# Patient Record
Sex: FEMALE | Race: WHITE | Employment: OTHER | URBAN - METROPOLITAN AREA
[De-identification: names, ages, dates, MRNs, and addresses within clinical notes are randomized per-mention and may not be internally consistent; named-entity substitution may affect disease eponyms.]

---

## 2017-01-01 ENCOUNTER — HOSPITAL ENCOUNTER (EMERGENCY)
Age: 82
Discharge: HOME | End: 2017-01-02
Attending: EMERGENCY MEDICINE
Payer: MEDICARE

## 2017-01-01 VITALS
DIASTOLIC BLOOD PRESSURE: 69 MMHG | OXYGEN SATURATION: 97 % | SYSTOLIC BLOOD PRESSURE: 172 MMHG | WEIGHT: 111 LBS | RESPIRATION RATE: 16 BRPM | BODY MASS INDEX: 22.42 KG/M2 | TEMPERATURE: 98.4 F | HEART RATE: 69 BPM

## 2017-01-01 DIAGNOSIS — R19.7 DIARRHEA, UNSPECIFIED TYPE: Primary | ICD-10-CM

## 2017-01-01 DIAGNOSIS — N30.00 ACUTE CYSTITIS WITHOUT HEMATURIA: ICD-10-CM

## 2017-01-01 LAB
ALBUMIN SERPL-MCNC: 4.3 G/DL (ref 3.9–4.9)
ALP BLD-CCNC: 58 U/L (ref 40–130)
ALT SERPL-CCNC: 9 U/L (ref 0–33)
ANION GAP SERPL CALCULATED.3IONS-SCNC: 16 MEQ/L (ref 7–13)
AST SERPL-CCNC: 16 U/L (ref 0–35)
BACTERIA: ABNORMAL /HPF
BASOPHILS ABSOLUTE: 0 K/UL (ref 0–0.2)
BASOPHILS RELATIVE PERCENT: 0.2 %
BILIRUB SERPL-MCNC: 0.3 MG/DL (ref 0–1.2)
BILIRUBIN URINE: NEGATIVE
BLOOD, URINE: ABNORMAL
BUN BLDV-MCNC: 17 MG/DL (ref 8–23)
CALCIUM SERPL-MCNC: 9.2 MG/DL (ref 8.6–10.2)
CHLORIDE BLD-SCNC: 105 MEQ/L (ref 98–107)
CLARITY: ABNORMAL
CO2: 22 MEQ/L (ref 22–29)
COLOR: YELLOW
CREAT SERPL-MCNC: 0.81 MG/DL (ref 0.5–0.9)
EOSINOPHILS ABSOLUTE: 0.1 K/UL (ref 0–0.7)
EOSINOPHILS RELATIVE PERCENT: 0.6 %
EPITHELIAL CELLS, UA: ABNORMAL /HPF
GFR AFRICAN AMERICAN: >60
GFR NON-AFRICAN AMERICAN: >60
GLOBULIN: 3.2 G/DL (ref 2.3–3.5)
GLUCOSE BLD-MCNC: 88 MG/DL (ref 74–109)
GLUCOSE URINE: 100 MG/DL
HCT VFR BLD CALC: 37.5 % (ref 37–47)
HEMOGLOBIN: 12.2 G/DL (ref 12–16)
KETONES, URINE: NEGATIVE MG/DL
LEUKOCYTE ESTERASE, URINE: ABNORMAL
LYMPHOCYTES ABSOLUTE: 2.5 K/UL (ref 1–4.8)
LYMPHOCYTES RELATIVE PERCENT: 21.9 %
MCH RBC QN AUTO: 29.3 PG (ref 27–31.3)
MCHC RBC AUTO-ENTMCNC: 32.6 % (ref 33–37)
MCV RBC AUTO: 90 FL (ref 82–100)
MONOCYTES ABSOLUTE: 1.1 K/UL (ref 0.2–0.8)
MONOCYTES RELATIVE PERCENT: 9.4 %
NEUTROPHILS ABSOLUTE: 7.8 K/UL (ref 1.4–6.5)
NEUTROPHILS RELATIVE PERCENT: 67.9 %
NITRITE, URINE: NEGATIVE
PDW BLD-RTO: 13.8 % (ref 11.5–14.5)
PH UA: 5.5 (ref 5–9)
PLATELET # BLD: 127 K/UL (ref 130–400)
POTASSIUM SERPL-SCNC: 4 MEQ/L (ref 3.5–5.1)
PROTEIN UA: 100 MG/DL
RBC # BLD: 4.16 M/UL (ref 4.2–5.4)
RBC UA: ABNORMAL /HPF (ref 0–2)
SODIUM BLD-SCNC: 143 MEQ/L (ref 132–144)
SPECIFIC GRAVITY UA: 1.02 (ref 1–1.03)
TOTAL PROTEIN: 7.5 G/DL (ref 6.4–8.1)
UROBILINOGEN, URINE: 0.2 E.U./DL
WBC # BLD: 11.5 K/UL (ref 4.8–10.8)
WBC UA: ABNORMAL /HPF (ref 0–5)

## 2017-01-01 PROCEDURE — 2580000003 HC RX 258: Performed by: EMERGENCY MEDICINE

## 2017-01-01 PROCEDURE — 87186 SC STD MICRODIL/AGAR DIL: CPT

## 2017-01-01 PROCEDURE — 80053 COMPREHEN METABOLIC PANEL: CPT

## 2017-01-01 PROCEDURE — 87493 C DIFF AMPLIFIED PROBE: CPT

## 2017-01-01 PROCEDURE — 87086 URINE CULTURE/COLONY COUNT: CPT

## 2017-01-01 PROCEDURE — 99284 EMERGENCY DEPT VISIT MOD MDM: CPT

## 2017-01-01 PROCEDURE — 36415 COLL VENOUS BLD VENIPUNCTURE: CPT

## 2017-01-01 PROCEDURE — 96361 HYDRATE IV INFUSION ADD-ON: CPT

## 2017-01-01 PROCEDURE — 87077 CULTURE AEROBIC IDENTIFY: CPT

## 2017-01-01 PROCEDURE — 87899 AGENT NOS ASSAY W/OPTIC: CPT

## 2017-01-01 PROCEDURE — 87427 SHIGA-LIKE TOXIN AG IA: CPT

## 2017-01-01 PROCEDURE — 81001 URINALYSIS AUTO W/SCOPE: CPT

## 2017-01-01 PROCEDURE — 85025 COMPLETE CBC W/AUTO DIFF WBC: CPT

## 2017-01-01 PROCEDURE — 96360 HYDRATION IV INFUSION INIT: CPT

## 2017-01-01 PROCEDURE — 87045 FECES CULTURE AEROBIC BACT: CPT

## 2017-01-01 PROCEDURE — 87015 SPECIMEN INFECT AGNT CONCNTJ: CPT

## 2017-01-01 RX ORDER — 0.9 % SODIUM CHLORIDE 0.9 %
500 INTRAVENOUS SOLUTION INTRAVENOUS ONCE
Status: COMPLETED | OUTPATIENT
Start: 2017-01-01 | End: 2017-01-02

## 2017-01-01 RX ORDER — SODIUM CHLORIDE 9 MG/ML
INJECTION, SOLUTION INTRAVENOUS CONTINUOUS
Status: DISCONTINUED | OUTPATIENT
Start: 2017-01-01 | End: 2017-01-02 | Stop reason: HOSPADM

## 2017-01-01 RX ADMIN — SODIUM CHLORIDE 500 ML: 9 INJECTION, SOLUTION INTRAVENOUS at 22:05

## 2017-01-01 ASSESSMENT — PAIN DESCRIPTION - DESCRIPTORS: DESCRIPTORS: CRAMPING

## 2017-01-01 ASSESSMENT — PAIN SCALES - GENERAL: PAINLEVEL_OUTOF10: 3

## 2017-01-01 ASSESSMENT — PAIN DESCRIPTION - LOCATION: LOCATION: ABDOMEN

## 2017-01-02 RX ORDER — CIPROFLOXACIN 500 MG/1
500 TABLET, FILM COATED ORAL 2 TIMES DAILY
Qty: 14 TABLET | Refills: 0 | Status: SHIPPED | OUTPATIENT
Start: 2017-01-02 | End: 2017-01-09

## 2017-01-02 RX ORDER — METRONIDAZOLE 500 MG/1
500 TABLET ORAL 3 TIMES DAILY
Qty: 20 TABLET | Refills: 0 | Status: SHIPPED | OUTPATIENT
Start: 2017-01-02 | End: 2017-01-12

## 2017-01-02 ASSESSMENT — ENCOUNTER SYMPTOMS
RECTAL PAIN: 0
WHEEZING: 0
ABDOMINAL DISTENTION: 0
VOMITING: 0
BLOOD IN STOOL: 0
SHORTNESS OF BREATH: 0
RHINORRHEA: 0
DIARRHEA: 1
EYE PAIN: 0
TROUBLE SWALLOWING: 0
COUGH: 0
NAUSEA: 1
CHEST TIGHTNESS: 0
BACK PAIN: 0
ABDOMINAL PAIN: 0

## 2017-01-03 LAB — CLOSTRIDIUM DIFFICILE DNA AMPLIFICATION: NORMAL

## 2017-01-04 LAB
ORGANISM: ABNORMAL
URINE CULTURE, ROUTINE: ABNORMAL

## 2017-01-05 LAB
CULTURE, STOOL: NORMAL
E COLI SHIGA TOXIN ASSAY: NORMAL

## 2017-01-18 RX ORDER — BLOOD-GLUCOSE METER
KIT MISCELLANEOUS
Qty: 100 EACH | Refills: 3 | Status: SHIPPED | OUTPATIENT
Start: 2017-01-18 | End: 2017-06-05 | Stop reason: SDUPTHER

## 2017-03-07 ENCOUNTER — HOSPITAL ENCOUNTER (OUTPATIENT)
Age: 82
Setting detail: SPECIMEN
Discharge: HOME | End: 2017-03-07
Payer: MEDICARE

## 2017-03-07 ENCOUNTER — NURSE ONLY (OUTPATIENT)
Dept: FAMILY MEDICINE CLINIC | Age: 82
End: 2017-03-07

## 2017-03-07 DIAGNOSIS — E03.8 OTHER SPECIFIED HYPOTHYROIDISM: ICD-10-CM

## 2017-03-07 DIAGNOSIS — E11.9 TYPE 2 DIABETES MELLITUS WITHOUT COMPLICATION, WITH LONG-TERM CURRENT USE OF INSULIN (HCC): Primary | ICD-10-CM

## 2017-03-07 DIAGNOSIS — Z79.4 TYPE 2 DIABETES MELLITUS WITHOUT COMPLICATION, WITH LONG-TERM CURRENT USE OF INSULIN (HCC): ICD-10-CM

## 2017-03-07 DIAGNOSIS — Z79.4 TYPE 2 DIABETES MELLITUS WITHOUT COMPLICATION, WITH LONG-TERM CURRENT USE OF INSULIN (HCC): Primary | ICD-10-CM

## 2017-03-07 DIAGNOSIS — E11.9 TYPE 2 DIABETES MELLITUS WITHOUT COMPLICATION, WITH LONG-TERM CURRENT USE OF INSULIN (HCC): ICD-10-CM

## 2017-03-07 LAB
ANION GAP SERPL CALCULATED.3IONS-SCNC: 11 MEQ/L (ref 7–13)
BUN BLDV-MCNC: 18 MG/DL (ref 8–23)
CALCIUM SERPL-MCNC: 9.2 MG/DL (ref 8.6–10.2)
CHLORIDE BLD-SCNC: 104 MEQ/L (ref 98–107)
CO2: 26 MEQ/L (ref 22–29)
CREAT SERPL-MCNC: 0.92 MG/DL (ref 0.5–0.9)
GFR AFRICAN AMERICAN: >60
GFR NON-AFRICAN AMERICAN: 56.8
GLUCOSE BLD-MCNC: 110 MG/DL (ref 74–109)
HBA1C MFR BLD: 7 % (ref 4.8–5.9)
POTASSIUM SERPL-SCNC: 4.4 MEQ/L (ref 3.5–5.1)
SODIUM BLD-SCNC: 141 MEQ/L (ref 132–144)
T4 FREE: 1.44 NG/DL (ref 0.93–1.7)
TSH SERPL DL<=0.05 MIU/L-ACNC: 2.01 UIU/ML (ref 0.27–4.2)

## 2017-03-07 PROCEDURE — 84443 ASSAY THYROID STIM HORMONE: CPT

## 2017-03-07 PROCEDURE — 80048 BASIC METABOLIC PNL TOTAL CA: CPT

## 2017-03-07 PROCEDURE — 36415 COLL VENOUS BLD VENIPUNCTURE: CPT | Performed by: FAMILY MEDICINE

## 2017-03-07 PROCEDURE — 84439 ASSAY OF FREE THYROXINE: CPT

## 2017-03-07 PROCEDURE — 83036 HEMOGLOBIN GLYCOSYLATED A1C: CPT

## 2017-03-20 RX ORDER — LANCETS 28 GAUGE
1 EACH MISCELLANEOUS 3 TIMES DAILY
Qty: 100 EACH | Refills: 6 | Status: SHIPPED | OUTPATIENT
Start: 2017-03-20 | End: 2017-11-14 | Stop reason: SDUPTHER

## 2017-03-21 ENCOUNTER — OFFICE VISIT (OUTPATIENT)
Dept: SURGERY | Age: 82
End: 2017-03-21

## 2017-03-21 VITALS
HEART RATE: 67 BPM | WEIGHT: 110 LBS | SYSTOLIC BLOOD PRESSURE: 183 MMHG | HEIGHT: 55 IN | BODY MASS INDEX: 25.46 KG/M2 | DIASTOLIC BLOOD PRESSURE: 68 MMHG

## 2017-03-21 DIAGNOSIS — E03.9 HYPOTHYROIDISM, UNSPECIFIED TYPE: ICD-10-CM

## 2017-03-21 DIAGNOSIS — Z79.4 TYPE 2 DIABETES MELLITUS WITHOUT COMPLICATION, WITH LONG-TERM CURRENT USE OF INSULIN (HCC): Primary | ICD-10-CM

## 2017-03-21 DIAGNOSIS — E11.9 TYPE 2 DIABETES MELLITUS WITHOUT COMPLICATION, WITH LONG-TERM CURRENT USE OF INSULIN (HCC): Primary | ICD-10-CM

## 2017-03-21 LAB — GLUCOSE BLD-MCNC: 136 MG/DL

## 2017-03-21 PROCEDURE — G8420 CALC BMI NORM PARAMETERS: HCPCS | Performed by: INTERNAL MEDICINE

## 2017-03-21 PROCEDURE — G8427 DOCREV CUR MEDS BY ELIG CLIN: HCPCS | Performed by: INTERNAL MEDICINE

## 2017-03-21 PROCEDURE — 1090F PRES/ABSN URINE INCON ASSESS: CPT | Performed by: INTERNAL MEDICINE

## 2017-03-21 PROCEDURE — 1123F ACP DISCUSS/DSCN MKR DOCD: CPT | Performed by: INTERNAL MEDICINE

## 2017-03-21 PROCEDURE — G8484 FLU IMMUNIZE NO ADMIN: HCPCS | Performed by: INTERNAL MEDICINE

## 2017-03-21 PROCEDURE — 82962 GLUCOSE BLOOD TEST: CPT | Performed by: INTERNAL MEDICINE

## 2017-03-21 PROCEDURE — G8598 ASA/ANTIPLAT THER USED: HCPCS | Performed by: INTERNAL MEDICINE

## 2017-03-21 PROCEDURE — 4040F PNEUMOC VAC/ADMIN/RCVD: CPT | Performed by: INTERNAL MEDICINE

## 2017-03-21 PROCEDURE — 1036F TOBACCO NON-USER: CPT | Performed by: INTERNAL MEDICINE

## 2017-03-21 PROCEDURE — 99213 OFFICE O/P EST LOW 20 MIN: CPT | Performed by: INTERNAL MEDICINE

## 2017-04-17 RX ORDER — INSULIN LISPRO 100 [IU]/ML
INJECTION, SOLUTION INTRAVENOUS; SUBCUTANEOUS
Qty: 15 ML | Refills: 3 | Status: SHIPPED | OUTPATIENT
Start: 2017-04-17 | End: 2018-09-04 | Stop reason: SDUPTHER

## 2017-06-06 RX ORDER — BLOOD-GLUCOSE METER
KIT MISCELLANEOUS
Qty: 100 STRIP | Refills: 3 | Status: SHIPPED | OUTPATIENT
Start: 2017-06-06 | End: 2017-10-13 | Stop reason: SDUPTHER

## 2017-06-15 ENCOUNTER — HOSPITAL ENCOUNTER (OUTPATIENT)
Age: 82
Setting detail: SPECIMEN
Discharge: HOME | End: 2017-06-15
Payer: MEDICARE

## 2017-06-15 ENCOUNTER — NURSE ONLY (OUTPATIENT)
Dept: FAMILY MEDICINE CLINIC | Age: 82
End: 2017-06-15

## 2017-06-15 DIAGNOSIS — E03.9 HYPOTHYROIDISM, UNSPECIFIED TYPE: ICD-10-CM

## 2017-06-15 DIAGNOSIS — Z79.4 TYPE 2 DIABETES MELLITUS WITHOUT COMPLICATION, WITH LONG-TERM CURRENT USE OF INSULIN (HCC): ICD-10-CM

## 2017-06-15 DIAGNOSIS — E11.9 TYPE 2 DIABETES MELLITUS WITHOUT COMPLICATION, WITH LONG-TERM CURRENT USE OF INSULIN (HCC): ICD-10-CM

## 2017-06-15 DIAGNOSIS — E03.9 HYPOTHYROIDISM, UNSPECIFIED TYPE: Primary | ICD-10-CM

## 2017-06-15 LAB
ANION GAP SERPL CALCULATED.3IONS-SCNC: 14 MEQ/L (ref 7–13)
BUN BLDV-MCNC: 22 MG/DL (ref 8–23)
CALCIUM SERPL-MCNC: 8.6 MG/DL (ref 8.6–10.2)
CHLORIDE BLD-SCNC: 105 MEQ/L (ref 98–107)
CO2: 23 MEQ/L (ref 22–29)
CREAT SERPL-MCNC: 0.77 MG/DL (ref 0.5–0.9)
GFR AFRICAN AMERICAN: >60
GFR NON-AFRICAN AMERICAN: >60
GLUCOSE BLD-MCNC: 133 MG/DL (ref 74–109)
HBA1C MFR BLD: 7.6 % (ref 4.8–5.9)
POTASSIUM SERPL-SCNC: 4.3 MEQ/L (ref 3.5–5.1)
SODIUM BLD-SCNC: 142 MEQ/L (ref 132–144)
T4 FREE: 1.39 NG/DL (ref 0.93–1.7)
TSH SERPL DL<=0.05 MIU/L-ACNC: 1.98 UIU/ML (ref 0.27–4.2)

## 2017-06-15 PROCEDURE — 84439 ASSAY OF FREE THYROXINE: CPT

## 2017-06-15 PROCEDURE — 84443 ASSAY THYROID STIM HORMONE: CPT

## 2017-06-15 PROCEDURE — 83036 HEMOGLOBIN GLYCOSYLATED A1C: CPT

## 2017-06-15 PROCEDURE — 80048 BASIC METABOLIC PNL TOTAL CA: CPT

## 2017-06-15 PROCEDURE — 36415 COLL VENOUS BLD VENIPUNCTURE: CPT | Performed by: FAMILY MEDICINE

## 2017-06-21 ENCOUNTER — OFFICE VISIT (OUTPATIENT)
Dept: SURGERY | Age: 82
End: 2017-06-21

## 2017-06-21 VITALS
HEART RATE: 58 BPM | WEIGHT: 109 LBS | DIASTOLIC BLOOD PRESSURE: 62 MMHG | HEIGHT: 55 IN | BODY MASS INDEX: 25.22 KG/M2 | SYSTOLIC BLOOD PRESSURE: 192 MMHG

## 2017-06-21 DIAGNOSIS — Z79.4 TYPE 2 DIABETES MELLITUS WITHOUT COMPLICATION, WITH LONG-TERM CURRENT USE OF INSULIN (HCC): Primary | ICD-10-CM

## 2017-06-21 DIAGNOSIS — E03.9 HYPOTHYROIDISM, UNSPECIFIED TYPE: ICD-10-CM

## 2017-06-21 DIAGNOSIS — E11.9 TYPE 2 DIABETES MELLITUS WITHOUT COMPLICATION, WITH LONG-TERM CURRENT USE OF INSULIN (HCC): Primary | ICD-10-CM

## 2017-06-21 LAB — GLUCOSE BLD-MCNC: 118 MG/DL

## 2017-06-21 PROCEDURE — G8419 CALC BMI OUT NRM PARAM NOF/U: HCPCS | Performed by: INTERNAL MEDICINE

## 2017-06-21 PROCEDURE — 1090F PRES/ABSN URINE INCON ASSESS: CPT | Performed by: INTERNAL MEDICINE

## 2017-06-21 PROCEDURE — G8427 DOCREV CUR MEDS BY ELIG CLIN: HCPCS | Performed by: INTERNAL MEDICINE

## 2017-06-21 PROCEDURE — 82962 GLUCOSE BLOOD TEST: CPT | Performed by: INTERNAL MEDICINE

## 2017-06-21 PROCEDURE — 99213 OFFICE O/P EST LOW 20 MIN: CPT | Performed by: INTERNAL MEDICINE

## 2017-06-21 PROCEDURE — G8598 ASA/ANTIPLAT THER USED: HCPCS | Performed by: INTERNAL MEDICINE

## 2017-06-21 PROCEDURE — 1123F ACP DISCUSS/DSCN MKR DOCD: CPT | Performed by: INTERNAL MEDICINE

## 2017-06-21 PROCEDURE — 4040F PNEUMOC VAC/ADMIN/RCVD: CPT | Performed by: INTERNAL MEDICINE

## 2017-06-21 PROCEDURE — 1036F TOBACCO NON-USER: CPT | Performed by: INTERNAL MEDICINE

## 2017-09-02 ENCOUNTER — APPOINTMENT (OUTPATIENT)
Dept: CT IMAGING | Age: 82
End: 2017-09-02
Payer: MEDICARE

## 2017-09-02 ENCOUNTER — HOSPITAL ENCOUNTER (EMERGENCY)
Age: 82
Discharge: HOME | End: 2017-09-02
Attending: EMERGENCY MEDICINE
Payer: MEDICARE

## 2017-09-02 VITALS
HEIGHT: 55 IN | RESPIRATION RATE: 18 BRPM | TEMPERATURE: 98 F | WEIGHT: 112 LBS | OXYGEN SATURATION: 98 % | BODY MASS INDEX: 25.92 KG/M2 | HEART RATE: 85 BPM

## 2017-09-02 DIAGNOSIS — R10.30 LOWER ABDOMINAL PAIN: Primary | ICD-10-CM

## 2017-09-02 DIAGNOSIS — R19.7 DIARRHEA, UNSPECIFIED TYPE: ICD-10-CM

## 2017-09-02 LAB
ALBUMIN SERPL-MCNC: 4.1 G/DL (ref 3.9–4.9)
ALP BLD-CCNC: 50 U/L (ref 40–130)
ALT SERPL-CCNC: 12 U/L (ref 0–33)
ANION GAP SERPL CALCULATED.3IONS-SCNC: 15 MEQ/L (ref 7–13)
AST SERPL-CCNC: 16 U/L (ref 0–35)
BACTERIA: NORMAL /HPF
BASOPHILS ABSOLUTE: 0 K/UL (ref 0–0.2)
BASOPHILS RELATIVE PERCENT: 0.3 %
BILIRUB SERPL-MCNC: 0.4 MG/DL (ref 0–1.2)
BILIRUBIN URINE: NEGATIVE
BLOOD, URINE: ABNORMAL
BUN BLDV-MCNC: 18 MG/DL (ref 8–23)
CALCIUM SERPL-MCNC: 9.1 MG/DL (ref 8.6–10.2)
CHLORIDE BLD-SCNC: 102 MEQ/L (ref 98–107)
CLARITY: CLEAR
CO2: 24 MEQ/L (ref 22–29)
COLOR: YELLOW
CREAT SERPL-MCNC: 0.95 MG/DL (ref 0.5–0.9)
EOSINOPHILS ABSOLUTE: 0.1 K/UL (ref 0–0.7)
EOSINOPHILS RELATIVE PERCENT: 1.7 %
GFR AFRICAN AMERICAN: >60
GFR NON-AFRICAN AMERICAN: 54.6
GLOBULIN: 2.7 G/DL (ref 2.3–3.5)
GLUCOSE BLD-MCNC: 245 MG/DL (ref 74–109)
GLUCOSE URINE: 250 MG/DL
HCT VFR BLD CALC: 35.3 % (ref 37–47)
HEMOGLOBIN: 11.8 G/DL (ref 12–16)
KETONES, URINE: NEGATIVE MG/DL
LEUKOCYTE ESTERASE, URINE: NEGATIVE
LIPASE: 15 U/L (ref 13–60)
LYMPHOCYTES ABSOLUTE: 2.6 K/UL (ref 1–4.8)
LYMPHOCYTES RELATIVE PERCENT: 29.7 %
MCH RBC QN AUTO: 29.6 PG (ref 27–31.3)
MCHC RBC AUTO-ENTMCNC: 33.3 % (ref 33–37)
MCV RBC AUTO: 89 FL (ref 82–100)
MONOCYTES ABSOLUTE: 0.7 K/UL (ref 0.2–0.8)
MONOCYTES RELATIVE PERCENT: 8 %
MUCUS: PRESENT
NEUTROPHILS ABSOLUTE: 5.4 K/UL (ref 1.4–6.5)
NEUTROPHILS RELATIVE PERCENT: 60.3 %
NITRITE, URINE: NEGATIVE
PDW BLD-RTO: 14.1 % (ref 11.5–14.5)
PH UA: 5.5 (ref 5–9)
PLATELET # BLD: 111 K/UL (ref 130–400)
POTASSIUM SERPL-SCNC: 3.9 MEQ/L (ref 3.5–5.1)
PROTEIN UA: NEGATIVE MG/DL
RBC # BLD: 3.97 M/UL (ref 4.2–5.4)
RBC UA: NORMAL /HPF (ref 0–2)
SODIUM BLD-SCNC: 141 MEQ/L (ref 132–144)
SPECIFIC GRAVITY UA: <=1.005 (ref 1–1.03)
TOTAL PROTEIN: 6.8 G/DL (ref 6.4–8.1)
URINE REFLEX TO CULTURE: YES
UROBILINOGEN, URINE: 0.2 E.U./DL
WBC # BLD: 8.9 K/UL (ref 4.8–10.8)
WBC UA: NORMAL /HPF (ref 0–5)

## 2017-09-02 PROCEDURE — 87086 URINE CULTURE/COLONY COUNT: CPT

## 2017-09-02 PROCEDURE — 99284 EMERGENCY DEPT VISIT MOD MDM: CPT

## 2017-09-02 PROCEDURE — 36415 COLL VENOUS BLD VENIPUNCTURE: CPT

## 2017-09-02 PROCEDURE — 85025 COMPLETE CBC W/AUTO DIFF WBC: CPT

## 2017-09-02 PROCEDURE — 80053 COMPREHEN METABOLIC PANEL: CPT

## 2017-09-02 PROCEDURE — 2580000003 HC RX 258: Performed by: EMERGENCY MEDICINE

## 2017-09-02 PROCEDURE — 74176 CT ABD & PELVIS W/O CONTRAST: CPT

## 2017-09-02 PROCEDURE — 83690 ASSAY OF LIPASE: CPT

## 2017-09-02 PROCEDURE — 81001 URINALYSIS AUTO W/SCOPE: CPT

## 2017-09-02 RX ORDER — DICYCLOMINE HYDROCHLORIDE 10 MG/1
10 CAPSULE ORAL EVERY 6 HOURS PRN
Qty: 20 CAPSULE | Refills: 0 | Status: SHIPPED | OUTPATIENT
Start: 2017-09-02 | End: 2020-06-04

## 2017-09-02 RX ORDER — SODIUM CHLORIDE 9 MG/ML
INJECTION, SOLUTION INTRAVENOUS CONTINUOUS
Status: DISCONTINUED | OUTPATIENT
Start: 2017-09-02 | End: 2017-09-02 | Stop reason: HOSPADM

## 2017-09-02 RX ADMIN — SODIUM CHLORIDE: 9 INJECTION, SOLUTION INTRAVENOUS at 12:23

## 2017-09-02 ASSESSMENT — ENCOUNTER SYMPTOMS
VOMITING: 0
DIARRHEA: 1
BACK PAIN: 0
NAUSEA: 0
CHEST TIGHTNESS: 0
WHEEZING: 0
EYE PAIN: 0
RHINORRHEA: 0
COUGH: 0
BLOOD IN STOOL: 0
TROUBLE SWALLOWING: 0
ABDOMINAL PAIN: 1
SHORTNESS OF BREATH: 0

## 2017-09-02 ASSESSMENT — PAIN SCALES - GENERAL
PAINLEVEL_OUTOF10: 2
PAINLEVEL_OUTOF10: 0

## 2017-09-02 ASSESSMENT — PAIN DESCRIPTION - DESCRIPTORS: DESCRIPTORS: CRAMPING

## 2017-09-02 ASSESSMENT — PAIN DESCRIPTION - PROGRESSION: CLINICAL_PROGRESSION: NOT CHANGED

## 2017-09-02 ASSESSMENT — PAIN DESCRIPTION - LOCATION
LOCATION: ABDOMEN
LOCATION: ABDOMEN

## 2017-09-02 ASSESSMENT — PAIN DESCRIPTION - PAIN TYPE: TYPE: ACUTE PAIN

## 2017-09-02 ASSESSMENT — PAIN DESCRIPTION - ONSET: ONSET: ON-GOING

## 2017-09-02 ASSESSMENT — PAIN DESCRIPTION - FREQUENCY: FREQUENCY: CONTINUOUS

## 2017-09-02 ASSESSMENT — PAIN DESCRIPTION - ORIENTATION: ORIENTATION: LOWER

## 2017-09-04 LAB — URINE CULTURE, ROUTINE: NORMAL

## 2017-09-12 ENCOUNTER — HOSPITAL ENCOUNTER (OUTPATIENT)
Age: 82
Setting detail: SPECIMEN
Discharge: HOME | End: 2017-09-12
Payer: MEDICARE

## 2017-09-12 DIAGNOSIS — Z79.4 TYPE 2 DIABETES MELLITUS WITHOUT COMPLICATION, WITH LONG-TERM CURRENT USE OF INSULIN (HCC): ICD-10-CM

## 2017-09-12 DIAGNOSIS — E11.9 TYPE 2 DIABETES MELLITUS WITHOUT COMPLICATION, WITH LONG-TERM CURRENT USE OF INSULIN (HCC): ICD-10-CM

## 2017-09-12 DIAGNOSIS — E03.9 HYPOTHYROIDISM, UNSPECIFIED TYPE: ICD-10-CM

## 2017-09-12 LAB
ANION GAP SERPL CALCULATED.3IONS-SCNC: 15 MEQ/L (ref 7–13)
BUN BLDV-MCNC: 23 MG/DL (ref 8–23)
CALCIUM SERPL-MCNC: 9.2 MG/DL (ref 8.6–10.2)
CHLORIDE BLD-SCNC: 103 MEQ/L (ref 98–107)
CO2: 25 MEQ/L (ref 22–29)
CREAT SERPL-MCNC: 0.94 MG/DL (ref 0.5–0.9)
GFR AFRICAN AMERICAN: >60
GFR NON-AFRICAN AMERICAN: 55.3
GLUCOSE BLD-MCNC: 158 MG/DL (ref 74–109)
HBA1C MFR BLD: 7.8 % (ref 4.8–5.9)
POTASSIUM SERPL-SCNC: 4.1 MEQ/L (ref 3.5–5.1)
SODIUM BLD-SCNC: 143 MEQ/L (ref 132–144)
T4 FREE: 1.33 NG/DL (ref 0.93–1.7)
TSH SERPL DL<=0.05 MIU/L-ACNC: 3.48 UIU/ML (ref 0.27–4.2)

## 2017-09-12 PROCEDURE — 84443 ASSAY THYROID STIM HORMONE: CPT

## 2017-09-12 PROCEDURE — 84439 ASSAY OF FREE THYROXINE: CPT

## 2017-09-12 PROCEDURE — 80048 BASIC METABOLIC PNL TOTAL CA: CPT

## 2017-09-12 PROCEDURE — 83036 HEMOGLOBIN GLYCOSYLATED A1C: CPT

## 2017-09-15 RX ORDER — PEN NEEDLE, DIABETIC 32GX 5/32"
NEEDLE, DISPOSABLE MISCELLANEOUS
Qty: 100 EACH | Refills: 6 | Status: SHIPPED | OUTPATIENT
Start: 2017-09-15 | End: 2017-11-24 | Stop reason: ALTCHOICE

## 2017-09-19 ENCOUNTER — OFFICE VISIT (OUTPATIENT)
Dept: FAMILY MEDICINE CLINIC | Age: 82
End: 2017-09-19

## 2017-09-19 VITALS
RESPIRATION RATE: 14 BRPM | OXYGEN SATURATION: 97 % | DIASTOLIC BLOOD PRESSURE: 62 MMHG | BODY MASS INDEX: 25.57 KG/M2 | SYSTOLIC BLOOD PRESSURE: 150 MMHG | HEART RATE: 76 BPM | WEIGHT: 110 LBS

## 2017-09-19 DIAGNOSIS — H61.891 IRRITATION OF EXTERNAL EAR CANAL, RIGHT: Primary | ICD-10-CM

## 2017-09-19 PROBLEM — I10 ESSENTIAL HYPERTENSION: Status: ACTIVE | Noted: 2017-09-19

## 2017-09-19 PROCEDURE — 1036F TOBACCO NON-USER: CPT | Performed by: PHYSICIAN ASSISTANT

## 2017-09-19 PROCEDURE — 3288F FALL RISK ASSESSMENT DOCD: CPT | Performed by: PHYSICIAN ASSISTANT

## 2017-09-19 PROCEDURE — G8427 DOCREV CUR MEDS BY ELIG CLIN: HCPCS | Performed by: PHYSICIAN ASSISTANT

## 2017-09-19 PROCEDURE — G8417 CALC BMI ABV UP PARAM F/U: HCPCS | Performed by: PHYSICIAN ASSISTANT

## 2017-09-19 PROCEDURE — G8598 ASA/ANTIPLAT THER USED: HCPCS | Performed by: PHYSICIAN ASSISTANT

## 2017-09-19 PROCEDURE — 1090F PRES/ABSN URINE INCON ASSESS: CPT | Performed by: PHYSICIAN ASSISTANT

## 2017-09-19 PROCEDURE — 4040F PNEUMOC VAC/ADMIN/RCVD: CPT | Performed by: PHYSICIAN ASSISTANT

## 2017-09-19 PROCEDURE — 99212 OFFICE O/P EST SF 10 MIN: CPT | Performed by: PHYSICIAN ASSISTANT

## 2017-09-19 PROCEDURE — 1123F ACP DISCUSS/DSCN MKR DOCD: CPT | Performed by: PHYSICIAN ASSISTANT

## 2017-09-19 ASSESSMENT — ENCOUNTER SYMPTOMS
RHINORRHEA: 0
COUGH: 0
SORE THROAT: 0

## 2017-09-20 ENCOUNTER — OFFICE VISIT (OUTPATIENT)
Dept: SURGERY | Age: 82
End: 2017-09-20

## 2017-09-20 VITALS
SYSTOLIC BLOOD PRESSURE: 160 MMHG | RESPIRATION RATE: 16 BRPM | HEIGHT: 56 IN | OXYGEN SATURATION: 96 % | DIASTOLIC BLOOD PRESSURE: 64 MMHG | BODY MASS INDEX: 24.56 KG/M2 | WEIGHT: 109.2 LBS | HEART RATE: 67 BPM | TEMPERATURE: 98.4 F

## 2017-09-20 DIAGNOSIS — Z23 NEED FOR INFLUENZA VACCINATION: ICD-10-CM

## 2017-09-20 DIAGNOSIS — Z79.4 TYPE 2 DIABETES MELLITUS WITH DIABETIC POLYNEUROPATHY, WITH LONG-TERM CURRENT USE OF INSULIN (HCC): Primary | ICD-10-CM

## 2017-09-20 DIAGNOSIS — E11.42 TYPE 2 DIABETES MELLITUS WITH DIABETIC POLYNEUROPATHY, WITH LONG-TERM CURRENT USE OF INSULIN (HCC): Primary | ICD-10-CM

## 2017-09-20 LAB — GLUCOSE BLD-MCNC: 125 MG/DL

## 2017-09-20 PROCEDURE — 1090F PRES/ABSN URINE INCON ASSESS: CPT | Performed by: INTERNAL MEDICINE

## 2017-09-20 PROCEDURE — G8427 DOCREV CUR MEDS BY ELIG CLIN: HCPCS | Performed by: INTERNAL MEDICINE

## 2017-09-20 PROCEDURE — G0008 ADMIN INFLUENZA VIRUS VAC: HCPCS | Performed by: INTERNAL MEDICINE

## 2017-09-20 PROCEDURE — 82962 GLUCOSE BLOOD TEST: CPT | Performed by: INTERNAL MEDICINE

## 2017-09-20 PROCEDURE — 1123F ACP DISCUSS/DSCN MKR DOCD: CPT | Performed by: INTERNAL MEDICINE

## 2017-09-20 PROCEDURE — 1036F TOBACCO NON-USER: CPT | Performed by: INTERNAL MEDICINE

## 2017-09-20 PROCEDURE — G8598 ASA/ANTIPLAT THER USED: HCPCS | Performed by: INTERNAL MEDICINE

## 2017-09-20 PROCEDURE — 99213 OFFICE O/P EST LOW 20 MIN: CPT | Performed by: INTERNAL MEDICINE

## 2017-09-20 PROCEDURE — G8420 CALC BMI NORM PARAMETERS: HCPCS | Performed by: INTERNAL MEDICINE

## 2017-09-20 PROCEDURE — 90662 IIV NO PRSV INCREASED AG IM: CPT | Performed by: INTERNAL MEDICINE

## 2017-09-20 PROCEDURE — 4040F PNEUMOC VAC/ADMIN/RCVD: CPT | Performed by: INTERNAL MEDICINE

## 2017-10-13 RX ORDER — BLOOD-GLUCOSE METER
KIT MISCELLANEOUS
Qty: 100 STRIP | Refills: 3 | Status: SHIPPED | OUTPATIENT
Start: 2017-10-13 | End: 2018-02-09 | Stop reason: SDUPTHER

## 2017-10-14 RX ORDER — SIMVASTATIN 40 MG
TABLET ORAL
Qty: 30 TABLET | Refills: 2 | Status: SHIPPED | OUTPATIENT
Start: 2017-10-14 | End: 2018-02-27 | Stop reason: SDUPTHER

## 2017-10-14 RX ORDER — ISOSORBIDE MONONITRATE 30 MG/1
TABLET, EXTENDED RELEASE ORAL
Qty: 30 TABLET | Refills: 2 | Status: SHIPPED | OUTPATIENT
Start: 2017-10-14 | End: 2018-02-07 | Stop reason: SDUPTHER

## 2017-11-20 RX ORDER — LANCETS 28 GAUGE
EACH MISCELLANEOUS
Qty: 100 EACH | Refills: 3 | Status: SHIPPED | OUTPATIENT
Start: 2017-11-20 | End: 2018-03-15 | Stop reason: SDUPTHER

## 2017-12-11 ENCOUNTER — CARE COORDINATION (OUTPATIENT)
Dept: CARE COORDINATION | Age: 82
End: 2017-12-11

## 2017-12-11 NOTE — CARE COORDINATION
This patient was temporarily screened out of Care Coordination on 12/11/2017 for the following reason:  declined

## 2017-12-14 ENCOUNTER — HOSPITAL ENCOUNTER (OUTPATIENT)
Age: 82
Setting detail: SPECIMEN
Discharge: HOME | End: 2017-12-14
Payer: MEDICARE

## 2017-12-14 ENCOUNTER — NURSE ONLY (OUTPATIENT)
Dept: FAMILY MEDICINE CLINIC | Age: 82
End: 2017-12-14

## 2017-12-14 DIAGNOSIS — E11.42 TYPE 2 DIABETES MELLITUS WITH DIABETIC POLYNEUROPATHY, WITH LONG-TERM CURRENT USE OF INSULIN (HCC): Primary | ICD-10-CM

## 2017-12-14 DIAGNOSIS — Z79.4 TYPE 2 DIABETES MELLITUS WITH DIABETIC POLYNEUROPATHY, WITH LONG-TERM CURRENT USE OF INSULIN (HCC): ICD-10-CM

## 2017-12-14 DIAGNOSIS — E11.42 TYPE 2 DIABETES MELLITUS WITH DIABETIC POLYNEUROPATHY, WITH LONG-TERM CURRENT USE OF INSULIN (HCC): ICD-10-CM

## 2017-12-14 DIAGNOSIS — Z79.4 TYPE 2 DIABETES MELLITUS WITH DIABETIC POLYNEUROPATHY, WITH LONG-TERM CURRENT USE OF INSULIN (HCC): Primary | ICD-10-CM

## 2017-12-14 PROCEDURE — 80048 BASIC METABOLIC PNL TOTAL CA: CPT

## 2017-12-14 PROCEDURE — 83036 HEMOGLOBIN GLYCOSYLATED A1C: CPT

## 2017-12-14 PROCEDURE — 36415 COLL VENOUS BLD VENIPUNCTURE: CPT | Performed by: FAMILY MEDICINE

## 2017-12-15 LAB
ANION GAP SERPL CALCULATED.3IONS-SCNC: 15 MEQ/L (ref 7–13)
BUN BLDV-MCNC: 23 MG/DL (ref 8–23)
CALCIUM SERPL-MCNC: 9.1 MG/DL (ref 8.6–10.2)
CHLORIDE BLD-SCNC: 103 MEQ/L (ref 98–107)
CO2: 26 MEQ/L (ref 22–29)
CREAT SERPL-MCNC: 0.87 MG/DL (ref 0.5–0.9)
GFR AFRICAN AMERICAN: >60
GFR NON-AFRICAN AMERICAN: >60
GLUCOSE BLD-MCNC: 115 MG/DL (ref 74–109)
HBA1C MFR BLD: 7.2 % (ref 4.8–5.9)
POTASSIUM SERPL-SCNC: 4.4 MEQ/L (ref 3.5–5.1)
SODIUM BLD-SCNC: 144 MEQ/L (ref 132–144)

## 2017-12-20 ENCOUNTER — OFFICE VISIT (OUTPATIENT)
Dept: SURGERY | Age: 82
End: 2017-12-20

## 2017-12-20 VITALS
HEIGHT: 55 IN | BODY MASS INDEX: 24.53 KG/M2 | WEIGHT: 106 LBS | DIASTOLIC BLOOD PRESSURE: 62 MMHG | SYSTOLIC BLOOD PRESSURE: 178 MMHG | HEART RATE: 57 BPM

## 2017-12-20 DIAGNOSIS — Z79.4 TYPE 2 DIABETES MELLITUS WITH DIABETIC POLYNEUROPATHY, WITH LONG-TERM CURRENT USE OF INSULIN (HCC): Primary | ICD-10-CM

## 2017-12-20 DIAGNOSIS — E11.42 TYPE 2 DIABETES MELLITUS WITH DIABETIC POLYNEUROPATHY, WITH LONG-TERM CURRENT USE OF INSULIN (HCC): Primary | ICD-10-CM

## 2017-12-20 LAB — GLUCOSE BLD-MCNC: 200 MG/DL

## 2017-12-20 PROCEDURE — 4040F PNEUMOC VAC/ADMIN/RCVD: CPT | Performed by: INTERNAL MEDICINE

## 2017-12-20 PROCEDURE — 99213 OFFICE O/P EST LOW 20 MIN: CPT | Performed by: INTERNAL MEDICINE

## 2017-12-20 PROCEDURE — 82962 GLUCOSE BLOOD TEST: CPT | Performed by: INTERNAL MEDICINE

## 2017-12-20 PROCEDURE — 1123F ACP DISCUSS/DSCN MKR DOCD: CPT | Performed by: INTERNAL MEDICINE

## 2017-12-20 PROCEDURE — G8420 CALC BMI NORM PARAMETERS: HCPCS | Performed by: INTERNAL MEDICINE

## 2017-12-20 PROCEDURE — 1090F PRES/ABSN URINE INCON ASSESS: CPT | Performed by: INTERNAL MEDICINE

## 2017-12-20 PROCEDURE — G8427 DOCREV CUR MEDS BY ELIG CLIN: HCPCS | Performed by: INTERNAL MEDICINE

## 2017-12-20 PROCEDURE — G8484 FLU IMMUNIZE NO ADMIN: HCPCS | Performed by: INTERNAL MEDICINE

## 2017-12-20 PROCEDURE — G8598 ASA/ANTIPLAT THER USED: HCPCS | Performed by: INTERNAL MEDICINE

## 2017-12-20 PROCEDURE — 1036F TOBACCO NON-USER: CPT | Performed by: INTERNAL MEDICINE

## 2017-12-20 NOTE — PROGRESS NOTES
replacement    Mixed incontinence    Prolapse of vaginal wall    Urinary incontinence    Unsteady gait    Prolapsed urethral mucosa    Vitreous degeneration    Systolic hypertension     Allergies   Allergen Reactions    Codeine Hives     Listed as getting hives from codeine but has repeatedly tolerated Vicodan and percocet without rash or allergy 2/16/12 HES    Glucophage [Metformin Hydrochloride] Diarrhea    Lisinopril Other (See Comments)     Makes pt cough    Lyrica [Pregabalin] Other (See Comments)     Sleep deprivation     Other      Naprolen    Percocet [Oxycodone-Acetaminophen] Swelling     ankles    Procardia [Nifedipine]     Ranitidine Hcl     Tagamet [Cimetidine] Diarrhea    Welchol [Colesevelam Hcl] Diarrhea    Sulfamethopyrazine Nausea And Vomiting           Current Outpatient Prescriptions:     NOVOFINE 32G X 6 MM MISC, use 3 TO 4 times daily AS DIRECTED, Disp: 100 each, Rfl: 3    [facility] UNILET LANCETS 28G MISC, use to test three times daily, Disp: 100 each, Rfl: 3    simvastatin (ZOCOR) 40 MG tablet, TAKE 1 TABLET BY MOUTH nightly, Disp: 30 tablet, Rfl: 2    isosorbide mononitrate (IMDUR) 30 MG extended release tablet, TAKE 1 TABLET BY MOUTH DAILY, Disp: 30 tablet, Rfl: 2    FREESTYLE LITE strip, test 3 times daily, Disp: 100 strip, Rfl: 3    dicyclomine (BENTYL) 10 MG capsule, Take 1 capsule by mouth every 6 hours as needed (cramps), Disp: 20 capsule, Rfl: 0    levothyroxine (SYNTHROID) 75 MCG tablet, TAKE 1 TABLET EVERY DAY, Disp: 30 tablet, Rfl: 11    HUMALOG KWIKPEN 100 UNIT/ML pen, inject 4 units in the am & lunch if glucose more than 160, Disp: 15 mL, Rfl: 3    insulin glargine (LANTUS SOLOSTAR) 100 UNIT/ML injection pen, Inject 6 units into the skin nightly, Disp: 5 Pen, Rfl: 3    DOCQLACE 100 MG capsule, Take 1 capsule by mouth 2 times daily, Disp: 60 capsule, Rfl: 3    Handicap Placard MISC, by Does not apply route Dx Gait Abnormality  Expires: 2019, Disp: 1 Potassium Latest Ref Range: 3.5 - 5.1 mEq/L 4.4   Chloride Latest Ref Range: 98 - 107 mEq/L 103   CO2 Latest Ref Range: 22 - 29 mEq/L 26   BUN Latest Ref Range: 8 - 23 mg/dL 23   Creatinine Latest Ref Range: 0.50 - 0.90 mg/dL 0.87   Anion Gap Latest Ref Range: 7 - 13 mEq/L 15 (H)   GFR Non- Latest Ref Range: >60  >60.0   GFR African American Latest Ref Range: >60  >60.0   Glucose Latest Ref Range: 74 - 109 mg/dL 115 (H)   Calcium Latest Ref Range: 8.6 - 10.2 mg/dL 9.1   Hemoglobin A1C Latest Ref Range: 4.8 - 5.9 % 7.2 (H)         Assessment:      1. Type 2 diabetes mellitus with diabetic polyneuropathy, with long-term current use of insulin ([facility] [address]  POCT Glucose           Plan:      Orders Placed This Encounter   Procedures    Basic Metabolic Panel     Standing Status:   Future     Standing Expiration Date:   12/20/2018    Hemoglobin A1C     Standing Status:   Future     Standing Expiration Date:   12/20/2018    POCT Glucose     The current medical regimen is effective;  continue present plan and medications.   Goal for her hbaic 7-8.5

## 2017-12-27 ENCOUNTER — OFFICE VISIT (OUTPATIENT)
Dept: FAMILY MEDICINE CLINIC | Age: 82
End: 2017-12-27

## 2017-12-27 ENCOUNTER — HOSPITAL ENCOUNTER (OUTPATIENT)
Age: 82
Setting detail: SPECIMEN
Discharge: HOME | End: 2017-12-27
Payer: MEDICARE

## 2017-12-27 VITALS
WEIGHT: 108.8 LBS | OXYGEN SATURATION: 97 % | SYSTOLIC BLOOD PRESSURE: 158 MMHG | HEART RATE: 71 BPM | TEMPERATURE: 98.1 F | BODY MASS INDEX: 25.29 KG/M2 | DIASTOLIC BLOOD PRESSURE: 64 MMHG

## 2017-12-27 DIAGNOSIS — I10 ESSENTIAL HYPERTENSION: ICD-10-CM

## 2017-12-27 DIAGNOSIS — R42 LIGHTHEADEDNESS: ICD-10-CM

## 2017-12-27 DIAGNOSIS — R42 LIGHTHEADEDNESS: Primary | ICD-10-CM

## 2017-12-27 LAB
HCT VFR BLD CALC: 34.6 % (ref 37–47)
HEMOGLOBIN: 11.3 G/DL (ref 12–16)
MCH RBC QN AUTO: 30.2 PG (ref 27–31.3)
MCHC RBC AUTO-ENTMCNC: 32.6 % (ref 33–37)
MCV RBC AUTO: 92.9 FL (ref 82–100)
PDW BLD-RTO: 14.8 % (ref 11.5–14.5)
PLATELET # BLD: 115 K/UL (ref 130–400)
RBC # BLD: 3.73 M/UL (ref 4.2–5.4)
WBC # BLD: 7.9 K/UL (ref 4.8–10.8)

## 2017-12-27 PROCEDURE — G8484 FLU IMMUNIZE NO ADMIN: HCPCS | Performed by: FAMILY MEDICINE

## 2017-12-27 PROCEDURE — 1036F TOBACCO NON-USER: CPT | Performed by: FAMILY MEDICINE

## 2017-12-27 PROCEDURE — 85027 COMPLETE CBC AUTOMATED: CPT

## 2017-12-27 PROCEDURE — G8417 CALC BMI ABV UP PARAM F/U: HCPCS | Performed by: FAMILY MEDICINE

## 2017-12-27 PROCEDURE — G8427 DOCREV CUR MEDS BY ELIG CLIN: HCPCS | Performed by: FAMILY MEDICINE

## 2017-12-27 PROCEDURE — 1090F PRES/ABSN URINE INCON ASSESS: CPT | Performed by: FAMILY MEDICINE

## 2017-12-27 PROCEDURE — 4040F PNEUMOC VAC/ADMIN/RCVD: CPT | Performed by: FAMILY MEDICINE

## 2017-12-27 PROCEDURE — 99214 OFFICE O/P EST MOD 30 MIN: CPT | Performed by: FAMILY MEDICINE

## 2017-12-27 PROCEDURE — G8598 ASA/ANTIPLAT THER USED: HCPCS | Performed by: FAMILY MEDICINE

## 2017-12-27 PROCEDURE — 1123F ACP DISCUSS/DSCN MKR DOCD: CPT | Performed by: FAMILY MEDICINE

## 2017-12-27 RX ORDER — MECLIZINE HCL 12.5 MG/1
12.5 TABLET ORAL 3 TIMES DAILY PRN
Qty: 30 TABLET | Refills: 1 | Status: SHIPPED | OUTPATIENT
Start: 2017-12-27 | End: 2018-02-09 | Stop reason: SDUPTHER

## 2017-12-27 ASSESSMENT — ENCOUNTER SYMPTOMS
DIARRHEA: 0
RHINORRHEA: 0
COUGH: 0
ABDOMINAL PAIN: 0
SORE THROAT: 0
SHORTNESS OF BREATH: 0
WHEEZING: 0
CONSTIPATION: 0

## 2018-02-08 RX ORDER — ISOSORBIDE MONONITRATE 30 MG/1
TABLET, EXTENDED RELEASE ORAL
Qty: 30 TABLET | Refills: 2 | Status: SHIPPED | OUTPATIENT
Start: 2018-02-08 | End: 2018-04-13 | Stop reason: SDUPTHER

## 2018-02-09 DIAGNOSIS — R42 LIGHTHEADEDNESS: ICD-10-CM

## 2018-02-09 RX ORDER — MECLIZINE HCL 12.5 MG/1
12.5 TABLET ORAL 3 TIMES DAILY PRN
Qty: 30 TABLET | Refills: 0 | Status: SHIPPED | OUTPATIENT
Start: 2018-02-09 | End: 2018-03-15 | Stop reason: SDUPTHER

## 2018-02-09 RX ORDER — BLOOD SUGAR DIAGNOSTIC
STRIP MISCELLANEOUS
Qty: 100 STRIP | Refills: 5 | Status: SHIPPED | OUTPATIENT
Start: 2018-02-09 | End: 2018-08-14 | Stop reason: SDUPTHER

## 2018-02-26 NOTE — TELEPHONE ENCOUNTER
Medication: Simvastatin    Last office visit: 12/27/2017    Last labs: 9*/2/2017    Last filled: 12/14/2017

## 2018-02-27 RX ORDER — SIMVASTATIN 40 MG
TABLET ORAL
Qty: 30 TABLET | Refills: 2 | Status: SHIPPED | OUTPATIENT
Start: 2018-02-27 | End: 2018-05-02 | Stop reason: SDUPTHER

## 2018-02-27 NOTE — TELEPHONE ENCOUNTER
Medication: Simvastatin     Last office visit: 12/27/2017    Last labs: 12/27/2017-CBC,3/7/2017, Lipid 8/25/2016    Last filled: 10/14/2017    DMW    Pt states she will have to make a lab appt when her daughter is available to bring her in.

## 2018-02-28 RX ORDER — SIMVASTATIN 40 MG
TABLET ORAL
Qty: 30 TABLET | OUTPATIENT
Start: 2018-02-28

## 2018-03-15 ENCOUNTER — HOSPITAL ENCOUNTER (OUTPATIENT)
Age: 83
Setting detail: SPECIMEN
Discharge: HOME | End: 2018-03-15
Payer: MEDICARE

## 2018-03-15 ENCOUNTER — NURSE ONLY (OUTPATIENT)
Dept: FAMILY MEDICINE CLINIC | Age: 83
End: 2018-03-15
Payer: MEDICARE

## 2018-03-15 DIAGNOSIS — D64.9 ANEMIA, UNSPECIFIED TYPE: ICD-10-CM

## 2018-03-15 DIAGNOSIS — Z79.4 TYPE 2 DIABETES MELLITUS WITH DIABETIC POLYNEUROPATHY, WITH LONG-TERM CURRENT USE OF INSULIN (HCC): ICD-10-CM

## 2018-03-15 DIAGNOSIS — R42 LIGHTHEADEDNESS: ICD-10-CM

## 2018-03-15 DIAGNOSIS — E78.49 OTHER HYPERLIPIDEMIA: Primary | ICD-10-CM

## 2018-03-15 DIAGNOSIS — E11.42 TYPE 2 DIABETES MELLITUS WITH DIABETIC POLYNEUROPATHY, WITH LONG-TERM CURRENT USE OF INSULIN (HCC): ICD-10-CM

## 2018-03-15 DIAGNOSIS — E78.49 OTHER HYPERLIPIDEMIA: ICD-10-CM

## 2018-03-15 LAB
ANION GAP SERPL CALCULATED.3IONS-SCNC: 15 MEQ/L (ref 7–13)
BUN BLDV-MCNC: 20 MG/DL (ref 8–23)
CALCIUM SERPL-MCNC: 9 MG/DL (ref 8.6–10.2)
CHLORIDE BLD-SCNC: 104 MEQ/L (ref 98–107)
CHOLESTEROL, TOTAL: 152 MG/DL (ref 0–199)
CO2: 26 MEQ/L (ref 22–29)
CREAT SERPL-MCNC: 0.8 MG/DL (ref 0.5–0.9)
GFR AFRICAN AMERICAN: >60
GFR NON-AFRICAN AMERICAN: >60
GLUCOSE BLD-MCNC: 180 MG/DL (ref 74–109)
HBA1C MFR BLD: 7.4 % (ref 4.8–5.9)
HCT VFR BLD CALC: 38.8 % (ref 37–47)
HDLC SERPL-MCNC: 63 MG/DL (ref 40–59)
HEMOGLOBIN: 12.3 G/DL (ref 12–16)
LDL CHOLESTEROL CALCULATED: 67 MG/DL (ref 0–129)
MCH RBC QN AUTO: 29.7 PG (ref 27–31.3)
MCHC RBC AUTO-ENTMCNC: 31.8 % (ref 33–37)
MCV RBC AUTO: 93.4 FL (ref 82–100)
PDW BLD-RTO: 15.2 % (ref 11.5–14.5)
PLATELET # BLD: 118 K/UL (ref 130–400)
POTASSIUM SERPL-SCNC: 4.6 MEQ/L (ref 3.5–5.1)
RBC # BLD: 4.16 M/UL (ref 4.2–5.4)
SODIUM BLD-SCNC: 145 MEQ/L (ref 132–144)
TRIGL SERPL-MCNC: 112 MG/DL (ref 0–200)
WBC # BLD: 7.8 K/UL (ref 4.8–10.8)

## 2018-03-15 PROCEDURE — 85027 COMPLETE CBC AUTOMATED: CPT

## 2018-03-15 PROCEDURE — 80061 LIPID PANEL: CPT

## 2018-03-15 PROCEDURE — 83036 HEMOGLOBIN GLYCOSYLATED A1C: CPT

## 2018-03-15 PROCEDURE — 80048 BASIC METABOLIC PNL TOTAL CA: CPT

## 2018-03-15 PROCEDURE — 36415 COLL VENOUS BLD VENIPUNCTURE: CPT

## 2018-03-15 PROCEDURE — 36415 COLL VENOUS BLD VENIPUNCTURE: CPT | Performed by: FAMILY MEDICINE

## 2018-03-15 RX ORDER — MECLIZINE HCL 12.5 MG/1
12.5 TABLET ORAL 3 TIMES DAILY PRN
Qty: 30 TABLET | Refills: 3 | Status: SHIPPED | OUTPATIENT
Start: 2018-03-15 | End: 2018-03-25

## 2018-03-27 ENCOUNTER — OFFICE VISIT (OUTPATIENT)
Dept: FAMILY MEDICINE CLINIC | Age: 83
End: 2018-03-27
Payer: MEDICARE

## 2018-03-27 VITALS
OXYGEN SATURATION: 97 % | DIASTOLIC BLOOD PRESSURE: 62 MMHG | SYSTOLIC BLOOD PRESSURE: 138 MMHG | WEIGHT: 109.8 LBS | BODY MASS INDEX: 25.52 KG/M2 | TEMPERATURE: 98.2 F | HEART RATE: 68 BPM

## 2018-03-27 DIAGNOSIS — Z00.00 ANNUAL PHYSICAL EXAM: Primary | ICD-10-CM

## 2018-03-27 DIAGNOSIS — Z23 NEED FOR PROPHYLACTIC VACCINATION AND INOCULATION AGAINST VARICELLA: ICD-10-CM

## 2018-03-27 DIAGNOSIS — Z79.4 TYPE 2 DIABETES MELLITUS WITH DIABETIC POLYNEUROPATHY, WITH LONG-TERM CURRENT USE OF INSULIN (HCC): ICD-10-CM

## 2018-03-27 DIAGNOSIS — E11.42 TYPE 2 DIABETES MELLITUS WITH DIABETIC POLYNEUROPATHY, WITH LONG-TERM CURRENT USE OF INSULIN (HCC): ICD-10-CM

## 2018-03-27 DIAGNOSIS — E03.9 HYPOTHYROIDISM, UNSPECIFIED TYPE: ICD-10-CM

## 2018-03-27 DIAGNOSIS — Z23 NEED FOR PROPHYLACTIC VACCINATION AGAINST DIPHTHERIA-TETANUS-PERTUSSIS (DTP): ICD-10-CM

## 2018-03-27 DIAGNOSIS — E78.49 OTHER HYPERLIPIDEMIA: ICD-10-CM

## 2018-03-27 DIAGNOSIS — Z23 NEED FOR PROPHYLACTIC VACCINATION AGAINST STREPTOCOCCUS PNEUMONIAE (PNEUMOCOCCUS): ICD-10-CM

## 2018-03-27 DIAGNOSIS — E11.42 DIABETIC POLYNEUROPATHY ASSOCIATED WITH TYPE 2 DIABETES MELLITUS (HCC): ICD-10-CM

## 2018-03-27 PROCEDURE — 90670 PCV13 VACCINE IM: CPT | Performed by: FAMILY MEDICINE

## 2018-03-27 PROCEDURE — G0439 PPPS, SUBSEQ VISIT: HCPCS | Performed by: FAMILY MEDICINE

## 2018-03-27 PROCEDURE — G0009 ADMIN PNEUMOCOCCAL VACCINE: HCPCS | Performed by: FAMILY MEDICINE

## 2018-03-27 ASSESSMENT — ENCOUNTER SYMPTOMS
DIARRHEA: 0
COUGH: 0
SORE THROAT: 0
SHORTNESS OF BREATH: 0
WHEEZING: 0
ABDOMINAL PAIN: 0
CONSTIPATION: 0
RHINORRHEA: 0

## 2018-03-27 ASSESSMENT — PATIENT HEALTH QUESTIONNAIRE - PHQ9
SUM OF ALL RESPONSES TO PHQ QUESTIONS 1-9: 0
2. FEELING DOWN, DEPRESSED OR HOPELESS: 0
SUM OF ALL RESPONSES TO PHQ9 QUESTIONS 1 & 2: 0
1. LITTLE INTEREST OR PLEASURE IN DOING THINGS: 0

## 2018-03-27 NOTE — PROGRESS NOTES
[address] PRIMARY CARE  [address] [place] [phone]  Dept: [phone]  Dept Fax: [phone]: [phone]   Chief Complaint  Chief Complaint   Patient presents with    Follow-up     Chronic health care management        HPI:  80 y.o. female who presents for annual exam:    DM2: followed by Dr. Octavio Butterfield. Recent labs show a1c 7.4    Went over recent labs which has good cholesterol profile. She notes a \"stuck\" feeling in her throat at night when she sitting in bed.     Past Medical History:   Diagnosis Date    Anemia     Arthritis     Bleeding from breast 2003    R breast treated by dr Felisa Zhong CAD (coronary artery disease)     Cancer ([address]     melanoma and carcinoma    Carpal tunnel syndrome     Chronic back pain     Dupuytren's contracture of hand 2009    Left hand treated by dr Mario Le GERD (gastroesophageal reflux disease)     History of mammogram 7/2011    WNL    Hyperlipidemia     Hypertension     Obesity     Osteopenia     hips    Peripheral neuropathy ([address]     Presbyesophagus 2009    esophogram 3/19/2009    Shingles [id number]    Shingles     Type II or unspecified type diabetes mellitus without mention of complication, not stated as uncontrolled      Past Surgical History:   Procedure Laterality Date    APPENDECTOMY      CARDIAC CATHETERIZATION  04/06/2004    CARDIAC CATHETERIZATION  10/01/2002    CHOLECYSTECTOMY      COLONOSCOPY  2003    rectal hemangiomas, and colon polyps    COLONOSCOPY  04/29/2010    CYSTOSCOPY  2010    LARYNGOSCOPY  2008    MALIGNANT SKIN LESION EXCISION  2001   [facility] OTHER SURGICAL HISTORY  2012    right arm, several sutures applied from injury    PTCA  2002 no stents, 2005 two stents, 2004 two stents    UPPER GASTROINTESTINAL ENDOSCOPY  2003     Social History     Social History    Marital status:      Spouse name: N/A    Number of children: N/A    Years of education: N/A     Occupational History    Not on file.      Social History Main Topics    Smoking status: Never Smoker    Smokeless tobacco: Never Used    Alcohol use No    Drug use: No    Sexual activity: No     Other Topics Concern    Not on file     Social History Narrative    No narrative on file     Allergies   Allergen Reactions    Codeine Hives     Listed as getting hives from codeine but has repeatedly tolerated Vicodan and percocet without rash or allergy 2/16/12 HES    Glucophage [Metformin Hydrochloride] Diarrhea    Lisinopril Other (See Comments)     Makes pt cough    Lyrica [Pregabalin] Other (See Comments)     Sleep deprivation     Other      Naprolen    Percocet [Oxycodone-Acetaminophen] Swelling     ankles    Procardia [Nifedipine]     Ranitidine Hcl     Tagamet [Cimetidine] Diarrhea    Welchol [Colesevelam Hcl] Diarrhea    Sulfamethopyrazine Nausea And Vomiting     Current Outpatient Prescriptions   Medication Sig Dispense Refill    Tdap (ADACEL) 5-2-15.5 LF-MCG/0.5 injection Inject 0.5 mLs into the muscle once for 1 dose 0.5 mL 0    zoster recombinant adjuvanted vaccine (SHINGRIX) 50 MCG SUSR injection Inject 0.5 mLs into the muscle once for 1 dose 0.5 mL 0    [employer]ART UNILET LANCETS 28G MISC 1 each by Does not apply route 3 times daily DX: E11.65, IDDM 100 each 6    simvastatin (ZOCOR) 40 MG tablet TAKE 1 TABLET BY MOUTH nightly 30 tablet 2    FREESTYLE TEST STRIPS strip test 3 times daily 100 strip 5    isosorbide mononitrate (IMDUR) 30 MG extended release tablet TAKE 1 TABLET BY MOUTH DAILY 30 tablet 2    Fexofenadine HCl (ALLEGRA ALLERGY PO) Take by mouth      NOVOFINE 32G X 6 MM MISC use 3 TO 4 times daily AS DIRECTED 100 each 3    dicyclomine (BENTYL) 10 MG capsule Take 1 capsule by mouth every 6 hours as needed (cramps) 20 capsule 0    levothyroxine (SYNTHROID) 75 MCG tablet TAKE 1 TABLET EVERY DAY 30 tablet 11    HUMALOG KWIKPEN 100 UNIT/ML pen inject 4 Physical exam:  Physical Exam   Constitutional: She is oriented to person, place, and time. She appears well-developed and well-nourished. No distress. HENT:   Head: Normocephalic and atraumatic. Mouth/Throat: No oropharyngeal exudate. Eyes: EOM are normal.   Neck: Normal range of motion. No thyromegaly present. Cardiovascular: Normal rate, regular rhythm and normal heart sounds. No murmur heard. Pulmonary/Chest: Effort normal and breath sounds normal. No respiratory distress. She has no wheezes. Abdominal: Soft. She exhibits no distension. There is no tenderness. There is no rebound and no guarding. Musculoskeletal: She exhibits no edema. Lymphadenopathy:     She has no cervical adenopathy. Neurological: She is alert and oriented to person, place, and time. Skin: Skin is warm and dry. Psychiatric: She has a normal mood and affect. Her behavior is normal.   Vitals reviewed. Assessment/Plan:  80 y.o. female here mainly for annual exam:  - due for routine vaccines. 1. Annual physical exam     2. Type 2 diabetes mellitus with diabetic polyneuropathy, with long-term current use of insulin (HCC)     3. Other hyperlipidemia     4. Hypothyroidism, unspecified type     5. Need for prophylactic vaccination against Streptococcus pneumoniae (pneumococcus)  Pneumococcal conjugate vaccine 13-valent PCV13   6. Need for prophylactic vaccination against diphtheria-tetanus-pertussis (DTP)  Tdap (ADACEL) 5-2-15.5 LF-MCG/0.5 injection   7.  Need for prophylactic vaccination and inoculation against varicella  zoster recombinant adjuvanted vaccine (SHINGRIX) 50 MCG SUSR injection    DISCONTINUED: zoster recombinant adjuvanted vaccine ([address] [place] [address] [address] [place]) 50 MCG SUSR injection        Return in about 1 year (around 3/27/2019) for annual exam.    Julianne Zamorano MD

## 2018-04-13 RX ORDER — ISOSORBIDE MONONITRATE 30 MG/1
TABLET, EXTENDED RELEASE ORAL
Qty: 30 TABLET | Refills: 3 | Status: SHIPPED | OUTPATIENT
Start: 2018-04-13 | End: 2018-08-14 | Stop reason: SDUPTHER

## 2018-04-18 ENCOUNTER — OFFICE VISIT (OUTPATIENT)
Dept: ENDOCRINOLOGY | Age: 83
End: 2018-04-18
Payer: MEDICARE

## 2018-04-18 VITALS
BODY MASS INDEX: 25.22 KG/M2 | HEIGHT: 55 IN | DIASTOLIC BLOOD PRESSURE: 64 MMHG | HEART RATE: 67 BPM | SYSTOLIC BLOOD PRESSURE: 217 MMHG | WEIGHT: 109 LBS

## 2018-04-18 DIAGNOSIS — I10 ESSENTIAL HYPERTENSION: ICD-10-CM

## 2018-04-18 DIAGNOSIS — Z79.4 TYPE 2 DIABETES MELLITUS WITH DIABETIC POLYNEUROPATHY, WITH LONG-TERM CURRENT USE OF INSULIN (HCC): Primary | ICD-10-CM

## 2018-04-18 DIAGNOSIS — E11.42 TYPE 2 DIABETES MELLITUS WITH DIABETIC POLYNEUROPATHY, WITH LONG-TERM CURRENT USE OF INSULIN (HCC): Primary | ICD-10-CM

## 2018-04-18 LAB — GLUCOSE BLD-MCNC: 228 MG/DL

## 2018-04-18 PROCEDURE — G8427 DOCREV CUR MEDS BY ELIG CLIN: HCPCS | Performed by: INTERNAL MEDICINE

## 2018-04-18 PROCEDURE — 4040F PNEUMOC VAC/ADMIN/RCVD: CPT | Performed by: INTERNAL MEDICINE

## 2018-04-18 PROCEDURE — 1123F ACP DISCUSS/DSCN MKR DOCD: CPT | Performed by: INTERNAL MEDICINE

## 2018-04-18 PROCEDURE — 1036F TOBACCO NON-USER: CPT | Performed by: INTERNAL MEDICINE

## 2018-04-18 PROCEDURE — G8417 CALC BMI ABV UP PARAM F/U: HCPCS | Performed by: INTERNAL MEDICINE

## 2018-04-18 PROCEDURE — G8598 ASA/ANTIPLAT THER USED: HCPCS | Performed by: INTERNAL MEDICINE

## 2018-04-18 PROCEDURE — 1090F PRES/ABSN URINE INCON ASSESS: CPT | Performed by: INTERNAL MEDICINE

## 2018-04-18 PROCEDURE — 99213 OFFICE O/P EST LOW 20 MIN: CPT | Performed by: INTERNAL MEDICINE

## 2018-04-18 PROCEDURE — 82962 GLUCOSE BLOOD TEST: CPT | Performed by: INTERNAL MEDICINE

## 2018-04-18 RX ORDER — AMLODIPINE BESYLATE 2.5 MG/1
2.5 TABLET ORAL DAILY
Qty: 30 TABLET | Refills: 3 | Status: SHIPPED | OUTPATIENT
Start: 2018-04-18 | End: 2018-05-15

## 2018-05-04 RX ORDER — SIMVASTATIN 40 MG
TABLET ORAL
Qty: 30 TABLET | Refills: 11 | Status: SHIPPED | OUTPATIENT
Start: 2018-05-04 | End: 2019-04-12 | Stop reason: SDUPTHER

## 2018-05-07 RX ORDER — ISOSORBIDE MONONITRATE 30 MG/1
TABLET, EXTENDED RELEASE ORAL
Qty: 30 TABLET | OUTPATIENT
Start: 2018-05-07

## 2018-05-15 ENCOUNTER — OFFICE VISIT (OUTPATIENT)
Dept: FAMILY MEDICINE CLINIC | Age: 83
End: 2018-05-15
Payer: MEDICARE

## 2018-05-15 VITALS
HEART RATE: 68 BPM | OXYGEN SATURATION: 98 % | SYSTOLIC BLOOD PRESSURE: 178 MMHG | WEIGHT: 109 LBS | HEIGHT: 55 IN | BODY MASS INDEX: 25.22 KG/M2 | DIASTOLIC BLOOD PRESSURE: 60 MMHG | TEMPERATURE: 98.1 F

## 2018-05-15 DIAGNOSIS — I10 ESSENTIAL HYPERTENSION: Primary | ICD-10-CM

## 2018-05-15 PROCEDURE — 99213 OFFICE O/P EST LOW 20 MIN: CPT | Performed by: FAMILY MEDICINE

## 2018-05-15 PROCEDURE — 1036F TOBACCO NON-USER: CPT | Performed by: FAMILY MEDICINE

## 2018-05-15 PROCEDURE — 1123F ACP DISCUSS/DSCN MKR DOCD: CPT | Performed by: FAMILY MEDICINE

## 2018-05-15 PROCEDURE — G8417 CALC BMI ABV UP PARAM F/U: HCPCS | Performed by: FAMILY MEDICINE

## 2018-05-15 PROCEDURE — 4040F PNEUMOC VAC/ADMIN/RCVD: CPT | Performed by: FAMILY MEDICINE

## 2018-05-15 PROCEDURE — G8598 ASA/ANTIPLAT THER USED: HCPCS | Performed by: FAMILY MEDICINE

## 2018-05-15 PROCEDURE — 1090F PRES/ABSN URINE INCON ASSESS: CPT | Performed by: FAMILY MEDICINE

## 2018-05-15 PROCEDURE — G8427 DOCREV CUR MEDS BY ELIG CLIN: HCPCS | Performed by: FAMILY MEDICINE

## 2018-05-15 RX ORDER — AMLODIPINE BESYLATE 2.5 MG/1
5 TABLET ORAL DAILY
Qty: 60 TABLET | Refills: 1 | Status: SHIPPED | OUTPATIENT
Start: 2018-05-15 | End: 2018-05-29

## 2018-05-15 ASSESSMENT — ENCOUNTER SYMPTOMS
RHINORRHEA: 0
COUGH: 0
WHEEZING: 0
SORE THROAT: 0
DIARRHEA: 0
SHORTNESS OF BREATH: 0
ABDOMINAL PAIN: 0
CONSTIPATION: 0

## 2018-05-29 ENCOUNTER — OFFICE VISIT (OUTPATIENT)
Dept: FAMILY MEDICINE CLINIC | Age: 83
End: 2018-05-29
Payer: MEDICARE

## 2018-05-29 VITALS
HEART RATE: 77 BPM | DIASTOLIC BLOOD PRESSURE: 54 MMHG | BODY MASS INDEX: 25.87 KG/M2 | TEMPERATURE: 98.1 F | OXYGEN SATURATION: 98 % | HEIGHT: 55 IN | WEIGHT: 111.8 LBS | SYSTOLIC BLOOD PRESSURE: 188 MMHG

## 2018-05-29 DIAGNOSIS — I10 SYSTOLIC HYPERTENSION: Primary | ICD-10-CM

## 2018-05-29 DIAGNOSIS — I25.10 CORONARY ARTERY DISEASE INVOLVING NATIVE CORONARY ARTERY OF NATIVE HEART WITHOUT ANGINA PECTORIS: ICD-10-CM

## 2018-05-29 PROCEDURE — 1123F ACP DISCUSS/DSCN MKR DOCD: CPT | Performed by: FAMILY MEDICINE

## 2018-05-29 PROCEDURE — 4040F PNEUMOC VAC/ADMIN/RCVD: CPT | Performed by: FAMILY MEDICINE

## 2018-05-29 PROCEDURE — G8417 CALC BMI ABV UP PARAM F/U: HCPCS | Performed by: FAMILY MEDICINE

## 2018-05-29 PROCEDURE — G8598 ASA/ANTIPLAT THER USED: HCPCS | Performed by: FAMILY MEDICINE

## 2018-05-29 PROCEDURE — G8427 DOCREV CUR MEDS BY ELIG CLIN: HCPCS | Performed by: FAMILY MEDICINE

## 2018-05-29 PROCEDURE — 1036F TOBACCO NON-USER: CPT | Performed by: FAMILY MEDICINE

## 2018-05-29 PROCEDURE — 99213 OFFICE O/P EST LOW 20 MIN: CPT | Performed by: FAMILY MEDICINE

## 2018-05-29 PROCEDURE — 1090F PRES/ABSN URINE INCON ASSESS: CPT | Performed by: FAMILY MEDICINE

## 2018-05-29 RX ORDER — AMLODIPINE BESYLATE 2.5 MG/1
2.5 TABLET ORAL 3 TIMES DAILY
Qty: 90 TABLET | Refills: 3 | Status: SHIPPED | OUTPATIENT
Start: 2018-05-29 | End: 2018-06-29

## 2018-05-29 ASSESSMENT — ENCOUNTER SYMPTOMS
COUGH: 0
RHINORRHEA: 0
ABDOMINAL PAIN: 0
CONSTIPATION: 0
SHORTNESS OF BREATH: 0
DIARRHEA: 0
WHEEZING: 0
SORE THROAT: 0

## 2018-06-15 RX ORDER — LEVOTHYROXINE SODIUM 0.07 MG/1
TABLET ORAL
Qty: 30 TABLET | Refills: 11 | Status: SHIPPED | OUTPATIENT
Start: 2018-06-15 | End: 2019-07-03 | Stop reason: SDUPTHER

## 2018-06-29 ENCOUNTER — OFFICE VISIT (OUTPATIENT)
Dept: FAMILY MEDICINE CLINIC | Age: 83
End: 2018-06-29
Payer: MEDICARE

## 2018-06-29 VITALS
BODY MASS INDEX: 25.33 KG/M2 | DIASTOLIC BLOOD PRESSURE: 50 MMHG | HEART RATE: 70 BPM | SYSTOLIC BLOOD PRESSURE: 118 MMHG | WEIGHT: 109 LBS | RESPIRATION RATE: 14 BRPM | OXYGEN SATURATION: 96 %

## 2018-06-29 DIAGNOSIS — I10 SYSTOLIC HYPERTENSION: Primary | ICD-10-CM

## 2018-06-29 DIAGNOSIS — M19.91 PRIMARY OSTEOARTHRITIS, UNSPECIFIED SITE: ICD-10-CM

## 2018-06-29 DIAGNOSIS — R26.81 UNSTEADY GAIT: ICD-10-CM

## 2018-06-29 PROCEDURE — 99214 OFFICE O/P EST MOD 30 MIN: CPT | Performed by: FAMILY MEDICINE

## 2018-06-29 PROCEDURE — 1036F TOBACCO NON-USER: CPT | Performed by: FAMILY MEDICINE

## 2018-06-29 PROCEDURE — G8427 DOCREV CUR MEDS BY ELIG CLIN: HCPCS | Performed by: FAMILY MEDICINE

## 2018-06-29 PROCEDURE — 4040F PNEUMOC VAC/ADMIN/RCVD: CPT | Performed by: FAMILY MEDICINE

## 2018-06-29 PROCEDURE — 1123F ACP DISCUSS/DSCN MKR DOCD: CPT | Performed by: FAMILY MEDICINE

## 2018-06-29 PROCEDURE — G8598 ASA/ANTIPLAT THER USED: HCPCS | Performed by: FAMILY MEDICINE

## 2018-06-29 PROCEDURE — 1090F PRES/ABSN URINE INCON ASSESS: CPT | Performed by: FAMILY MEDICINE

## 2018-06-29 PROCEDURE — G8417 CALC BMI ABV UP PARAM F/U: HCPCS | Performed by: FAMILY MEDICINE

## 2018-06-29 RX ORDER — AMLODIPINE BESYLATE 5 MG/1
5 TABLET ORAL 2 TIMES DAILY
Qty: 60 TABLET | Refills: 3 | COMMUNITY
Start: 2018-06-29

## 2018-06-29 RX ORDER — LISINOPRIL 5 MG/1
TABLET ORAL
Refills: 3 | COMMUNITY
Start: 2018-06-04

## 2018-06-29 RX ORDER — HYDROCHLOROTHIAZIDE 25 MG/1
TABLET ORAL
Refills: 3 | COMMUNITY
Start: 2018-06-04 | End: 2020-06-04 | Stop reason: ALTCHOICE

## 2018-06-29 ASSESSMENT — ENCOUNTER SYMPTOMS
SORE THROAT: 0
CONSTIPATION: 0
DIARRHEA: 0
RHINORRHEA: 0
COUGH: 0
SHORTNESS OF BREATH: 0
ABDOMINAL PAIN: 0
WHEEZING: 0

## 2018-08-14 RX ORDER — ISOSORBIDE MONONITRATE 30 MG/1
TABLET, EXTENDED RELEASE ORAL
Qty: 30 TABLET | Refills: 11 | Status: SHIPPED | OUTPATIENT
Start: 2018-08-14 | End: 2019-08-15 | Stop reason: SDUPTHER

## 2018-10-08 ENCOUNTER — HOSPITAL ENCOUNTER (OUTPATIENT)
Age: 83
Setting detail: SPECIMEN
Discharge: HOME | End: 2018-10-08
Payer: MEDICARE

## 2018-10-08 ENCOUNTER — OFFICE VISIT (OUTPATIENT)
Dept: FAMILY MEDICINE CLINIC | Age: 83
End: 2018-10-08
Payer: MEDICARE

## 2018-10-08 VITALS
BODY MASS INDEX: 24.76 KG/M2 | HEART RATE: 62 BPM | OXYGEN SATURATION: 98 % | DIASTOLIC BLOOD PRESSURE: 50 MMHG | HEIGHT: 55 IN | WEIGHT: 107 LBS | SYSTOLIC BLOOD PRESSURE: 170 MMHG

## 2018-10-08 DIAGNOSIS — E11.42 TYPE 2 DIABETES MELLITUS WITH DIABETIC POLYNEUROPATHY, WITH LONG-TERM CURRENT USE OF INSULIN (HCC): ICD-10-CM

## 2018-10-08 DIAGNOSIS — I10 SYSTOLIC HYPERTENSION: Primary | ICD-10-CM

## 2018-10-08 DIAGNOSIS — Z23 ENCOUNTER FOR VACCINATION: ICD-10-CM

## 2018-10-08 DIAGNOSIS — Z79.4 TYPE 2 DIABETES MELLITUS WITH DIABETIC POLYNEUROPATHY, WITH LONG-TERM CURRENT USE OF INSULIN (HCC): ICD-10-CM

## 2018-10-08 LAB
ANION GAP SERPL CALCULATED.3IONS-SCNC: 13 MEQ/L (ref 7–13)
BUN BLDV-MCNC: 20 MG/DL (ref 8–23)
CALCIUM SERPL-MCNC: 9.1 MG/DL (ref 8.6–10.2)
CHLORIDE BLD-SCNC: 102 MEQ/L (ref 98–107)
CO2: 24 MEQ/L (ref 22–29)
CREAT SERPL-MCNC: 0.93 MG/DL (ref 0.5–0.9)
GFR AFRICAN AMERICAN: >60
GFR NON-AFRICAN AMERICAN: 55.9
GLUCOSE BLD-MCNC: 113 MG/DL (ref 74–109)
HBA1C MFR BLD: 7.3 % (ref 4.8–5.9)
POTASSIUM SERPL-SCNC: 4.3 MEQ/L (ref 3.5–5.1)
SODIUM BLD-SCNC: 139 MEQ/L (ref 132–144)

## 2018-10-08 PROCEDURE — G8482 FLU IMMUNIZE ORDER/ADMIN: HCPCS | Performed by: FAMILY MEDICINE

## 2018-10-08 PROCEDURE — 99214 OFFICE O/P EST MOD 30 MIN: CPT | Performed by: FAMILY MEDICINE

## 2018-10-08 PROCEDURE — 90662 IIV NO PRSV INCREASED AG IM: CPT | Performed by: FAMILY MEDICINE

## 2018-10-08 PROCEDURE — 80048 BASIC METABOLIC PNL TOTAL CA: CPT

## 2018-10-08 PROCEDURE — G8427 DOCREV CUR MEDS BY ELIG CLIN: HCPCS | Performed by: FAMILY MEDICINE

## 2018-10-08 PROCEDURE — 83036 HEMOGLOBIN GLYCOSYLATED A1C: CPT

## 2018-10-08 PROCEDURE — G0008 ADMIN INFLUENZA VIRUS VAC: HCPCS | Performed by: FAMILY MEDICINE

## 2018-10-08 PROCEDURE — 1101F PT FALLS ASSESS-DOCD LE1/YR: CPT | Performed by: FAMILY MEDICINE

## 2018-10-08 PROCEDURE — G8420 CALC BMI NORM PARAMETERS: HCPCS | Performed by: FAMILY MEDICINE

## 2018-10-08 PROCEDURE — 1090F PRES/ABSN URINE INCON ASSESS: CPT | Performed by: FAMILY MEDICINE

## 2018-10-08 PROCEDURE — 4040F PNEUMOC VAC/ADMIN/RCVD: CPT | Performed by: FAMILY MEDICINE

## 2018-10-08 PROCEDURE — 1036F TOBACCO NON-USER: CPT | Performed by: FAMILY MEDICINE

## 2018-10-08 PROCEDURE — 1123F ACP DISCUSS/DSCN MKR DOCD: CPT | Performed by: FAMILY MEDICINE

## 2018-10-08 PROCEDURE — G8598 ASA/ANTIPLAT THER USED: HCPCS | Performed by: FAMILY MEDICINE

## 2018-10-08 ASSESSMENT — ENCOUNTER SYMPTOMS
WHEEZING: 0
DIARRHEA: 0
COUGH: 0
SHORTNESS OF BREATH: 0
ABDOMINAL PAIN: 0
SORE THROAT: 0
RHINORRHEA: 0
CONSTIPATION: 0

## 2018-10-08 NOTE — PROGRESS NOTES
Vaccine Information Sheet, \"Influenza - Inactivated\"  given to Jacob Garza, or parent/legal guardian of  Jacob Garza and verbalized understanding. Patient responses:    Have you ever had a reaction to a flu vaccine? No  Are you able to eat eggs without adverse effects? Yes  Do you have any current illness? No  Have you ever had Guillian Varney Syndrome? No    Flu vaccine given per order. Please see immunization tab.

## 2018-10-17 RX ORDER — LANCETS 28 GAUGE
EACH MISCELLANEOUS
Qty: 100 EACH | Refills: 2 | Status: SHIPPED | OUTPATIENT
Start: 2018-10-17 | End: 2019-03-12 | Stop reason: SDUPTHER

## 2018-10-18 ENCOUNTER — OFFICE VISIT (OUTPATIENT)
Dept: ENDOCRINOLOGY | Age: 83
End: 2018-10-18
Payer: MEDICARE

## 2018-10-18 VITALS
OXYGEN SATURATION: 98 % | WEIGHT: 106 LBS | SYSTOLIC BLOOD PRESSURE: 163 MMHG | BODY MASS INDEX: 24.53 KG/M2 | HEART RATE: 61 BPM | DIASTOLIC BLOOD PRESSURE: 57 MMHG | HEIGHT: 55 IN

## 2018-10-18 DIAGNOSIS — E03.9 HYPOTHYROIDISM, UNSPECIFIED TYPE: ICD-10-CM

## 2018-10-18 DIAGNOSIS — N18.30 STAGE 3 CHRONIC KIDNEY DISEASE (HCC): ICD-10-CM

## 2018-10-18 DIAGNOSIS — E11.65 UNCONTROLLED TYPE 2 DIABETES MELLITUS WITH HYPERGLYCEMIA (HCC): Primary | ICD-10-CM

## 2018-10-18 PROCEDURE — 99213 OFFICE O/P EST LOW 20 MIN: CPT | Performed by: INTERNAL MEDICINE

## 2018-10-18 PROCEDURE — 1123F ACP DISCUSS/DSCN MKR DOCD: CPT | Performed by: INTERNAL MEDICINE

## 2018-10-18 PROCEDURE — G8482 FLU IMMUNIZE ORDER/ADMIN: HCPCS | Performed by: INTERNAL MEDICINE

## 2018-10-18 PROCEDURE — 1090F PRES/ABSN URINE INCON ASSESS: CPT | Performed by: INTERNAL MEDICINE

## 2018-10-18 PROCEDURE — 1101F PT FALLS ASSESS-DOCD LE1/YR: CPT | Performed by: INTERNAL MEDICINE

## 2018-10-18 PROCEDURE — 1036F TOBACCO NON-USER: CPT | Performed by: INTERNAL MEDICINE

## 2018-10-18 PROCEDURE — G8420 CALC BMI NORM PARAMETERS: HCPCS | Performed by: INTERNAL MEDICINE

## 2018-10-18 PROCEDURE — G8598 ASA/ANTIPLAT THER USED: HCPCS | Performed by: INTERNAL MEDICINE

## 2018-10-18 PROCEDURE — 4040F PNEUMOC VAC/ADMIN/RCVD: CPT | Performed by: INTERNAL MEDICINE

## 2018-10-18 PROCEDURE — G8428 CUR MEDS NOT DOCUMENT: HCPCS | Performed by: INTERNAL MEDICINE

## 2018-10-18 NOTE — PROGRESS NOTES
lidocaine (XYLOCAINE) 5 % ointment, Apply topically as needed. , Disp: 50 g, Rfl: 1    fluticasone (FLONASE) 50 MCG/ACT nasal spray, 1 spray by Nasal route daily, Disp: 1 Bottle, Rfl: 3    Loratadine 10 MG CAPS, Take 10 mg by mouth daily , Disp: , Rfl:     Potassium 99 MG TABS, Take  by mouth daily. , Disp: , Rfl:     traMADol (ULTRAM) 50 MG tablet, Take 1 tablet by mouth every 6 hours as needed. , Disp: 90 tablet, Rfl: 0    Multiple Vitamins-Minerals (MULTIVITAL PO), Take 1 tablet by mouth daily. , Disp: , Rfl:     omeprazole (PRILOSEC) 20 MG capsule, Take 20 mg by mouth daily. , Disp: , Rfl:     nitroGLYCERIN (NITROQUICK) 0.4 MG SL tablet, Place 0.4 mg under the tongue every 5 minutes as needed. , Disp: , Rfl:     Omega-3 Fatty Acids (FISH OIL) 1200 MG CAPS, Take  by mouth 2 times daily. , Disp: , Rfl:     aspirin 81 MG tablet, Take 81 mg by mouth daily. , Disp: , Rfl:     Review of Systems   Constitutional: Positive for fatigue. Eyes: Positive for visual disturbance. Cardiovascular: Negative. Endocrine: Negative. All other systems reviewed and are negative. Vitals:    10/18/18 1057 10/18/18 1106   BP: (!) 170/55 (!) 163/57   Site: Right Upper Arm Right Upper Arm   Position: Sitting Sitting   Cuff Size: Medium Adult Medium Adult   Pulse: 61    SpO2: 98%    Weight: 106 lb (48.1 kg)    Height: 4' 7\" (1.397 m)        Objective:   Physical Exam   Constitutional: She appears well-developed and well-nourished. Eyes: Conjunctivae are normal.   Neck: Neck supple. Cardiovascular: Normal rate. Pulmonary/Chest: Effort normal.   Musculoskeletal: Normal range of motion. Neurological: She is alert. Psychiatric: Her mood appears anxious. Results for Mary Rosales (MRN [id number]) as of 10/23/2018 07:02   Ref.  Range 10/8/2018 17:30   Sodium Latest Ref Range: 132 - 144 mEq/L 139   Potassium Latest Ref Range: 3.5 - 5.1 mEq/L 4.3   Chloride Latest Ref Range: 98 - 107 mEq/L 102   CO2 Latest Ref Range: 22 - 29 mEq/L 24   BUN Latest Ref Range: 8 - 23 mg/dL 20   Creatinine Latest Ref Range: 0.50 - 0.90 mg/dL 0.93 (H)   Anion Gap Latest Ref Range: 7 - 13 mEq/L 13   GFR Non- Latest Ref Range: >60  55.9 (L)   GFR  Latest Ref Range: >60  >60.0   Glucose Latest Ref Range: 74 - 109 mg/dL 113 (H)   Calcium Latest Ref Range: 8.6 - 10.2 mg/dL 9.1   Hemoglobin A1C Latest Ref Range: 4.8 - 5.9 % 7.3 (H)     Assessment:       Diagnosis Orders   1. Uncontrolled type 2 diabetes mellitus with hyperglycemia (HCC)  Basic Metabolic Panel    Hemoglobin A1C   2. Stage 3 chronic kidney disease ([facility] [address]     3.  Hypothyroidism, unspecified type  T4, Free    TSH without Reflex           Plan:      Orders Placed This Encounter   Procedures    T4, Free     Standing Status:   Future     Standing Expiration Date:   10/18/2019    TSH without Reflex     Standing Status:   Future     Standing Expiration Date:   10/18/2019    Basic Metabolic Panel     Standing Status:   Future     Standing Expiration Date:   10/18/2019    Hemoglobin A1C     Standing Status:   Future     Standing Expiration Date:   10/18/2019     Continue lantus 6 units hs plus  Humalog on a sliding scale hbaic goal of 7 -8   Continue synthroid 75 mcg daily         Alonzo Guerrero MD

## 2018-11-08 ENCOUNTER — OFFICE VISIT (OUTPATIENT)
Dept: FAMILY MEDICINE CLINIC | Age: 83
End: 2018-11-08
Payer: MEDICARE

## 2018-11-08 VITALS
WEIGHT: 109.2 LBS | DIASTOLIC BLOOD PRESSURE: 60 MMHG | BODY MASS INDEX: 25.27 KG/M2 | HEIGHT: 55 IN | RESPIRATION RATE: 14 BRPM | OXYGEN SATURATION: 97 % | HEART RATE: 63 BPM | SYSTOLIC BLOOD PRESSURE: 130 MMHG | TEMPERATURE: 97.5 F

## 2018-11-08 DIAGNOSIS — Z79.4 TYPE 2 DIABETES MELLITUS WITH DIABETIC POLYNEUROPATHY, WITH LONG-TERM CURRENT USE OF INSULIN (HCC): ICD-10-CM

## 2018-11-08 DIAGNOSIS — I10 SYSTOLIC HYPERTENSION: Primary | ICD-10-CM

## 2018-11-08 DIAGNOSIS — G62.9 PERIPHERAL POLYNEUROPATHY: ICD-10-CM

## 2018-11-08 DIAGNOSIS — E11.42 TYPE 2 DIABETES MELLITUS WITH DIABETIC POLYNEUROPATHY, WITH LONG-TERM CURRENT USE OF INSULIN (HCC): ICD-10-CM

## 2018-11-08 DIAGNOSIS — M19.91 PRIMARY OSTEOARTHRITIS, UNSPECIFIED SITE: ICD-10-CM

## 2018-11-08 PROCEDURE — 1036F TOBACCO NON-USER: CPT | Performed by: FAMILY MEDICINE

## 2018-11-08 PROCEDURE — G8482 FLU IMMUNIZE ORDER/ADMIN: HCPCS | Performed by: FAMILY MEDICINE

## 2018-11-08 PROCEDURE — G8427 DOCREV CUR MEDS BY ELIG CLIN: HCPCS | Performed by: FAMILY MEDICINE

## 2018-11-08 PROCEDURE — 1090F PRES/ABSN URINE INCON ASSESS: CPT | Performed by: FAMILY MEDICINE

## 2018-11-08 PROCEDURE — 99214 OFFICE O/P EST MOD 30 MIN: CPT | Performed by: FAMILY MEDICINE

## 2018-11-08 PROCEDURE — G8417 CALC BMI ABV UP PARAM F/U: HCPCS | Performed by: FAMILY MEDICINE

## 2018-11-08 PROCEDURE — 1101F PT FALLS ASSESS-DOCD LE1/YR: CPT | Performed by: FAMILY MEDICINE

## 2018-11-08 PROCEDURE — G8598 ASA/ANTIPLAT THER USED: HCPCS | Performed by: FAMILY MEDICINE

## 2018-11-08 PROCEDURE — 4040F PNEUMOC VAC/ADMIN/RCVD: CPT | Performed by: FAMILY MEDICINE

## 2018-11-08 PROCEDURE — 1123F ACP DISCUSS/DSCN MKR DOCD: CPT | Performed by: FAMILY MEDICINE

## 2018-11-08 RX ORDER — SILICONE ADHESIVE 1.5" X 3"
SHEET (EA) TOPICAL
COMMUNITY
Start: 2018-10-31

## 2018-11-08 ASSESSMENT — ENCOUNTER SYMPTOMS
SORE THROAT: 0
COUGH: 0
WHEEZING: 0
DIARRHEA: 0
RHINORRHEA: 0
SHORTNESS OF BREATH: 0
ABDOMINAL PAIN: 0
CONSTIPATION: 0

## 2018-11-12 RX ORDER — PEN NEEDLE, DIABETIC 31 G X1/4"
NEEDLE, DISPOSABLE MISCELLANEOUS
Qty: 150 EACH | Refills: 3 | Status: SHIPPED | OUTPATIENT
Start: 2018-11-12 | End: 2019-06-10 | Stop reason: SDUPTHER

## 2018-11-14 ENCOUNTER — HOSPITAL ENCOUNTER (OUTPATIENT)
Age: 83
Setting detail: SPECIMEN
Discharge: HOME | End: 2018-11-14
Payer: MEDICARE

## 2018-11-14 DIAGNOSIS — Z79.4 TYPE 2 DIABETES MELLITUS WITH DIABETIC POLYNEUROPATHY, WITH LONG-TERM CURRENT USE OF INSULIN (HCC): ICD-10-CM

## 2018-11-14 DIAGNOSIS — E11.42 TYPE 2 DIABETES MELLITUS WITH DIABETIC POLYNEUROPATHY, WITH LONG-TERM CURRENT USE OF INSULIN (HCC): ICD-10-CM

## 2018-11-14 LAB
CREATININE URINE: 44.1 MG/DL
MICROALBUMIN UR-MCNC: 6.2 MG/DL
MICROALBUMIN/CREAT UR-RTO: 140.6 MG/G (ref 0–30)

## 2018-11-14 PROCEDURE — 82570 ASSAY OF URINE CREATININE: CPT

## 2018-11-14 PROCEDURE — 82043 UR ALBUMIN QUANTITATIVE: CPT

## 2019-01-07 RX ORDER — INSULIN GLARGINE 100 [IU]/ML
INJECTION, SOLUTION SUBCUTANEOUS
Qty: 15 ML | Refills: 3 | Status: SHIPPED | OUTPATIENT
Start: 2019-01-07 | End: 2020-01-29

## 2019-02-11 ENCOUNTER — APPOINTMENT (OUTPATIENT)
Dept: GENERAL RADIOLOGY | Age: 84
End: 2019-02-11
Payer: MEDICARE

## 2019-02-11 ENCOUNTER — APPOINTMENT (OUTPATIENT)
Dept: CT IMAGING | Age: 84
End: 2019-02-11
Payer: MEDICARE

## 2019-02-11 ENCOUNTER — HOSPITAL ENCOUNTER (EMERGENCY)
Age: 84
Discharge: HOME | End: 2019-02-11
Attending: EMERGENCY MEDICINE
Payer: MEDICARE

## 2019-02-11 VITALS
HEART RATE: 63 BPM | SYSTOLIC BLOOD PRESSURE: 161 MMHG | DIASTOLIC BLOOD PRESSURE: 67 MMHG | TEMPERATURE: 97.8 F | WEIGHT: 106 LBS | BODY MASS INDEX: 23.84 KG/M2 | HEIGHT: 56 IN | RESPIRATION RATE: 20 BRPM | OXYGEN SATURATION: 98 %

## 2019-02-11 DIAGNOSIS — S33.5XXA SPRAIN OF LOW BACK, INITIAL ENCOUNTER: ICD-10-CM
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
## 2019-02-11 DIAGNOSIS — T07.XXXA MULTIPLE CONTUSIONS: ICD-10-CM

## 2019-02-11 PROCEDURE — 6370000000 HC RX 637 (ALT 250 FOR IP): Performed by: EMERGENCY MEDICINE

## 2019-02-11 PROCEDURE — 73060 X-RAY EXAM OF HUMERUS: CPT

## 2019-02-11 PROCEDURE — 72125 CT NECK SPINE W/O DYE: CPT

## 2019-02-11 PROCEDURE — 72110 X-RAY EXAM L-2 SPINE 4/>VWS: CPT

## 2019-02-11 PROCEDURE — 99283 EMERGENCY DEPT VISIT LOW MDM: CPT

## 2019-02-11 PROCEDURE — 71045 X-RAY EXAM CHEST 1 VIEW: CPT

## 2019-02-11 RX ORDER — TRAMADOL HYDROCHLORIDE 50 MG/1
50 TABLET ORAL EVERY 8 HOURS PRN
Qty: 20 TABLET | Refills: 0 | Status: SHIPPED | OUTPATIENT
Start: 2019-02-11 | End: 2019-02-18

## 2019-02-11 RX ORDER — TRAMADOL HYDROCHLORIDE 50 MG/1
50 TABLET ORAL ONCE
Status: COMPLETED | OUTPATIENT
Start: 2019-02-11 | End: 2019-02-11

## 2019-02-11 RX ADMIN — TRAMADOL HYDROCHLORIDE 50 MG: 50 TABLET, FILM COATED ORAL at 10:28

## 2019-02-11 ASSESSMENT — PAIN DESCRIPTION - DESCRIPTORS: DESCRIPTORS: ACHING

## 2019-02-11 ASSESSMENT — ENCOUNTER SYMPTOMS
STRIDOR: 0
WHEEZING: 0
TROUBLE SWALLOWING: 0
VOMITING: 0
SINUS PRESSURE: 0
COUGH: 0
CHEST TIGHTNESS: 0
EYE PAIN: 0
FACIAL SWELLING: 0
DIARRHEA: 0
BLOOD IN STOOL: 0
EYE REDNESS: 0
VOICE CHANGE: 0
EYE DISCHARGE: 0
ABDOMINAL PAIN: 0
CONSTIPATION: 0
SHORTNESS OF BREATH: 0
BACK PAIN: 1
CHOKING: 0
SORE THROAT: 0

## 2019-02-11 ASSESSMENT — PAIN SCALES - GENERAL
PAINLEVEL_OUTOF10: 4
PAINLEVEL_OUTOF10: 8

## 2019-02-11 ASSESSMENT — PAIN DESCRIPTION - FREQUENCY: FREQUENCY: CONTINUOUS

## 2019-02-11 ASSESSMENT — PAIN DESCRIPTION - LOCATION: LOCATION: BACK;NECK;RIB CAGE

## 2019-02-20 ENCOUNTER — OFFICE VISIT (OUTPATIENT)
Dept: FAMILY MEDICINE CLINIC | Age: 84
End: 2019-02-20
Payer: MEDICARE

## 2019-02-20 VITALS
HEIGHT: 56 IN | TEMPERATURE: 98 F | HEART RATE: 60 BPM | RESPIRATION RATE: 12 BRPM | SYSTOLIC BLOOD PRESSURE: 120 MMHG | BODY MASS INDEX: 25.01 KG/M2 | DIASTOLIC BLOOD PRESSURE: 62 MMHG | OXYGEN SATURATION: 97 % | WEIGHT: 111.2 LBS

## 2019-02-20 DIAGNOSIS — R26.81 UNSTEADY GAIT: ICD-10-CM

## 2019-02-20 DIAGNOSIS — R29.6 FREQUENT FALLS: ICD-10-CM

## 2019-02-20 DIAGNOSIS — Z79.4 TYPE 2 DIABETES MELLITUS WITH DIABETIC POLYNEUROPATHY, WITH LONG-TERM CURRENT USE OF INSULIN (HCC): ICD-10-CM

## 2019-02-20 DIAGNOSIS — E11.42 TYPE 2 DIABETES MELLITUS WITH DIABETIC POLYNEUROPATHY, WITH LONG-TERM CURRENT USE OF INSULIN (HCC): ICD-10-CM

## 2019-02-20 DIAGNOSIS — Z09 HOSPITAL DISCHARGE FOLLOW-UP: Primary | ICD-10-CM

## 2019-02-20 PROCEDURE — G8427 DOCREV CUR MEDS BY ELIG CLIN: HCPCS | Performed by: FAMILY MEDICINE

## 2019-02-20 PROCEDURE — G8482 FLU IMMUNIZE ORDER/ADMIN: HCPCS | Performed by: FAMILY MEDICINE

## 2019-02-20 PROCEDURE — 99214 OFFICE O/P EST MOD 30 MIN: CPT | Performed by: FAMILY MEDICINE

## 2019-02-20 PROCEDURE — G8417 CALC BMI ABV UP PARAM F/U: HCPCS | Performed by: FAMILY MEDICINE

## 2019-02-20 PROCEDURE — 4040F PNEUMOC VAC/ADMIN/RCVD: CPT | Performed by: FAMILY MEDICINE

## 2019-02-20 PROCEDURE — 1090F PRES/ABSN URINE INCON ASSESS: CPT | Performed by: FAMILY MEDICINE

## 2019-02-20 PROCEDURE — 1101F PT FALLS ASSESS-DOCD LE1/YR: CPT | Performed by: FAMILY MEDICINE

## 2019-02-20 PROCEDURE — 1123F ACP DISCUSS/DSCN MKR DOCD: CPT | Performed by: FAMILY MEDICINE

## 2019-02-20 PROCEDURE — G8598 ASA/ANTIPLAT THER USED: HCPCS | Performed by: FAMILY MEDICINE

## 2019-02-20 PROCEDURE — 1036F TOBACCO NON-USER: CPT | Performed by: FAMILY MEDICINE

## 2019-02-20 ASSESSMENT — PATIENT HEALTH QUESTIONNAIRE - PHQ9
2. FEELING DOWN, DEPRESSED OR HOPELESS: 0
SUM OF ALL RESPONSES TO PHQ QUESTIONS 1-9: 0
SUM OF ALL RESPONSES TO PHQ QUESTIONS 1-9: 0
SUM OF ALL RESPONSES TO PHQ9 QUESTIONS 1 & 2: 0
1. LITTLE INTEREST OR PLEASURE IN DOING THINGS: 0

## 2019-02-20 ASSESSMENT — ENCOUNTER SYMPTOMS
SORE THROAT: 0
ABDOMINAL DISTENTION: 1
SHORTNESS OF BREATH: 0
ABDOMINAL PAIN: 0
RHINORRHEA: 0
COUGH: 0
BACK PAIN: 1
DIARRHEA: 0
CONSTIPATION: 0
WHEEZING: 0

## 2019-03-20 RX ORDER — LANCETS 28 GAUGE
EACH MISCELLANEOUS
Qty: 100 EACH | Refills: 2 | Status: SHIPPED | OUTPATIENT
Start: 2019-03-20 | End: 2019-06-10 | Stop reason: SDUPTHER

## 2019-04-03 RX ORDER — BLOOD-GLUCOSE METER
KIT MISCELLANEOUS
Qty: 100 STRIP | Refills: 6 | Status: SHIPPED | OUTPATIENT
Start: 2019-04-03 | End: 2019-10-09 | Stop reason: SDUPTHER

## 2019-04-12 RX ORDER — SIMVASTATIN 40 MG
TABLET ORAL
Qty: 30 TABLET | Refills: 11 | Status: SHIPPED | OUTPATIENT
Start: 2019-04-12 | End: 2020-04-09

## 2019-04-12 NOTE — TELEPHONE ENCOUNTER
Rx requested:  Requested Prescriptions     Pending Prescriptions Disp Refills    simvastatin (ZOCOR) 40 MG tablet [Pharmacy Med Name: SIMVASTATIN 40 MG TABLET] 30 tablet 11     Sig: TAKE 1 TABLET BY MOUTH nightly       Last Office Visit:   2/20/2019    Last Labs:  3/15/2018    Last filled:  3/12/2019  Next Visit Date:  Future Appointments   Date Time Provider Ed Fried   4/15/2019  9:00 AM SCHEDULE, [address]   4/18/2019 10:15 AM Radu Bowens MD [facility]

## 2019-04-15 ENCOUNTER — HOSPITAL ENCOUNTER (OUTPATIENT)
Age: 84
Setting detail: SPECIMEN
Discharge: HOME | End: 2019-04-15
Payer: MEDICARE

## 2019-04-15 ENCOUNTER — NURSE ONLY (OUTPATIENT)
Dept: FAMILY MEDICINE CLINIC | Age: 84
End: 2019-04-15
Payer: MEDICARE

## 2019-04-15 DIAGNOSIS — E11.65 UNCONTROLLED TYPE 2 DIABETES MELLITUS WITH HYPERGLYCEMIA (HCC): ICD-10-CM

## 2019-04-15 DIAGNOSIS — Z13.220 LIPID SCREENING: ICD-10-CM

## 2019-04-15 DIAGNOSIS — Z13.220 LIPID SCREENING: Primary | ICD-10-CM

## 2019-04-15 DIAGNOSIS — E03.9 HYPOTHYROIDISM, UNSPECIFIED TYPE: ICD-10-CM

## 2019-04-15 LAB
ANION GAP SERPL CALCULATED.3IONS-SCNC: 14 MEQ/L (ref 9–15)
BUN BLDV-MCNC: 23 MG/DL (ref 8–23)
CALCIUM SERPL-MCNC: 9 MG/DL (ref 8.5–9.9)
CHLORIDE BLD-SCNC: 106 MEQ/L (ref 95–107)
CHOLESTEROL, FASTING: 146 MG/DL (ref 0–199)
CO2: 24 MEQ/L (ref 20–31)
CREAT SERPL-MCNC: 0.99 MG/DL (ref 0.5–0.9)
GFR AFRICAN AMERICAN: >60
GFR NON-AFRICAN AMERICAN: 51.9
GLUCOSE BLD-MCNC: 106 MG/DL (ref 70–99)
HBA1C MFR BLD: 7.4 % (ref 4.8–5.9)
HDLC SERPL-MCNC: 67 MG/DL (ref 40–59)
LDL CHOLESTEROL CALCULATED: 64 MG/DL (ref 0–129)
POTASSIUM SERPL-SCNC: 4.5 MEQ/L (ref 3.4–4.9)
SODIUM BLD-SCNC: 144 MEQ/L (ref 135–144)
T4 FREE: 1.4 NG/DL (ref 0.84–1.68)
TRIGLYCERIDE, FASTING: 73 MG/DL (ref 0–150)
TSH SERPL DL<=0.05 MIU/L-ACNC: 5.53 UIU/ML (ref 0.44–3.86)

## 2019-04-15 PROCEDURE — 80061 LIPID PANEL: CPT

## 2019-04-15 PROCEDURE — 83036 HEMOGLOBIN GLYCOSYLATED A1C: CPT

## 2019-04-15 PROCEDURE — 80048 BASIC METABOLIC PNL TOTAL CA: CPT

## 2019-04-15 PROCEDURE — 84439 ASSAY OF FREE THYROXINE: CPT

## 2019-04-15 PROCEDURE — 84443 ASSAY THYROID STIM HORMONE: CPT

## 2019-04-15 PROCEDURE — 36415 COLL VENOUS BLD VENIPUNCTURE: CPT | Performed by: FAMILY MEDICINE

## 2019-04-15 NOTE — PROGRESS NOTES
Patient here today for blood work ordered by Dr. Stoney Muniz and fasting labs for Dr. Romero Aguirre.

## 2019-04-18 ENCOUNTER — OFFICE VISIT (OUTPATIENT)
Dept: ENDOCRINOLOGY | Age: 84
End: 2019-04-18
Payer: MEDICARE

## 2019-04-18 VITALS
WEIGHT: 107 LBS | BODY MASS INDEX: 24.07 KG/M2 | SYSTOLIC BLOOD PRESSURE: 154 MMHG | HEART RATE: 61 BPM | HEIGHT: 56 IN | DIASTOLIC BLOOD PRESSURE: 69 MMHG

## 2019-04-18 DIAGNOSIS — E11.65 UNCONTROLLED TYPE 2 DIABETES MELLITUS WITH HYPERGLYCEMIA (HCC): Primary | ICD-10-CM

## 2019-04-18 DIAGNOSIS — E03.9 HYPOTHYROIDISM, UNSPECIFIED TYPE: ICD-10-CM

## 2019-04-18 LAB
CHP ED QC CHECK: NORMAL
GLUCOSE BLD-MCNC: 234 MG/DL

## 2019-04-18 PROCEDURE — G8420 CALC BMI NORM PARAMETERS: HCPCS | Performed by: INTERNAL MEDICINE

## 2019-04-18 PROCEDURE — 4040F PNEUMOC VAC/ADMIN/RCVD: CPT | Performed by: INTERNAL MEDICINE

## 2019-04-18 PROCEDURE — 1123F ACP DISCUSS/DSCN MKR DOCD: CPT | Performed by: INTERNAL MEDICINE

## 2019-04-18 PROCEDURE — 82962 GLUCOSE BLOOD TEST: CPT | Performed by: INTERNAL MEDICINE

## 2019-04-18 PROCEDURE — 99213 OFFICE O/P EST LOW 20 MIN: CPT | Performed by: INTERNAL MEDICINE

## 2019-04-18 PROCEDURE — G8427 DOCREV CUR MEDS BY ELIG CLIN: HCPCS | Performed by: INTERNAL MEDICINE

## 2019-04-18 PROCEDURE — 1036F TOBACCO NON-USER: CPT | Performed by: INTERNAL MEDICINE

## 2019-04-18 PROCEDURE — 1090F PRES/ABSN URINE INCON ASSESS: CPT | Performed by: INTERNAL MEDICINE

## 2019-04-18 PROCEDURE — G8598 ASA/ANTIPLAT THER USED: HCPCS | Performed by: INTERNAL MEDICINE

## 2019-04-18 NOTE — PROGRESS NOTES
Subjective:      Patient ID: Pineda Somers is a 80 y.o. female. 6 MONTH F/U ON DIABETES  Hypothyroidism   Diabetes   She presents for her follow-up diabetic visit. She has type 2 diabetes mellitus. Diabetic complications include heart disease and nephropathy. Current diabetic treatment includes insulin injections (humalog plus lantus). Frequency home blood tests: 3 times a day  Her overall blood glucose range is 140-180 mg/dl. (Lab Results       Component                Value               Date                       LABA1C                   7.4 (H)             04/15/2019            ) An ACE inhibitor/angiotensin II receptor blocker is being taken. Hypothyroidism on replacement with synthroid 75 mcg daily reviewed labs  Lab Results   Component Value Date    TSH 5.530 (H) 04/15/2019    H3DMPTH 0.81 11/24/2014    T4FREE 1.40 04/15/2019               Patient Active Problem List   Diagnosis    CAD (coronary artery disease)    Type 2 diabetes mellitus with diabetic polyneuropathy (HCC)    Hyperlipidemia    GERD (gastroesophageal reflux disease)    Anemia    Osteopenia    Chronic back pain    Peripheral neuropathy    Osteoarthritis    Hypothyroidism    Inflammation of eyelid    Lateral cystocele    Fall down stairs    Fuchs' corneal dystrophy    History of artificial lens replacement    Mixed incontinence    Prolapse of vaginal wall    Unsteady gait    Vitreous degeneration    Systolic hypertension     Allergies   Allergen Reactions    Codeine Hives     Listed as getting hives from codeine but has repeatedly tolerated Vicodan and percocet without rash or allergy 2/16/12 HES    Glucophage [Metformin Hydrochloride] Diarrhea    Lisinopril Other (See Comments)     Makes pt cough    Lyrica [Pregabalin] Other (See Comments)     Sleep deprivation     Other      Other reaction(s):  Intolerance  Naprolen    Percocet [Oxycodone-Acetaminophen] Swelling     ankles    Procardia [Nifedipine]     Ranitidine Hcl     Tagamet [Cimetidine] Diarrhea    Welchol [Colesevelam Hcl] Diarrhea    Sulfamethopyrazine Nausea And Vomiting       Current Outpatient Medications:     simvastatin (ZOCOR) 40 MG tablet, TAKE 1 TABLET BY MOUTH nightly, Disp: 30 tablet, Rfl: 11    FREESTYLE LITE strip, 1 each by In Vitro route 3 times daily, Disp: 100 strip, Rfl: 6    [employer] UNILET LANCETS 28G MISC, use to test blood sugar THREE TIMES DAILY, Disp: 100 each, Rfl: 2    LANTUS SOLOSTAR 100 UNIT/ML injection pen, Inject 6 units into the skin nightly, Disp: 15 mL, Rfl: 3    [employer]ART UNIFINE PENTIPS 31G X 6 MM MISC, use 3 TO 4 times daily AS DIRECTED, Disp: 150 each, Rfl: 3    sodium chloride (LUIS FERNANDO 128) 5 % ophthalmic solution, Use 1 Drop in both eyes four times daily. , Disp: , Rfl:     HUMALOG KWIKPEN 100 UNIT/ML pen, inject 4 units in the am & lunch if glucose more than 160, Disp: 1 pen, Rfl: 3    isosorbide mononitrate (IMDUR) 30 MG extended release tablet, TAKE 1 TABLET BY MOUTH DAILY, Disp: 30 tablet, Rfl: 11    hydrochlorothiazide (HYDRODIURIL) 25 MG tablet, TAKE 1 TABLET DAILY. , Disp: , Rfl: 3    lisinopril (PRINIVIL;ZESTRIL) 5 MG tablet, bid, Disp: , Rfl: 3    amLODIPine (NORVASC) 5 MG tablet, Take 1 tablet by mouth 2 times daily, Disp: 60 tablet, Rfl: 3    levothyroxine (SYNTHROID) 75 MCG tablet, TAKE 1 TABLET EVERY DAY, Disp: 30 tablet, Rfl: 11    Fexofenadine HCl (ALLEGRA ALLERGY PO), Take by mouth, Disp: , Rfl:     dicyclomine (BENTYL) 10 MG capsule, Take 1 capsule by mouth every 6 hours as needed (cramps), Disp: 20 capsule, Rfl: 0    insulin glargine (LANTUS SOLOSTAR) 100 UNIT/ML injection pen, Inject 6 units into the skin nightly, Disp: 5 Pen, Rfl: 3    DOCQLACE 100 MG capsule, Take 1 capsule by mouth 2 times daily, Disp: 60 capsule, Rfl: 3    Handicap Placard MISC, by Does not apply route Dx Gait Abnormality  Expires: 2019, Disp: 1 each, Rfl: 0    lidocaine (XYLOCAINE) 5 % ointment, Apply topically as needed. , Disp: 50 g, Rfl: 1    fluticasone (FLONASE) 50 MCG/ACT nasal spray, 1 spray by Nasal route daily, Disp: 1 Bottle, Rfl: 3    Loratadine 10 MG CAPS, Take 10 mg by mouth daily , Disp: , Rfl:     Potassium 99 MG TABS, Take  by mouth daily. , Disp: , Rfl:     traMADol (ULTRAM) 50 MG tablet, Take 1 tablet by mouth every 6 hours as needed. , Disp: 90 tablet, Rfl: 0    Multiple Vitamins-Minerals (MULTIVITAL PO), Take 1 tablet by mouth daily. , Disp: , Rfl:     omeprazole (PRILOSEC) 20 MG capsule, Take 20 mg by mouth daily. , Disp: , Rfl:     nitroGLYCERIN (NITROQUICK) 0.4 MG SL tablet, Place 0.4 mg under the tongue every 5 minutes as needed. , Disp: , Rfl:     Omega-3 Fatty Acids (FISH OIL) 1200 MG CAPS, Take  by mouth 2 times daily. , Disp: , Rfl:     aspirin 81 MG tablet, Take 81 mg by mouth daily. , Disp: , Rfl:     Review of Systems   All other systems reviewed and are negative. Vitals:    04/18/19 1036   BP: (!) 154/69   Pulse: 61   Weight: 107 lb (48.5 kg)   Height: 4' 7.5\" (1.41 m)       Objective:   Physical Exam   Constitutional: She appears well-developed and well-nourished. Eyes: Conjunctivae are normal.   Neck: Neck supple. Cardiovascular: Normal rate. Pulmonary/Chest: Effort normal.   Musculoskeletal: Normal range of motion. Neurological: She is alert. Psychiatric: Her mood appears anxious. Assessment:       Diagnosis Orders   1. Uncontrolled type 2 diabetes mellitus with hyperglycemia (HCC)  Basic Metabolic Panel    Hemoglobin A1C    Microalbumin / Creatinine Urine Ratio    POCT Glucose   2.  Hypothyroidism, unspecified type             Plan:      Orders Placed This Encounter   Procedures    POCT Glucose       Orders Placed This Encounter   Procedures    Basic Metabolic Panel     Standing Status:   Future     Standing Expiration Date:   4/18/2020    Hemoglobin A1C     Standing Status:   Future     Standing Expiration Date:   4/18/2020    Microalbumin / Creatinine Urine Ratio     Standing Status:   Future     Standing Expiration Date:   4/18/2020    T4, Free     Standing Status:   Future     Standing Expiration Date:   4/18/2020    TSH without Reflex     Standing Status:   Future     Standing Expiration Date:   4/18/2020    POCT Glucose       Continue lantus 6 units hs plus  Humalog     Orders Placed This Encounter   Medications    insulin lispro (HUMALOG KWIKPEN) 100 UNIT/ML pen     Sig: inject 4 units in the am & lunch if glucose more than 160     Dispense:  1 pen     Refill:  3       Continue synthroid 75 mcg daily   F/u in 6 months         River Sosa MD

## 2019-06-10 ENCOUNTER — APPOINTMENT (OUTPATIENT)
Dept: GENERAL RADIOLOGY | Age: 84
End: 2019-06-10
Payer: MEDICARE

## 2019-06-10 ENCOUNTER — HOSPITAL ENCOUNTER (EMERGENCY)
Age: 84
Discharge: HOME | End: 2019-06-10
Attending: EMERGENCY MEDICINE
Payer: MEDICARE

## 2019-06-10 VITALS
WEIGHT: 107 LBS | HEART RATE: 53 BPM | HEIGHT: 56 IN | OXYGEN SATURATION: 97 % | RESPIRATION RATE: 18 BRPM | BODY MASS INDEX: 24.07 KG/M2 | TEMPERATURE: 98.1 F | DIASTOLIC BLOOD PRESSURE: 79 MMHG | SYSTOLIC BLOOD PRESSURE: 197 MMHG

## 2019-06-10 DIAGNOSIS — G89.29 ACUTE EXACERBATION OF CHRONIC LOW BACK PAIN: ICD-10-CM

## 2019-06-10 DIAGNOSIS — S33.5XXA LUMBAR SPRAIN, INITIAL ENCOUNTER: ICD-10-CM
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
## 2019-06-10 DIAGNOSIS — M54.50 ACUTE EXACERBATION OF CHRONIC LOW BACK PAIN: ICD-10-CM

## 2019-06-10 DIAGNOSIS — S40.022A CONTUSION OF MULTIPLE SITES OF LEFT SHOULDER AND UPPER ARM, INITIAL ENCOUNTER: Primary | ICD-10-CM

## 2019-06-10 DIAGNOSIS — S40.012A CONTUSION OF MULTIPLE SITES OF LEFT SHOULDER AND UPPER ARM, INITIAL ENCOUNTER: Primary | ICD-10-CM

## 2019-06-10 PROCEDURE — 6370000000 HC RX 637 (ALT 250 FOR IP): Performed by: EMERGENCY MEDICINE

## 2019-06-10 PROCEDURE — 72100 X-RAY EXAM L-S SPINE 2/3 VWS: CPT

## 2019-06-10 PROCEDURE — 6360000002 HC RX W HCPCS: Performed by: EMERGENCY MEDICINE

## 2019-06-10 PROCEDURE — 72072 X-RAY EXAM THORAC SPINE 3VWS: CPT

## 2019-06-10 PROCEDURE — 73030 X-RAY EXAM OF SHOULDER: CPT

## 2019-06-10 PROCEDURE — 99283 EMERGENCY DEPT VISIT LOW MDM: CPT

## 2019-06-10 PROCEDURE — 96372 THER/PROPH/DIAG INJ SC/IM: CPT

## 2019-06-10 RX ORDER — KETOROLAC TROMETHAMINE 10 MG/1
10 TABLET, FILM COATED ORAL EVERY 6 HOURS PRN
Qty: 20 TABLET | Refills: 0 | Status: SHIPPED | OUTPATIENT
Start: 2019-06-10 | End: 2020-06-04 | Stop reason: ALTCHOICE

## 2019-06-10 RX ORDER — KETOROLAC TROMETHAMINE 15 MG/ML
15 INJECTION, SOLUTION INTRAMUSCULAR; INTRAVENOUS ONCE
Status: COMPLETED | OUTPATIENT
Start: 2019-06-10 | End: 2019-06-10

## 2019-06-10 RX ORDER — TRAMADOL HYDROCHLORIDE 50 MG/1
50 TABLET ORAL ONCE
Status: COMPLETED | OUTPATIENT
Start: 2019-06-10 | End: 2019-06-10

## 2019-06-10 RX ORDER — ONDANSETRON 4 MG/1
4 TABLET, FILM COATED ORAL EVERY 8 HOURS PRN
Qty: 20 TABLET | Refills: 0 | Status: SHIPPED | OUTPATIENT
Start: 2019-06-10 | End: 2020-06-04 | Stop reason: ALTCHOICE

## 2019-06-10 RX ORDER — ONDANSETRON 4 MG/1
4 TABLET, ORALLY DISINTEGRATING ORAL ONCE
Status: COMPLETED | OUTPATIENT
Start: 2019-06-10 | End: 2019-06-10

## 2019-06-10 RX ORDER — LEVOTHYROXINE SODIUM 0.07 MG/1
TABLET ORAL
Qty: 30 TABLET | Refills: 11 | OUTPATIENT
Start: 2019-06-10

## 2019-06-10 RX ORDER — PEN NEEDLE, DIABETIC 31 G X1/4"
NEEDLE, DISPOSABLE MISCELLANEOUS
Qty: 150 EACH | Refills: 3 | Status: SHIPPED | OUTPATIENT
Start: 2019-06-10 | End: 2020-01-10

## 2019-06-10 RX ORDER — LANCETS 28 GAUGE
EACH MISCELLANEOUS
Qty: 100 EACH | Refills: 2 | Status: SHIPPED | OUTPATIENT
Start: 2019-06-10 | End: 2019-10-09 | Stop reason: SDUPTHER

## 2019-06-10 RX ADMIN — TRAMADOL HYDROCHLORIDE 50 MG: 50 TABLET, COATED ORAL at 06:59

## 2019-06-10 RX ADMIN — ONDANSETRON 4 MG: 4 TABLET, ORALLY DISINTEGRATING ORAL at 06:59

## 2019-06-10 RX ADMIN — KETOROLAC TROMETHAMINE 15 MG: 15 INJECTION, SOLUTION INTRAMUSCULAR; INTRAVENOUS at 05:27

## 2019-06-10 ASSESSMENT — PAIN DESCRIPTION - ONSET
ONSET: ON-GOING
ONSET: SUDDEN

## 2019-06-10 ASSESSMENT — ENCOUNTER SYMPTOMS
ABDOMINAL PAIN: 0
SINUS PRESSURE: 0
DIARRHEA: 0
RHINORRHEA: 0
BACK PAIN: 1
APNEA: 0
ABDOMINAL DISTENTION: 0
SHORTNESS OF BREATH: 0
PHOTOPHOBIA: 0
NAUSEA: 0
EYE PAIN: 0
CONSTIPATION: 0
SORE THROAT: 0
VOMITING: 0
COUGH: 0
COLOR CHANGE: 0
WHEEZING: 0

## 2019-06-10 ASSESSMENT — PAIN DESCRIPTION - PROGRESSION
CLINICAL_PROGRESSION: NOT CHANGED
CLINICAL_PROGRESSION: NOT CHANGED

## 2019-06-10 ASSESSMENT — PAIN SCALES - GENERAL
PAINLEVEL_OUTOF10: 7

## 2019-06-10 ASSESSMENT — PAIN DESCRIPTION - LOCATION
LOCATION: BACK
LOCATION: BACK;SHOULDER

## 2019-06-10 ASSESSMENT — PAIN DESCRIPTION - ORIENTATION
ORIENTATION: RIGHT
ORIENTATION: LOWER;LEFT

## 2019-06-10 ASSESSMENT — PAIN DESCRIPTION - PAIN TYPE
TYPE: ACUTE PAIN
TYPE: ACUTE PAIN

## 2019-06-10 ASSESSMENT — PAIN DESCRIPTION - FREQUENCY: FREQUENCY: CONTINUOUS

## 2019-06-10 ASSESSMENT — PAIN DESCRIPTION - DESCRIPTORS
DESCRIPTORS: SHARP
DESCRIPTORS: SHARP

## 2019-06-10 NOTE — ED PROVIDER NOTES
[facility] ED  eMERGENCY dEPARTMENT eNCOUnter      Pt Name: Jerrod Camejo  MRN: [id number]  [place] 7/30/1922  Date of evaluation: 6/10/2019  Provider: Patrcia Shone, MD    [address] [place] [address]       Chief Complaint   Patient presents with    Fall     830 pm sunday    Back Pain         HISTORY OF PRESENT ILLNESS   (Location/Symptom, Timing/Onset,Context/Setting, Quality, Duration, Modifying Factors, Severity)  Note limiting factors. Jerrod Camejo is a 80 y.o. female who presents to the emergency department with complaint of left shoulder pain lower back pain and right rib cage pain status post fall on Sunday 8:30 PM.  No loss of consciousness. She had been down to  something, stood back, felt dizzy and fell. No head injuries. Pain is 7 in a scale of 1-10 and sharp. Pain is worse with movement. Ice and elevation helps. HPI    Nursing Notes were reviewed. REVIEW OF SYSTEMS    (2-9 systems for level 4, 10 or more for level 5)     Review of Systems   Constitutional: Negative. Negative for activity change, appetite change, chills, fatigue and fever. HENT: Negative for congestion, ear discharge, ear pain, hearing loss, rhinorrhea, sinus pressure and sore throat. Eyes: Negative for photophobia, pain and visual disturbance. Respiratory: Negative for apnea, cough, shortness of breath and wheezing. Cardiovascular: Negative for chest pain, palpitations and leg swelling. Gastrointestinal: Negative for abdominal distention, abdominal pain, constipation, diarrhea, nausea and vomiting. Endocrine: Negative for cold intolerance, heat intolerance and polyuria. Genitourinary: Negative for dysuria, flank pain, frequency and urgency. Musculoskeletal: Positive for arthralgias, back pain, gait problem, joint swelling and myalgias. Negative for neck stiffness. Skin: Negative for color change, pallor and rash.    Allergic/Immunologic: Negative for food allergies and immunocompromised state.   Neurological: Negative for dizziness, tremors, syncope, weakness, light-headedness and headaches. Psychiatric/Behavioral: Negative for agitation, confusion and hallucinations. All other systems reviewed and are negative. Except as noted above the remainder of the review of systems was reviewed and negative. PAST MEDICAL HISTORY     Past Medical History:   Diagnosis Date    Anemia     Arthritis     Bleeding from breast 2003    R breast treated by dr Tracy Carballo CAD (coronary artery disease)     Cancer ([facility] [address]     melanoma and carcinoma    Carpal tunnel syndrome     Chronic back pain     Dupuytren's contracture of hand 2009    Left hand treated by dr Sterling Hillman GERD (gastroesophageal reflux disease)     History of mammogram 7/2011    WNL    Hyperlipidemia     Hypertension     Obesity     Osteopenia     hips    Peripheral neuropathy     Presbyesophagus 2009    esophogram 3/19/2009    Shingles [id number]    Shingles     Type II or unspecified type diabetes mellitus without mention of complication, not stated as uncontrolled          SURGICAL HISTORY       Past Surgical History:   Procedure Laterality Date    APPENDECTOMY      CARDIAC CATHETERIZATION  04/06/2004    CARDIAC CATHETERIZATION  10/01/2002    CHOLECYSTECTOMY      COLONOSCOPY  2003    rectal hemangiomas, and colon polyps    COLONOSCOPY  04/29/2010    CYSTOSCOPY  2010    LARYNGOSCOPY  2008   Eppie Tez MALIGNANT SKIN LESION EXCISION  2001   Eppyuliana Tez OTHER SURGICAL HISTORY  2012    right arm, several sutures applied from injury    PTCA  2002 no stents, 2005 two stents, 2004 two stents    UPPER GASTROINTESTINAL ENDOSCOPY  2003         CURRENT MEDICATIONS       Previous Medications    AMLODIPINE (NORVASC) 5 MG TABLET    Take 1 tablet by mouth 2 times daily    ASPIRIN 81 MG TABLET    Take 81 mg by mouth daily.       DICYCLOMINE (BENTYL) 10 MG CAPSULE    Take 1 capsule by mouth every 6 hours as needed (cramps)    DOCQLACE 100 MG CAPSULE Take 1 capsule by mouth 2 times daily    [employer] UNIFINE PENTIPS 31G X 6 MM MISC    use 3 TO 4 times daily AS DIRECTED    [employer] UNILET LANCETS 28G MISC    use to test blood sugar THREE TIMES DAILY    FEXOFENADINE HCL (ALLEGRA ALLERGY PO)    Take by mouth    FLUTICASONE (FLONASE) 50 MCG/ACT NASAL SPRAY    1 spray by Nasal route daily    FREESTYLE LITE STRIP    1 each by In Vitro route 3 times daily    HANDICAP PLACARD MISC    by Does not apply route Dx Gait Abnormality  Expires: 2019    HYDROCHLOROTHIAZIDE (HYDRODIURIL) 25 MG TABLET    TAKE 1 TABLET DAILY. INSULIN GLARGINE (LANTUS SOLOSTAR) 100 UNIT/ML INJECTION PEN    Inject 6 units into the skin nightly    INSULIN LISPRO (HUMALOG KWIKPEN) 100 UNIT/ML PEN    inject 4 units in the am & lunch if glucose more than 160    ISOSORBIDE MONONITRATE (IMDUR) 30 MG EXTENDED RELEASE TABLET    TAKE 1 TABLET BY MOUTH DAILY    LANTUS SOLOSTAR 100 UNIT/ML INJECTION PEN    Inject 6 units into the skin nightly    LEVOTHYROXINE (SYNTHROID) 75 MCG TABLET    TAKE 1 TABLET EVERY DAY    LIDOCAINE (XYLOCAINE) 5 % OINTMENT    Apply topically as needed. LISINOPRIL (PRINIVIL;ZESTRIL) 5 MG TABLET    bid    LORATADINE 10 MG CAPS    Take 10 mg by mouth daily     MULTIPLE VITAMINS-MINERALS (MULTIVITAL PO)    Take 1 tablet by mouth daily. NITROGLYCERIN (NITROQUICK) 0.4 MG SL TABLET    Place 0.4 mg under the tongue every 5 minutes as needed. OMEGA-3 FATTY ACIDS (FISH OIL) 1200 MG CAPS    Take  by mouth 2 times daily. OMEPRAZOLE (PRILOSEC) 20 MG CAPSULE    Take 20 mg by mouth daily. POTASSIUM 99 MG TABS    Take  by mouth daily. SIMVASTATIN (ZOCOR) 40 MG TABLET    TAKE 1 TABLET BY MOUTH nightly    SODIUM CHLORIDE (LUIS FERNANDO 128) 5 % OPHTHALMIC SOLUTION    Use 1 Drop in both eyes four times daily. TRAMADOL (ULTRAM) 50 MG TABLET    Take 1 tablet by mouth every 6 hours as needed. ALLERGIES     Codeine; Glucophage [metformin hydrochloride];  Lisinopril; Lyrica [pregabalin]; Other; Percocet [oxycodone-acetaminophen]; Procardia [nifedipine]; Ranitidine hcl; Tagamet [cimetidine]; Welchol [colesevelam hcl]; and Sulfamethopyrazine    FAMILY HISTORY       Family History   Problem Relation Age of Onset    Ovarian Cancer Sister     Heart Attack Sister           SOCIAL HISTORY       Social History     Socioeconomic History    Marital status:      Spouse name: None    Number of children: None    Years of education: None    Highest education level: None   Occupational History    None   Social Needs    Financial resource strain: None    Food insecurity:     Worry: None     Inability: None    Transportation needs:     Medical: None     Non-medical: None   Tobacco Use    Smoking status: Never Smoker    Smokeless tobacco: Never Used   Substance and Sexual Activity    Alcohol use: No    Drug use: No    Sexual activity: Never   Lifestyle    Physical activity:     Days per week: None     Minutes per session: None    Stress: None   Relationships    Social connections:     Talks on phone: None     Gets together: None     Attends [employer] service: None     Active member of club or organization: None     Attends meetings of clubs or organizations: None     Relationship status: None    Intimate partner violence:     Fear of current or ex partner: None     Emotionally abused: None     Physically abused: None     Forced sexual activity: None   Other Topics Concern    None   Social History Narrative    None       SCREENINGS             PHYSICAL EXAM    (up to 7 for level 4, 8 or more for level 5)     ED Triage Vitals [06/10/19 0455]   BP Temp Temp Source Pulse Resp SpO2 Height Weight   (!) 197/79 98.1 °F (36.7 °C) Oral 53 18 97 % 4' 8\" (1.422 m) 107 lb (48.5 kg)       Physical Exam   Constitutional: She is oriented to person, place, and time. She appears well-developed and well-nourished. She appears distressed. HENT:   Head: Normocephalic and atraumatic.    Nose: if available at the time of this note:    XR SHOULDER LEFT (MIN 2 VIEWS)    (Results Pending)   XR LUMBAR SPINE (2-3 VIEWS)    (Results Pending)   XR THORACIC SPINE (3 VIEWS)    (Results Pending)         ED BEDSIDE ULTRASOUND:   Performed by ED Physician - none    LABS:  Labs Reviewed - No data to display    All other labs were within normal range or not returned as of this dictation. EMERGENCY DEPARTMENT COURSE and DIFFERENTIALDIAGNOSIS/MDM:   Vitals:    Vitals:    06/10/19 0455   BP: (!) 197/79   Pulse: 53   Resp: 18   Temp: 98.1 °F (36.7 °C)   TempSrc: Oral   SpO2: 97%   Weight: 107 lb (48.5 kg)   Height: 4' 8\" (1.422 m)       MDM  Number of Diagnoses or Management Options     Amount and/or Complexity of Data Reviewed  Tests in the radiology section of CPT®: reviewed and ordered    Risk of Complications, Morbidity, and/or Mortality  Presenting problems: moderate  Diagnostic procedures: moderate  Management options: moderate    Patient Progress  Patient progress: improved      CRITICAL CARE TIME   Total Critical Care time was  minutes, excluding separately reportable procedures. There was a high probability of clinically significant/life threatening deterioration in the patient's condition which required my urgentintervention. CONSULTS:  None    PROCEDURES:  Unless otherwise noted below, none     Procedures    FINAL IMPRESSION      1. Contusion of multiple sites of left shoulder and upper arm, initial encounter    2. Lumbar sprain, initial encounter    3. Acute exacerbation of chronic low back pain    4.  Fall, initial encounter          DISPOSITION/PLAN   DISPOSITION Discharge - Pending Orders Complete 06/10/2019 06:24:31 AM      PATIENT REFERRED TO:  Lia Singleton MD  [address] [facility] [place] [phone]    In 3 days        DISCHARGE MEDICATIONS:  New Prescriptions    KETOROLAC (TORADOL) 10 MG TABLET    Take 1 tablet by mouth every 6 hours as needed for Pain    ONDANSETRON (ZOFRAN) 4 MG TABLET    Take 1 tablet by mouth every 8 hours as needed for Nausea          (Please note that portions of this note were completed with a voice recognitionprogram.  Efforts were made to edit the dictations but occasionally words are mis-transcribed.)    Adam Saldana MD (electronically signed)  Attending Emergency Physician          Adam Saldana MD  06/10/19 9585

## 2019-06-10 NOTE — TELEPHONE ENCOUNTER
Rx requested:  Requested Prescriptions     Pending Prescriptions Disp Refills    levothyroxine (SYNTHROID) 75 MCG tablet [Pharmacy Med Name: LEVOTHYROXINE SODIUM 75 MCG TABLET] 30 tablet 11     Sig: TAKE 1 TABLET EVERY DAY       Last Office Visit:   2/20/2019    Last Labs:  4/15/19    Last filled:  6/15/18    Next Visit Date:  Future Appointments   Date Time Provider Ed Fried   10/18/2019 10:30 AM Julieth Cole MD [facility]

## 2019-06-25 ENCOUNTER — TELEPHONE (OUTPATIENT)
Dept: FAMILY MEDICINE CLINIC | Age: 84
End: 2019-06-25

## 2019-07-03 RX ORDER — LEVOTHYROXINE SODIUM 0.07 MG/1
TABLET ORAL
Qty: 30 TABLET | Refills: 11 | Status: SHIPPED | OUTPATIENT
Start: 2019-07-03 | End: 2020-06-26 | Stop reason: SDUPTHER

## 2019-08-16 RX ORDER — ISOSORBIDE MONONITRATE 30 MG/1
TABLET, EXTENDED RELEASE ORAL
Qty: 30 TABLET | Refills: 11 | Status: SHIPPED | OUTPATIENT
Start: 2019-08-16 | End: 2020-08-12

## 2019-09-23 ENCOUNTER — HOSPITAL ENCOUNTER (EMERGENCY)
Age: 84
Discharge: HOME | End: 2019-09-23
Attending: EMERGENCY MEDICINE
Payer: MEDICARE

## 2019-09-23 ENCOUNTER — NURSE TRIAGE (OUTPATIENT)
Dept: OTHER | Facility: CLINIC | Age: 84
End: 2019-09-23

## 2019-09-23 VITALS
OXYGEN SATURATION: 97 % | HEART RATE: 62 BPM | WEIGHT: 109 LBS | BODY MASS INDEX: 25.22 KG/M2 | TEMPERATURE: 98 F | RESPIRATION RATE: 16 BRPM | DIASTOLIC BLOOD PRESSURE: 74 MMHG | HEIGHT: 55 IN | SYSTOLIC BLOOD PRESSURE: 181 MMHG

## 2019-09-23 DIAGNOSIS — R60.9 PERIPHERAL EDEMA: Primary | ICD-10-CM

## 2019-09-23 LAB
ALBUMIN SERPL-MCNC: 4.1 G/DL (ref 3.5–4.6)
ALP BLD-CCNC: 47 U/L (ref 40–130)
ALT SERPL-CCNC: 8 U/L (ref 0–33)
ANION GAP SERPL CALCULATED.3IONS-SCNC: 14 MEQ/L (ref 9–15)
AST SERPL-CCNC: 15 U/L (ref 0–35)
BACTERIA: NORMAL /HPF
BASOPHILS ABSOLUTE: 0 K/UL (ref 0–0.2)
BASOPHILS RELATIVE PERCENT: 0.5 %
BILIRUB SERPL-MCNC: 0.4 MG/DL (ref 0.2–0.7)
BILIRUBIN URINE: NEGATIVE
BLOOD, URINE: NEGATIVE
BUN BLDV-MCNC: 22 MG/DL (ref 8–23)
CALCIUM SERPL-MCNC: 8.6 MG/DL (ref 8.5–9.9)
CHLORIDE BLD-SCNC: 102 MEQ/L (ref 95–107)
CLARITY: CLEAR
CO2: 24 MEQ/L (ref 20–31)
COLOR: YELLOW
CREAT SERPL-MCNC: 0.94 MG/DL (ref 0.5–0.9)
EOSINOPHILS ABSOLUTE: 0.2 K/UL (ref 0–0.7)
EOSINOPHILS RELATIVE PERCENT: 2.2 %
EPITHELIAL CELLS, UA: NORMAL /HPF
GFR AFRICAN AMERICAN: >60
GFR NON-AFRICAN AMERICAN: 55.1
GLOBULIN: 2.8 G/DL (ref 2.3–3.5)
GLUCOSE BLD-MCNC: 235 MG/DL (ref 70–99)
GLUCOSE URINE: NEGATIVE MG/DL
HCT VFR BLD CALC: 30.4 % (ref 37–47)
HEMOGLOBIN: 9.7 G/DL (ref 12–16)
KETONES, URINE: NEGATIVE MG/DL
LEUKOCYTE ESTERASE, URINE: ABNORMAL
LYMPHOCYTES ABSOLUTE: 2.1 K/UL (ref 1–4.8)
LYMPHOCYTES RELATIVE PERCENT: 27.6 %
MCH RBC QN AUTO: 30.1 PG (ref 27–31.3)
MCHC RBC AUTO-ENTMCNC: 32 % (ref 33–37)
MCV RBC AUTO: 93.9 FL (ref 82–100)
MONOCYTES ABSOLUTE: 0.7 K/UL (ref 0.2–0.8)
MONOCYTES RELATIVE PERCENT: 9.3 %
NEUTROPHILS ABSOLUTE: 4.7 K/UL (ref 1.4–6.5)
NEUTROPHILS RELATIVE PERCENT: 60.4 %
NITRITE, URINE: NEGATIVE
PDW BLD-RTO: 14.1 % (ref 11.5–14.5)
PH UA: 6 (ref 5–9)
PLATELET # BLD: 142 K/UL (ref 130–400)
POTASSIUM SERPL-SCNC: 4.5 MEQ/L (ref 3.4–4.9)
PRO-BNP: 3379 PG/ML
PROTEIN UA: 30 MG/DL
RBC # BLD: 3.23 M/UL (ref 4.2–5.4)
RBC UA: NORMAL /HPF (ref 0–2)
SODIUM BLD-SCNC: 140 MEQ/L (ref 135–144)
SPECIFIC GRAVITY UA: 1.01 (ref 1–1.03)
TOTAL PROTEIN: 6.9 G/DL (ref 6.3–8)
TSH SERPL DL<=0.05 MIU/L-ACNC: 3.76 UIU/ML (ref 0.44–3.86)
URINE REFLEX TO CULTURE: YES
UROBILINOGEN, URINE: 0.2 E.U./DL
WBC # BLD: 7.7 K/UL (ref 4.8–10.8)
WBC UA: NORMAL /HPF (ref 0–5)

## 2019-09-23 PROCEDURE — 80053 COMPREHEN METABOLIC PANEL: CPT

## 2019-09-23 PROCEDURE — 6360000002 HC RX W HCPCS: Performed by: EMERGENCY MEDICINE

## 2019-09-23 PROCEDURE — 99283 EMERGENCY DEPT VISIT LOW MDM: CPT

## 2019-09-23 PROCEDURE — 84443 ASSAY THYROID STIM HORMONE: CPT

## 2019-09-23 PROCEDURE — 85025 COMPLETE CBC W/AUTO DIFF WBC: CPT

## 2019-09-23 PROCEDURE — 83880 ASSAY OF NATRIURETIC PEPTIDE: CPT

## 2019-09-23 PROCEDURE — 36415 COLL VENOUS BLD VENIPUNCTURE: CPT

## 2019-09-23 PROCEDURE — 96374 THER/PROPH/DIAG INJ IV PUSH: CPT

## 2019-09-23 PROCEDURE — 2580000003 HC RX 258: Performed by: EMERGENCY MEDICINE

## 2019-09-23 PROCEDURE — 81001 URINALYSIS AUTO W/SCOPE: CPT

## 2019-09-23 PROCEDURE — 87086 URINE CULTURE/COLONY COUNT: CPT

## 2019-09-23 RX ORDER — FUROSEMIDE 20 MG/1
20 TABLET ORAL DAILY
Qty: 10 TABLET | Refills: 0 | Status: SHIPPED | OUTPATIENT
Start: 2019-09-23 | End: 2020-02-10 | Stop reason: SDUPTHER

## 2019-09-23 RX ORDER — FUROSEMIDE 10 MG/ML
20 INJECTION INTRAMUSCULAR; INTRAVENOUS ONCE
Status: COMPLETED | OUTPATIENT
Start: 2019-09-23 | End: 2019-09-23

## 2019-09-23 RX ORDER — SODIUM CHLORIDE 0.9 % (FLUSH) 0.9 %
3 SYRINGE (ML) INJECTION EVERY 8 HOURS
Status: DISCONTINUED | OUTPATIENT
Start: 2019-09-23 | End: 2019-09-23 | Stop reason: HOSPADM

## 2019-09-23 RX ADMIN — Medication 3 ML: at 12:07

## 2019-09-23 RX ADMIN — FUROSEMIDE 20 MG: 10 INJECTION, SOLUTION INTRAMUSCULAR; INTRAVENOUS at 12:05

## 2019-09-23 ASSESSMENT — ENCOUNTER SYMPTOMS
EYE REDNESS: 0
BLOOD IN STOOL: 0
STRIDOR: 0
WHEEZING: 0
VOICE CHANGE: 0
FACIAL SWELLING: 0
VOMITING: 0
TROUBLE SWALLOWING: 0
CHEST TIGHTNESS: 0
ABDOMINAL PAIN: 0
CONSTIPATION: 0
EYE PAIN: 0
CHOKING: 0
DIARRHEA: 0
BACK PAIN: 0
COUGH: 0
SORE THROAT: 0
EYE DISCHARGE: 0
SHORTNESS OF BREATH: 0
SINUS PRESSURE: 0

## 2019-09-23 NOTE — ED PROVIDER NOTES
[facility] ED  eMERGENCY dEPARTMENT eNCOUnter      Pt Name: Johnny Ly  MRN: [id number]  [place] 7/30/1922  Date of evaluation: 9/23/2019  Provider: Laura Tinajero MD    [address] [place] [address]       Chief Complaint   Patient presents with    Other     Actively seeping wound on right lower leg         HISTORY OF PRESENT ILLNESS   (Location/Symptom, Timing/Onset,Context/Setting, Quality, Duration, Modifying Factors, Severity)  Note limiting factors. Johnny Ly is a 80 y.o. female who presents to the emergency department patient come to this emergency because of the puffiness of the both legs and does include off-and-on for the both sides denies any pain patient claimed that she bumped her lites and nighttime because of her restless leg syndrome as per daughter no chest pain no short of breath. Not taking any blood thinners history of coronary disease type 2 diabetes hyperlipidemia and GERD. HPI    NursingNotes were reviewed. REVIEW OF SYSTEMS    (2-9 systems for level 4, 10 or more for level 5)     Review of Systems   Constitutional: Negative. Negative for activity change and fever. HENT: Negative for congestion, drooling, facial swelling, mouth sores, nosebleeds, sinus pressure, sore throat, trouble swallowing and voice change. Eyes: Negative for pain, discharge, redness and visual disturbance. Respiratory: Negative for cough, choking, chest tightness, shortness of breath, wheezing and stridor. Cardiovascular: Positive for leg swelling. Negative for chest pain and palpitations. Gastrointestinal: Negative for abdominal pain, blood in stool, constipation, diarrhea and vomiting. Endocrine: Negative for cold intolerance, polyphagia and polyuria. Genitourinary: Negative for dysuria, flank pain, frequency, genital sores and urgency. Musculoskeletal: Negative for back pain, joint swelling, neck pain and neck stiffness. Skin: Negative for pallor and rash.    Neurological: MG CAPSULE    Take 1 capsule by mouth every 6 hours as needed (cramps)    DOCQLACE 100 MG CAPSULE    Take 1 capsule by mouth 2 times daily    [employer]UG MART UNIFINE PENTIPS 31G X 6 MM MISC    use 3 TO 4 times daily AS DIRECTED    [employer] MART UNILET LANCETS 28G MISC    USE AS DIRECTED to test blood sugar THREE TIMES DAILY    FEXOFENADINE HCL (ALLEGRA ALLERGY PO)    Take by mouth    FLUTICASONE (FLONASE) 50 MCG/ACT NASAL SPRAY    1 spray by Nasal route daily    FREESTYLE LITE STRIP    1 each by In Vitro route 3 times daily    HANDICAP PLACARD MISC    by Does not apply route Dx Gait Abnormality  Expires: 2019    HYDROCHLOROTHIAZIDE (HYDRODIURIL) 25 MG TABLET    TAKE 1 TABLET DAILY. INSULIN GLARGINE (LANTUS SOLOSTAR) 100 UNIT/ML INJECTION PEN    Inject 6 units into the skin nightly    INSULIN LISPRO (HUMALOG KWIKPEN) 100 UNIT/ML PEN    inject 4 units in the am & lunch if glucose more than 160    ISOSORBIDE MONONITRATE (IMDUR) 30 MG EXTENDED RELEASE TABLET    TAKE 1 TABLET BY MOUTH DAILY    KETOROLAC (TORADOL) 10 MG TABLET    Take 1 tablet by mouth every 6 hours as needed for Pain    LANTUS SOLOSTAR 100 UNIT/ML INJECTION PEN    Inject 6 units into the skin nightly    LEVOTHYROXINE (SYNTHROID) 75 MCG TABLET    TAKE 1 TABLET EVERY DAY    LIDOCAINE (XYLOCAINE) 5 % OINTMENT    Apply topically as needed. LISINOPRIL (PRINIVIL;ZESTRIL) 5 MG TABLET    bid    LORATADINE 10 MG CAPS    Take 10 mg by mouth daily     MULTIPLE VITAMINS-MINERALS (MULTIVITAL PO)    Take 1 tablet by mouth daily. NITROGLYCERIN (NITROQUICK) 0.4 MG SL TABLET    Place 0.4 mg under the tongue every 5 minutes as needed. OMEGA-3 FATTY ACIDS (FISH OIL) 1200 MG CAPS    Take  by mouth 2 times daily. OMEPRAZOLE (PRILOSEC) 20 MG CAPSULE    Take 20 mg by mouth daily. ONDANSETRON (ZOFRAN) 4 MG TABLET    Take 1 tablet by mouth every 8 hours as needed for Nausea    POTASSIUM 99 MG TABS    Take  by mouth daily.     SIMVASTATIN (ZOCOR) 40 MG TABLET @FLOW([id number])@      PHYSICAL EXAM    (up to 7 for level 4, 8 or more for level 5)     ED Triage Vitals [09/23/19 1054]   BP Temp Temp Source Pulse Resp SpO2 Height Weight   (!) 198/77 98 °F (36.7 °C) Oral 73 16 96 % 4' 7\" (1.397 m) 109 lb (49.4 kg)       Physical Exam   Constitutional: She is oriented to person, place, and time. She appears well-developed. HENT:   Head: Normocephalic. Right Ear: External ear normal.   Left Ear: External ear normal.   Mouth/Throat: Oropharynx is clear and moist.   Eyes: Pupils are equal, round, and reactive to light. Neck: Normal range of motion. Neck supple. Cardiovascular: Normal rate and normal heart sounds. Exam reveals no gallop. No murmur heard. Pulmonary/Chest: Breath sounds normal. No respiratory distress. She has no wheezes. Abdominal: Soft. Bowel sounds are normal. She exhibits no mass. There is no rebound. Musculoskeletal: Normal range of motion. She exhibits no edema or tenderness. Legs exam and neurovascular intact capillary refill less than 2 seconds in all distal phalanges old and new bruises to the both legs patient and minimal puffiness to the both legs right is slightly worse in the left has no calf  tenderness   Neurological: She is alert and oriented to person, place, and time. No cranial nerve deficit. She exhibits normal muscle tone. Skin: Skin is warm. No rash noted. No erythema. Psychiatric: Her behavior is normal. Thought content normal.   Nursing note and vitals reviewed.       DIAGNOSTIC RESULTS     EKG: All EKG's are interpreted by the Emergency Department Physician who either signs or Co-signsthis chart in the absence of a cardiologist.        RADIOLOGY:   Non-plain filmimages such as CT, Ultrasound and MRI are read by the radiologist. Plain radiographic images are visualized and preliminarily interpreted by the emergency physician with the below findings:        Interpretation per the Radiologist below, if available at the time

## 2019-09-23 NOTE — ED TRIAGE NOTES
Patient to room #5 for c/o right lower leg drainage x3 days. Minimal amount of clear drainage is noted from area on right lower leg.

## 2019-09-23 NOTE — TELEPHONE ENCOUNTER
Reason for Disposition   SEVERE swelling (e.g., swelling extends above knee, entire leg is swollen, weeping fluid)    Protocols used: LEG SWELLING AND EDEMA-ADULT-OH    Received call from [facility]. Pt daughter, Ngoc Diaz, calling. States pts ankles are swollen and her right ankle is weeping fluid. States it happened all day over the weekend. Her socks and shoe were wet. Right ankle is more swollen than the left side. No pain in calf or thigh. Still able to walk. No chest pain or sob. Call soft transferred to [place], [place]. She spoke with Dr. Evelyn Laguerre, who recommends being evaluated in ED. Informed daughter of recommendation.

## 2019-09-25 LAB — URINE CULTURE, ROUTINE: NORMAL

## 2019-09-30 ENCOUNTER — OFFICE VISIT (OUTPATIENT)
Dept: FAMILY MEDICINE CLINIC | Age: 84
End: 2019-09-30
Payer: MEDICARE

## 2019-09-30 VITALS
OXYGEN SATURATION: 98 % | HEART RATE: 87 BPM | DIASTOLIC BLOOD PRESSURE: 60 MMHG | WEIGHT: 110 LBS | BODY MASS INDEX: 25.46 KG/M2 | HEIGHT: 55 IN | SYSTOLIC BLOOD PRESSURE: 148 MMHG

## 2019-09-30 DIAGNOSIS — G89.29 CHRONIC BILATERAL LOW BACK PAIN, WITH SCIATICA PRESENCE UNSPECIFIED: ICD-10-CM

## 2019-09-30 DIAGNOSIS — M54.5 CHRONIC BILATERAL LOW BACK PAIN, WITH SCIATICA PRESENCE UNSPECIFIED: ICD-10-CM

## 2019-09-30 DIAGNOSIS — M79.89 LEG SWELLING: Primary | ICD-10-CM

## 2019-09-30 PROCEDURE — 99214 OFFICE O/P EST MOD 30 MIN: CPT | Performed by: FAMILY MEDICINE

## 2019-09-30 PROCEDURE — 4040F PNEUMOC VAC/ADMIN/RCVD: CPT | Performed by: FAMILY MEDICINE

## 2019-09-30 PROCEDURE — G8598 ASA/ANTIPLAT THER USED: HCPCS | Performed by: FAMILY MEDICINE

## 2019-09-30 PROCEDURE — G8417 CALC BMI ABV UP PARAM F/U: HCPCS | Performed by: FAMILY MEDICINE

## 2019-09-30 PROCEDURE — 1090F PRES/ABSN URINE INCON ASSESS: CPT | Performed by: FAMILY MEDICINE

## 2019-09-30 PROCEDURE — 1123F ACP DISCUSS/DSCN MKR DOCD: CPT | Performed by: FAMILY MEDICINE

## 2019-09-30 PROCEDURE — G8427 DOCREV CUR MEDS BY ELIG CLIN: HCPCS | Performed by: FAMILY MEDICINE

## 2019-09-30 PROCEDURE — 1036F TOBACCO NON-USER: CPT | Performed by: FAMILY MEDICINE

## 2019-09-30 ASSESSMENT — ENCOUNTER SYMPTOMS
RHINORRHEA: 0
WHEEZING: 0
CONSTIPATION: 0
SORE THROAT: 0
DIARRHEA: 0
COUGH: 0
ABDOMINAL PAIN: 0
SHORTNESS OF BREATH: 0

## 2019-09-30 NOTE — PROGRESS NOTES
1 capsule by mouth 2 times daily 60 capsule 3    lidocaine (XYLOCAINE) 5 % ointment Apply topically as needed. 50 g 1    fluticasone (FLONASE) 50 MCG/ACT nasal spray 1 spray by Nasal route daily 1 Bottle 3    Loratadine 10 MG CAPS Take 10 mg by mouth daily       Potassium 99 MG TABS Take  by mouth daily.  traMADol (ULTRAM) 50 MG tablet Take 1 tablet by mouth every 6 hours as needed. 90 tablet 0    Multiple Vitamins-Minerals (MULTIVITAL PO) Take 1 tablet by mouth daily.  omeprazole (PRILOSEC) 20 MG capsule Take 20 mg by mouth daily.  nitroGLYCERIN (NITROQUICK) 0.4 MG SL tablet Place 0.4 mg under the tongue every 5 minutes as needed.  Omega-3 Fatty Acids (FISH OIL) 1200 MG CAPS Take  by mouth 2 times daily.  aspirin 81 MG tablet Take 81 mg by mouth daily.  furosemide (LASIX) 20 MG tablet Take 1 tablet by mouth daily (Patient not taking: Reported on 9/30/2019) 10 tablet 0     No current facility-administered medications for this visit. ROS:  Review of Systems   Constitutional: Negative for chills and fever. HENT: Negative for rhinorrhea and sore throat. Respiratory: Negative for cough, shortness of breath and wheezing. Cardiovascular: Positive for leg swelling. Gastrointestinal: Negative for abdominal pain, constipation and diarrhea. Endocrine: Negative for polydipsia and polyuria. Genitourinary: Negative for dysuria, frequency and urgency. Neurological: Negative for syncope, light-headedness, numbness and headaches. Psychiatric/Behavioral: Negative for sleep disturbance. The patient is not nervous/anxious. Vitals:    09/30/19 1352   BP: (!) 148/60   Site: Right Upper Arm   Position: Sitting   Cuff Size: Large Adult   Pulse: 87   SpO2: 98%   Weight: 110 lb (49.9 kg)   Height: 4' 7\" (1.397 m)       Physical exam:  Physical Exam   Constitutional: She is oriented to person, place, and time. She appears well-developed and well-nourished.  No

## 2019-10-07 ENCOUNTER — HOSPITAL ENCOUNTER (OUTPATIENT)
Age: 84
Setting detail: SPECIMEN
Discharge: HOME | End: 2019-10-07
Payer: MEDICARE

## 2019-10-07 ENCOUNTER — NURSE ONLY (OUTPATIENT)
Dept: FAMILY MEDICINE CLINIC | Age: 84
End: 2019-10-07
Payer: MEDICARE

## 2019-10-07 DIAGNOSIS — E11.65 UNCONTROLLED TYPE 2 DIABETES MELLITUS WITH HYPERGLYCEMIA (HCC): ICD-10-CM

## 2019-10-07 DIAGNOSIS — E03.9 HYPOTHYROIDISM, UNSPECIFIED TYPE: ICD-10-CM

## 2019-10-07 DIAGNOSIS — Z79.4 TYPE 2 DIABETES MELLITUS WITH DIABETIC POLYNEUROPATHY, WITH LONG-TERM CURRENT USE OF INSULIN (HCC): ICD-10-CM

## 2019-10-07 DIAGNOSIS — E11.42 TYPE 2 DIABETES MELLITUS WITH DIABETIC POLYNEUROPATHY, WITH LONG-TERM CURRENT USE OF INSULIN (HCC): ICD-10-CM

## 2019-10-07 LAB
ANION GAP SERPL CALCULATED.3IONS-SCNC: 12 MEQ/L (ref 9–15)
BUN BLDV-MCNC: 26 MG/DL (ref 8–23)
CALCIUM SERPL-MCNC: 9.1 MG/DL (ref 8.5–9.9)
CHLORIDE BLD-SCNC: 107 MEQ/L (ref 95–107)
CO2: 25 MEQ/L (ref 20–31)
CREAT SERPL-MCNC: 0.99 MG/DL (ref 0.5–0.9)
CREATININE URINE: 38 MG/DL
GFR AFRICAN AMERICAN: >60
GFR NON-AFRICAN AMERICAN: 51.9
GLUCOSE BLD-MCNC: 131 MG/DL (ref 70–99)
HBA1C MFR BLD: 7 % (ref 4.8–5.9)
MICROALBUMIN UR-MCNC: 26.4 MG/DL
MICROALBUMIN/CREAT UR-RTO: 694.7 MG/G (ref 0–30)
POTASSIUM SERPL-SCNC: 4.9 MEQ/L (ref 3.4–4.9)
SODIUM BLD-SCNC: 144 MEQ/L (ref 135–144)
T4 FREE: 1.32 NG/DL (ref 0.84–1.68)
TSH SERPL DL<=0.05 MIU/L-ACNC: 4.77 UIU/ML (ref 0.44–3.86)

## 2019-10-07 PROCEDURE — 82570 ASSAY OF URINE CREATININE: CPT

## 2019-10-07 PROCEDURE — 84443 ASSAY THYROID STIM HORMONE: CPT

## 2019-10-07 PROCEDURE — 36415 COLL VENOUS BLD VENIPUNCTURE: CPT | Performed by: FAMILY MEDICINE

## 2019-10-07 PROCEDURE — 83036 HEMOGLOBIN GLYCOSYLATED A1C: CPT

## 2019-10-07 PROCEDURE — 84439 ASSAY OF FREE THYROXINE: CPT

## 2019-10-07 PROCEDURE — 80048 BASIC METABOLIC PNL TOTAL CA: CPT

## 2019-10-07 PROCEDURE — 82043 UR ALBUMIN QUANTITATIVE: CPT

## 2019-10-09 RX ORDER — LANCETS 28 GAUGE
EACH MISCELLANEOUS
Qty: 100 EACH | Refills: 2 | Status: SHIPPED | OUTPATIENT
Start: 2019-10-09 | End: 2020-02-06

## 2019-10-09 RX ORDER — BLOOD-GLUCOSE METER
KIT MISCELLANEOUS
Qty: 100 STRIP | Refills: 6 | Status: SHIPPED | OUTPATIENT
Start: 2019-10-09 | End: 2020-06-30

## 2019-10-18 ENCOUNTER — OFFICE VISIT (OUTPATIENT)
Dept: ENDOCRINOLOGY | Age: 84
End: 2019-10-18
Payer: MEDICARE

## 2019-10-18 VITALS
RESPIRATION RATE: 15 BRPM | BODY MASS INDEX: 25.51 KG/M2 | HEART RATE: 59 BPM | OXYGEN SATURATION: 98 % | SYSTOLIC BLOOD PRESSURE: 165 MMHG | HEIGHT: 55 IN | DIASTOLIC BLOOD PRESSURE: 72 MMHG | WEIGHT: 110.2 LBS

## 2019-10-18 DIAGNOSIS — E11.65 UNCONTROLLED TYPE 2 DIABETES MELLITUS WITH HYPERGLYCEMIA (HCC): Primary | ICD-10-CM

## 2019-10-18 DIAGNOSIS — E03.9 HYPOTHYROIDISM, UNSPECIFIED TYPE: ICD-10-CM

## 2019-10-18 DIAGNOSIS — Z23 NEEDS FLU SHOT: ICD-10-CM

## 2019-10-18 LAB
CHP ED QC CHECK: ABNORMAL
GLUCOSE BLD-MCNC: 287 MG/DL

## 2019-10-18 PROCEDURE — 82962 GLUCOSE BLOOD TEST: CPT | Performed by: INTERNAL MEDICINE

## 2019-10-18 PROCEDURE — G8482 FLU IMMUNIZE ORDER/ADMIN: HCPCS | Performed by: INTERNAL MEDICINE

## 2019-10-18 PROCEDURE — 90653 IIV ADJUVANT VACCINE IM: CPT | Performed by: INTERNAL MEDICINE

## 2019-10-18 PROCEDURE — 1090F PRES/ABSN URINE INCON ASSESS: CPT | Performed by: INTERNAL MEDICINE

## 2019-10-18 PROCEDURE — G8427 DOCREV CUR MEDS BY ELIG CLIN: HCPCS | Performed by: INTERNAL MEDICINE

## 2019-10-18 PROCEDURE — G8417 CALC BMI ABV UP PARAM F/U: HCPCS | Performed by: INTERNAL MEDICINE

## 2019-10-18 PROCEDURE — G8598 ASA/ANTIPLAT THER USED: HCPCS | Performed by: INTERNAL MEDICINE

## 2019-10-18 PROCEDURE — 1123F ACP DISCUSS/DSCN MKR DOCD: CPT | Performed by: INTERNAL MEDICINE

## 2019-10-18 PROCEDURE — G0008 ADMIN INFLUENZA VIRUS VAC: HCPCS | Performed by: INTERNAL MEDICINE

## 2019-10-18 PROCEDURE — 4040F PNEUMOC VAC/ADMIN/RCVD: CPT | Performed by: INTERNAL MEDICINE

## 2019-10-18 PROCEDURE — 99213 OFFICE O/P EST LOW 20 MIN: CPT | Performed by: INTERNAL MEDICINE

## 2019-10-18 PROCEDURE — 1036F TOBACCO NON-USER: CPT | Performed by: INTERNAL MEDICINE

## 2019-11-21 ENCOUNTER — TELEPHONE (OUTPATIENT)
Dept: ADMINISTRATIVE | Age: 84
End: 2019-11-21

## 2020-01-02 ENCOUNTER — OFFICE VISIT (OUTPATIENT)
Dept: FAMILY MEDICINE CLINIC | Age: 85
End: 2020-01-02
Payer: MEDICARE

## 2020-01-02 VITALS — HEIGHT: 56 IN | WEIGHT: 113 LBS | BODY MASS INDEX: 25.42 KG/M2

## 2020-01-02 PROCEDURE — G8482 FLU IMMUNIZE ORDER/ADMIN: HCPCS | Performed by: FAMILY MEDICINE

## 2020-01-02 PROCEDURE — 4040F PNEUMOC VAC/ADMIN/RCVD: CPT | Performed by: FAMILY MEDICINE

## 2020-01-02 PROCEDURE — G0438 PPPS, INITIAL VISIT: HCPCS | Performed by: FAMILY MEDICINE

## 2020-01-02 PROCEDURE — 1123F ACP DISCUSS/DSCN MKR DOCD: CPT | Performed by: FAMILY MEDICINE

## 2020-01-02 RX ORDER — INSULIN LISPRO 100 [IU]/ML
INJECTION, SOLUTION INTRAVENOUS; SUBCUTANEOUS
COMMUNITY
Start: 2019-12-02 | End: 2020-08-31

## 2020-01-02 ASSESSMENT — LIFESTYLE VARIABLES: HOW OFTEN DO YOU HAVE A DRINK CONTAINING ALCOHOL: 0

## 2020-01-02 ASSESSMENT — PATIENT HEALTH QUESTIONNAIRE - PHQ9
SUM OF ALL RESPONSES TO PHQ QUESTIONS 1-9: 1
SUM OF ALL RESPONSES TO PHQ QUESTIONS 1-9: 1

## 2020-01-02 NOTE — PROGRESS NOTES
Medicare Annual Wellness Visit  Name: Jn Brantley Date: 2020   MRN: [id number] Sex: Female   Age: 80 y.o. Ethnicity: Non-/Non    : 1922 Race: Jia Ochoa is here for Medicare AWV    Screenings for behavioral, psychosocial and functional/safety risks, and cognitive dysfunction are all negative except as indicated below. These results, as well as other patient data from the [address] form, are documented in Flowsheets linked to this Encounter. Allergies   Allergen Reactions    Cimetidine Diarrhea    Codeine Hives     Listed as getting hives from codeine but has repeatedly tolerated Vicodan and percocet without rash or allergy 12 HES    Colesevelam Diarrhea    Glucophage [Metformin Hydrochloride] Diarrhea    Lisinopril Other (See Comments)     Makes pt cough    Lyrica [Pregabalin] Other (See Comments)     Sleep deprivation     Other      Other reaction(s): Intolerance  Naprolen    Oxycodone Swelling    Percocet [Oxycodone-Acetaminophen] Swelling     ankles    Procardia [Nifedipine]     Ranitidine Hcl     Welchol [Colesevelam Hcl] Diarrhea    Sulfamethopyrazine Nausea And Vomiting       Prior to Visit Medications    Medication Sig Taking?  Authorizing Provider   mupirocin (BACTROBAN) 2 % ointment Apply twice daily Yes Historical Provider, MD   HUMALOG KWIKPEN 100 UNIT/ML SOPN inject 4 units in the am & lunch if glucose more than 160 Yes Historical Provider, MD   FREESTYLE LITE strip test 3 times daily Yes Mariola Pedroza MD   [employer] UNILET LANCETS 28G MISC USE AS DIRECTED to test blood sugar THREE TIMES DAILY Yes Mariola Pedroza MD   Compression Stockings MISC by Does not apply route B/l knee high compression stockings; 20-30mmhg Yes Elan Joiner MD   Handicap Placard MISC by Does not apply route Good through 2024 Yes Elan Joiner MD   furosemide (LASIX) 20 MG tablet Take 1 tablet by mouth daily Yes Juan Pacheco MD   isosorbide Problem Relation Age of Onset    Ovarian Cancer Sister     Heart Attack Sister        Agnes (Including outside providers/suppliers regularly involved in providing care):   Patient Care Team:  Jeff Brambila MD as PCP - General (Family Medicine)  Jeff Brambila MD as PCP - REHABILITATION [facility] Empaneled Provider  Qiana Haynes MD (Endocrinology)    Wt Readings from Last 3 Encounters:   01/02/20 113 lb (51.3 kg)   10/18/19 110 lb 3.2 oz (50 kg)   09/30/19 110 lb (49.9 kg)     Vitals:    01/02/20 0941   Weight: 113 lb (51.3 kg)   Height: 4' 7.5\" (1.41 m)     Body mass index is 25.79 kg/m². Based upon direct observation of the patient, evaluation of cognition reveals recent and remote memory intact. Patient's complete Health Risk Assessment and screening values have been reviewed and are found in Flowsheets. The following problems were reviewed today and where indicated follow up appointments were made and/or referrals ordered. Positive Risk Factor Screenings with Interventions:     Fall Risk:  2 or more falls in past year?: no  Fall with injury in past year?: (!) yes  Fall Risk Interventions:    · Home safety tips provided    Depression:  Depression Unable to Assess: Functional capacity motivation limits accuracy  PHQ-2 Score: 1  PHQ-9 Total Score: 1    General Health:  General  In general, how would you say your health is?: Good  In the past 7 days, have you experienced any of the following?  New or Increased Pain, New or Increased Fatigue, Loneliness, Social Isolation, Stress or Anger?: (!) New or Increased Fatigue  Do you get the social and emotional support that you need?: Yes  Do you have a Living Will?: Yes  General Health Risk Interventions:  · Fatigue: patient declines any further evaluation/treatment for this issue    Health Habits/Nutrition:  Health Habits/Nutrition  Do you exercise for at least 20 minutes 2-3 times per week?: (!) No  Have you lost any weight without trying in the past 3 months?: No  Do you eat fewer than 2 meals per day?: No  Have you seen a dentist within the past year?: Yes  Body mass index is 25.79 kg/m². Health Habits/Nutrition Interventions:  · Inadequate physical activity:  patient is not ready to increase his/her physical activity level at this time    Hearing/Vision:  No exam data present  Hearing/Vision  Do you or your family notice any trouble with your hearing?: (!) Yes  Do you have difficulty driving, watching TV, or doing any of your daily activities because of your eyesight?: No  Have you had an eye exam within the past year?: Yes  Hearing/Vision Interventions:  · Hearing concerns:  uses hearing aids    Personalized Preventive Plan   Current Health Maintenance Status  Immunization History   Administered Date(s) Administered    Influenza Vaccine, unspecified formulation 09/15/2016    Influenza Virus Vaccine 11/03/2014    Influenza, High Dose (Fluzone 65 yrs and older) 09/20/2017, 10/08/2018    Influenza, Triv, inactivated, subunit, adjuvanted, IM (Fluad 65 yrs and older) 10/18/2019    Pneumococcal Conjugate 13-valent ([id number]) 03/27/2018    Pneumococcal Polysaccharide ([id number]) 11/16/2015        Health Maintenance   Topic Date Due    DTaP/Tdap/Td vaccine (1 - Tdap) 07/30/1933    Shingles Vaccine (1 of 2) 07/30/1972    Annual Wellness Visit (AWV)  05/29/2019    Lipid screen  04/15/2020    TSH testing  10/07/2020    Potassium monitoring  10/07/2020    Creatinine monitoring  10/07/2020    Flu vaccine  Completed    Pneumococcal 65+ years Vaccine  Completed     Recommendations for Preventive Services Due: see orders and patient instructions/AVS.  . Recommended screening schedule for the next 5-10 years is provided to the patient in written form: see Patient Instructions/AVS.    There are no diagnoses linked to this encounter.

## 2020-01-02 NOTE — PATIENT INSTRUCTIONS
Personalized Preventive Plan for Dayanna Sánchez - 1/2/2020  Medicare offers a range of preventive health benefits. Some of the tests and screenings are paid in full while other may be subject to a deductible, co-insurance, and/or copay. Some of these benefits include a comprehensive review of your medical history including lifestyle, illnesses that may run in your family, and various assessments and screenings as appropriate. After reviewing your medical record and screening and assessments performed today your provider may have ordered immunizations, labs, imaging, and/or referrals for you. A list of these orders (if applicable) as well as your Preventive Care list are included within your After Visit Summary for your review. Other Preventive Recommendations:    · A preventive eye exam performed by an eye specialist is recommended every 1-2 years to screen for glaucoma; cataracts, macular degeneration, and other eye disorders. · A preventive dental visit is recommended every 6 months. · Try to get at least 150 minutes of exercise per week or 10,000 steps per day on a pedometer . · Order or download the FREE \"Exercise & Physical Activity: Your Everyday Guide\" from [employer]. Call 6-485.891.2944 or search [employer] online. · You need 4539-0243 mg of calcium and 7672-5263 IU of vitamin D per day. It is possible to meet your calcium requirement with diet alone, but a vitamin D supplement is usually necessary to meet this goal.  · When exposed to the sun, use a sunscreen that protects against both UVA and UVB radiation with an SPF of 30 or greater. Reapply every 2 to 3 hours or after sweating, drying off with a towel, or swimming. · Always wear a seat belt when traveling in a car. Always wear a helmet when riding a bicycle or motorcycle.

## 2020-01-10 RX ORDER — PEN NEEDLE, DIABETIC 31 G X1/4"
NEEDLE, DISPOSABLE MISCELLANEOUS
Qty: 150 EACH | Refills: 3 | Status: SHIPPED | OUTPATIENT
Start: 2020-01-10 | End: 2020-07-28

## 2020-01-30 RX ORDER — INSULIN GLARGINE 100 [IU]/ML
INJECTION, SOLUTION SUBCUTANEOUS
Qty: 15 ML | Refills: 3 | Status: SHIPPED | OUTPATIENT
Start: 2020-01-30 | End: 2020-06-04 | Stop reason: ALTCHOICE

## 2020-02-06 RX ORDER — LANCETS 28 GAUGE
EACH MISCELLANEOUS
Qty: 100 EACH | Refills: 2 | Status: SHIPPED | OUTPATIENT
Start: 2020-02-06 | End: 2020-05-18

## 2020-02-10 ENCOUNTER — OFFICE VISIT (OUTPATIENT)
Dept: FAMILY MEDICINE CLINIC | Age: 85
End: 2020-02-10
Payer: MEDICARE

## 2020-02-10 VITALS
HEIGHT: 57 IN | DIASTOLIC BLOOD PRESSURE: 80 MMHG | WEIGHT: 111 LBS | HEART RATE: 62 BPM | OXYGEN SATURATION: 95 % | TEMPERATURE: 97 F | SYSTOLIC BLOOD PRESSURE: 158 MMHG | BODY MASS INDEX: 23.95 KG/M2

## 2020-02-10 PROBLEM — I50.9 ACUTE CONGESTIVE HEART FAILURE (HCC): Status: ACTIVE | Noted: 2020-02-10

## 2020-02-10 PROCEDURE — 1036F TOBACCO NON-USER: CPT | Performed by: FAMILY MEDICINE

## 2020-02-10 PROCEDURE — G8420 CALC BMI NORM PARAMETERS: HCPCS | Performed by: FAMILY MEDICINE

## 2020-02-10 PROCEDURE — G8482 FLU IMMUNIZE ORDER/ADMIN: HCPCS | Performed by: FAMILY MEDICINE

## 2020-02-10 PROCEDURE — 1123F ACP DISCUSS/DSCN MKR DOCD: CPT | Performed by: FAMILY MEDICINE

## 2020-02-10 PROCEDURE — 4040F PNEUMOC VAC/ADMIN/RCVD: CPT | Performed by: FAMILY MEDICINE

## 2020-02-10 PROCEDURE — G8427 DOCREV CUR MEDS BY ELIG CLIN: HCPCS | Performed by: FAMILY MEDICINE

## 2020-02-10 PROCEDURE — 1090F PRES/ABSN URINE INCON ASSESS: CPT | Performed by: FAMILY MEDICINE

## 2020-02-10 PROCEDURE — 1111F DSCHRG MED/CURRENT MED MERGE: CPT | Performed by: FAMILY MEDICINE

## 2020-02-10 PROCEDURE — 99214 OFFICE O/P EST MOD 30 MIN: CPT | Performed by: FAMILY MEDICINE

## 2020-02-10 RX ORDER — FUROSEMIDE 20 MG/1
20 TABLET ORAL DAILY
Qty: 30 TABLET | Refills: 3 | Status: SHIPPED | OUTPATIENT
Start: 2020-02-10 | End: 2020-05-15

## 2020-02-10 ASSESSMENT — ENCOUNTER SYMPTOMS
COUGH: 0
SHORTNESS OF BREATH: 1
RHINORRHEA: 0
WHEEZING: 0
SORE THROAT: 0
CONSTIPATION: 0
ABDOMINAL PAIN: 0
DIARRHEA: 0

## 2020-02-10 NOTE — PROGRESS NOTES
GASTROINTESTINAL ENDOSCOPY  2003     Social History     Socioeconomic History    Marital status:      Spouse name: Not on file    Number of children: Not on file    Years of education: Not on file    Highest education level: Not on file   Occupational History    Not on file   Social Needs    Financial resource strain: Not on file    Food insecurity:     Worry: Not on file     Inability: Not on file    Transportation needs:     Medical: Not on file     Non-medical: Not on file   Tobacco Use    Smoking status: Never Smoker    Smokeless tobacco: Never Used   Substance and Sexual Activity    Alcohol use: No    Drug use: No    Sexual activity: Never   Lifestyle    Physical activity:     Days per week: Not on file     Minutes per session: Not on file    Stress: Not on file   Relationships    Social connections:     Talks on phone: Not on file     Gets together: Not on file     Attends Latter day service: Not on file     Active member of club or organization: Not on file     Attends meetings of clubs or organizations: Not on file     Relationship status: Not on file    Intimate partner violence:     Fear of current or ex partner: Not on file     Emotionally abused: Not on file     Physically abused: Not on file     Forced sexual activity: Not on file   Other Topics Concern    Not on file   Social History Narrative    Not on file     Allergies   Allergen Reactions    Cimetidine Diarrhea    Codeine Hives     Listed as getting hives from codeine but has repeatedly tolerated Vicodan and percocet without rash or allergy 2/16/12 HES    Colesevelam Diarrhea    Glucophage [Metformin Hydrochloride] Diarrhea    Lisinopril Other (See Comments)     Makes pt cough    Metformin Diarrhea    Other      Other reaction(s):  Intolerance  Naprolen    Oxycodone Swelling    Percocet [Oxycodone-Acetaminophen] Swelling     ankles    Welchol [Colesevelam Hcl] Diarrhea    Nifedipine Nausea And Vomiting    Pregabalin Other (See Comments) and Rash     Sleep deprivation   Sleep deprivation     Ranitidine Hcl Nausea And Vomiting    Sulfamethopyrazine Nausea And Vomiting     Current Outpatient Medications   Medication Sig Dispense Refill    furosemide (LASIX) 20 MG tablet Take 1 tablet by mouth daily 30 tablet 3    FreeStyle Lancets MISC USE AS DIRECTED to test blood sugar THREE TIMES DAILY 100 each 2    LANTUS SOLOSTAR 100 UNIT/ML injection pen Inject 6 units into the skin nightly 15 mL 3    [employer]ART UNIFINE PENTIPS 31G X 6 MM MISC use 3 TO 4 times daily AS DIRECTED 150 each 3    mupirocin (BACTROBAN) 2 % ointment Apply twice daily      HUMALOG KWIKPEN 100 UNIT/ML SOPN inject 4 units in the am & lunch if glucose more than 160      FREESTYLE LITE strip test 3 times daily 100 strip 6    Compression Stockings MISC by Does not apply route B/l knee high compression stockings; 20-30mmhg 1 each 0    Handicap Placard MISC by Does not apply route Good through 9/30/2024 1 each 0    isosorbide mononitrate (IMDUR) 30 MG extended release tablet TAKE 1 TABLET BY MOUTH DAILY 30 tablet 11    levothyroxine (SYNTHROID) 75 MCG tablet TAKE 1 TABLET EVERY DAY 30 tablet 11    ondansetron (ZOFRAN) 4 MG tablet Take 1 tablet by mouth every 8 hours as needed for Nausea 20 tablet 0    ketorolac (TORADOL) 10 MG tablet Take 1 tablet by mouth every 6 hours as needed for Pain 20 tablet 0    insulin lispro (HUMALOG KWIKPEN) 100 UNIT/ML pen inject 4 units in the am & lunch if glucose more than 160 1 pen 3    simvastatin (ZOCOR) 40 MG tablet TAKE 1 TABLET BY MOUTH nightly 30 tablet 11    sodium chloride (LUIS FERNANDO 128) 5 % ophthalmic solution Use 1 Drop in both eyes four times daily.  hydrochlorothiazide (HYDRODIURIL) 25 MG tablet TAKE 1 TABLET DAILY.   3    lisinopril (PRINIVIL;ZESTRIL) 5 MG tablet bid  3    amLODIPine (NORVASC) 5 MG tablet Take 1 tablet by mouth 2 times daily 60 tablet 3    Fexofenadine HCl (ALLEGRA ALLERGY PO) kg)   Height: 4' 9\" (1.448 m)       Physical exam:  Physical Exam  Vitals signs reviewed. Constitutional:       General: She is not in acute distress. Appearance: She is well-developed. HENT:      Head: Normocephalic and atraumatic. Mouth/Throat:      Pharynx: No oropharyngeal exudate. Neck:      Musculoskeletal: Normal range of motion. Thyroid: No thyromegaly. Cardiovascular:      Rate and Rhythm: Normal rate and regular rhythm. Heart sounds: Normal heart sounds. No murmur. Pulmonary:      Effort: Pulmonary effort is normal. No respiratory distress. Breath sounds: Normal breath sounds. No wheezing. Abdominal:      General: There is no distension. Palpations: Abdomen is soft. Tenderness: There is no abdominal tenderness. There is no guarding or rebound. Musculoskeletal:      Right lower leg: No edema. Left lower leg: No edema. Lymphadenopathy:      Cervical: No cervical adenopathy. Skin:     General: Skin is warm and dry. Neurological:      Mental Status: She is alert and oriented to person, place, and time. Psychiatric:         Behavior: Behavior normal.         Assessment/Plan:  80 y.o. female here mainly for CHF:  - CHF: will continue with daily lasix; encouraged that she f/u with cardiology early this year; home nurse to follow symptoms; she looks euvolemic today     Diagnosis Orders   1. Hospital discharge follow-up  IA DISCHARGE MEDS RECONCILED W/ CURRENT OUTPATIENT MED LIST   2. Acute congestive heart failure, unspecified heart failure type (HCC)  furosemide (LASIX) 20 MG tablet    Amb External Referral To Home Health        Return if symptoms worsen or fail to improve.     Ruben Ho MD

## 2020-04-09 RX ORDER — SIMVASTATIN 40 MG
TABLET ORAL
Qty: 30 TABLET | Refills: 11 | Status: SHIPPED | OUTPATIENT
Start: 2020-04-09 | End: 2021-03-29

## 2020-04-20 ENCOUNTER — VIRTUAL VISIT (OUTPATIENT)
Dept: ENDOCRINOLOGY | Age: 85
End: 2020-04-20
Payer: MEDICARE

## 2020-04-20 PROCEDURE — 99442 PR PHYS/QHP TELEPHONE EVALUATION 11-20 MIN: CPT | Performed by: INTERNAL MEDICINE

## 2020-04-20 NOTE — PROGRESS NOTES
MD   FreeStyle Lancets MISC USE AS DIRECTED to test blood sugar THREE TIMES DAILY  Gorge Naik MD   LANTUS SOLOSTAR 100 UNIT/ML injection pen Inject 6 units into the skin nightly  Gorge Naik MD   [employer]UG [employer]ART UNIFINE PENTIPS 31G X 6 MM MISC use 3 TO 4 times daily AS DIRECTED  Gorge Naik MD   mupirocin (BACTROBAN) 2 % ointment Apply twice daily  Historical Provider, MD   HUMALOG KWIKPEN 100 UNIT/ML SOPN inject 4 units in the am & lunch if glucose more than 160  Historical Provider, MD   FREESTYLE LITE strip test 3 times daily  Kaushal Lopez MD   Compression Stockings MISC by Does not apply route B/l knee high compression stockings; 20-30mmhg  Adin Brito MD   Handicap Placard MISC by Does not apply route Good through 9/30/2024  Adin Brito MD   isosorbide mononitrate (IMDUR) 30 MG extended release tablet TAKE 1 TABLET BY MOUTH DAILY  Adin Brito MD   levothyroxine (SYNTHROID) 75 MCG tablet TAKE 1 TABLET EVERY DAY  Dionne Fallon [place], DESEAN - CNP   ondansetron (ZOFRAN) 4 MG tablet Take 1 tablet by mouth every 8 hours as needed for Nausea  Evelia Hilliard MD   ketorolac (TORADOL) 10 MG tablet Take 1 tablet by mouth every 6 hours as needed for Pain  Evelia Hilliard MD   insulin lispro (HUMALOG KWIKPEN) 100 UNIT/ML pen inject 4 units in the am & lunch if glucose more than 160  Gorge Naik MD   sodium chloride (LUIS FERNANDO 128) 5 % ophthalmic solution Use 1 Drop in both eyes four times daily. Historical Provider, MD   hydrochlorothiazide (HYDRODIURIL) 25 MG tablet TAKE 1 TABLET DAILY.   Historical Provider, MD   lisinopril (PRINIVIL;ZESTRIL) 5 MG tablet bid  Historical Provider, MD   amLODIPine (NORVASC) 5 MG tablet Take 1 tablet by mouth 2 times daily  Adin Brito MD   Fexofenadine HCl (ALLEGRA ALLERGY PO) Take by mouth  Historical Provider, MD   dicyclomine (BENTYL) 10 MG capsule Take 1 capsule by mouth every 6 hours as needed (cramps)  Priyanka Baker MD   insulin glargine (LANTUS SOLOSTAR) 100 UNIT/ML injection pen Inject 6 units into the skin nightly  Gorge Naik MD   DOCQLACE 100 MG capsule Take 1 capsule by mouth 2 times daily  Gabriela Daley MD   lidocaine (XYLOCAINE) 5 % ointment Apply topically as needed. Gabriela Daley MD   fluticasone Juliendaniel Castro) 50 MCG/ACT nasal spray 1 spray by Nasal route daily  Gabriela Daley MD   Loratadine 10 MG CAPS Take 10 mg by mouth daily   Historical Provider, MD   Potassium 99 MG TABS Take  by mouth daily. Historical Provider, MD   traMADol (ULTRAM) 50 MG tablet Take 1 tablet by mouth every 6 hours as needed. Luiza Obrien, DO   Multiple Vitamins-Minerals (MULTIVITAL PO) Take 1 tablet by mouth daily. Historical Provider, MD   omeprazole (PRILOSEC) 20 MG capsule Take 20 mg by mouth daily. Historical Provider, MD   nitroGLYCERIN (NITROQUICK) 0.4 MG SL tablet Place 0.4 mg under the tongue every 5 minutes as needed. Historical Provider, MD   Omega-3 Fatty Acids (FISH OIL) 1200 MG CAPS Take  by mouth 2 times daily. Historical Provider, MD   aspirin 81 MG tablet Take 81 mg by mouth daily. Historical Provider, MD       Social History     Tobacco Use    Smoking status: Never Smoker    Smokeless tobacco: Never Used   Substance Use Topics    Alcohol use: No    Drug use:  No            PHYSICAL EXAMINATION:  [ INSTRUCTIONS:  \"[x]\" Indicates a positive item  \"[]\" Indicates a negative item  -- DELETE ALL ITEMS NOT EXAMINED]  [] Alert  [] Oriented to person/place/time    [] No apparent distress  [] Toxic appearing    [] Face flushed appearing [] Sclera clear  [] Lips are cyanotic      [] Breathing appears normal  [] Appears tachypneic      [] Rash on visible skin    [] Cranial Nerves II-XII grossly intact    [] Motor grossly intact in visible upper extremities    [] Motor grossly intact in visible lower extremities    [] Normal Mood  [] Anxious appearing    [] Depressed appearing  [] Confused appearing      [] Poor short term memory  [] Poor long term memory    [] OTHER:      Due to this being a TeleHealth encounter, evaluation of the following organ systems is limited: Vitals/Constitutional/EENT/Resp/CV/GI//MS/Neuro/Skin/Heme-Lymph-Imm. ASSESSMENT/PLAN:   Diagnosis Orders   1. Uncontrolled type 2 diabetes mellitus with hyperglycemia (HCC)  Basic Metabolic Panel    Hemoglobin A1C   2. Hypothyroidism, unspecified type  T4, Free    TSH without Reflex     Orders Placed This Encounter   Procedures    Basic Metabolic Panel     Standing Status:   Future     Standing Expiration Date:   4/20/2021    Hemoglobin A1C     Standing Status:   Future     Standing Expiration Date:   4/20/2021    T4, Free     Standing Status:   Future     Standing Expiration Date:   4/20/2021    TSH without Reflex     Standing Status:   Future     Standing Expiration Date:   4/20/2021     Continue Lantus 6 units at bedtime plus Humalog twice a day based on sliding day continue Synthroid 75 mcg daily repeat labs in 3 to 6 months time    Time spent with patient was 15 minutes    An  electronic signature was used to authenticate this note. --Acosta Dumont MD on 4/20/2020 at 11:02 AM        Pursuant to the emergency declaration under the [address] [place] [address], [address] [place] [address] authority and the [employer] and [employer] Act, this Virtual  Visit was conducted, with patient's consent, to reduce the patient's risk of exposure to COVID-19 and provide continuity of care for an established patient. Services were provided through a video synchronous discussion virtually to substitute for in-person clinic visit.

## 2020-05-15 RX ORDER — FUROSEMIDE 20 MG/1
20 TABLET ORAL DAILY
Qty: 30 TABLET | Refills: 3 | Status: SHIPPED | OUTPATIENT
Start: 2020-05-15 | End: 2020-12-01

## 2020-05-18 RX ORDER — LANCETS 28 GAUGE
EACH MISCELLANEOUS
Qty: 100 EACH | Refills: 2 | Status: SHIPPED | OUTPATIENT
Start: 2020-05-18

## 2020-06-03 ENCOUNTER — OFFICE VISIT (OUTPATIENT)
Dept: FAMILY MEDICINE CLINIC | Age: 85
End: 2020-06-03
Payer: MEDICARE

## 2020-06-03 ENCOUNTER — TELEPHONE (OUTPATIENT)
Dept: FAMILY MEDICINE CLINIC | Age: 85
End: 2020-06-03

## 2020-06-03 VITALS
DIASTOLIC BLOOD PRESSURE: 50 MMHG | HEART RATE: 54 BPM | WEIGHT: 118 LBS | BODY MASS INDEX: 26.54 KG/M2 | SYSTOLIC BLOOD PRESSURE: 154 MMHG | TEMPERATURE: 97.7 F | OXYGEN SATURATION: 97 % | HEIGHT: 56 IN

## 2020-06-03 PROBLEM — I21.4 NSTEMI (NON-ST ELEVATED MYOCARDIAL INFARCTION) (HCC): Status: ACTIVE | Noted: 2020-06-03

## 2020-06-03 PROCEDURE — G8417 CALC BMI ABV UP PARAM F/U: HCPCS | Performed by: FAMILY MEDICINE

## 2020-06-03 PROCEDURE — 1111F DSCHRG MED/CURRENT MED MERGE: CPT | Performed by: FAMILY MEDICINE

## 2020-06-03 PROCEDURE — 1090F PRES/ABSN URINE INCON ASSESS: CPT | Performed by: FAMILY MEDICINE

## 2020-06-03 PROCEDURE — 1123F ACP DISCUSS/DSCN MKR DOCD: CPT | Performed by: FAMILY MEDICINE

## 2020-06-03 PROCEDURE — 99214 OFFICE O/P EST MOD 30 MIN: CPT | Performed by: FAMILY MEDICINE

## 2020-06-03 PROCEDURE — 4040F PNEUMOC VAC/ADMIN/RCVD: CPT | Performed by: FAMILY MEDICINE

## 2020-06-03 PROCEDURE — 1036F TOBACCO NON-USER: CPT | Performed by: FAMILY MEDICINE

## 2020-06-03 PROCEDURE — G8427 DOCREV CUR MEDS BY ELIG CLIN: HCPCS | Performed by: FAMILY MEDICINE

## 2020-06-03 RX ORDER — CLOPIDOGREL BISULFATE 75 MG/1
TABLET ORAL
COMMUNITY
Start: 2020-05-27

## 2020-06-03 RX ORDER — RANOLAZINE 500 MG/1
500 TABLET, EXTENDED RELEASE ORAL DAILY
COMMUNITY
Start: 2020-05-27

## 2020-06-03 ASSESSMENT — ENCOUNTER SYMPTOMS
COUGH: 0
SHORTNESS OF BREATH: 0
DIARRHEA: 0
WHEEZING: 0
RHINORRHEA: 0
ABDOMINAL PAIN: 0
SORE THROAT: 0
CONSTIPATION: 0

## 2020-06-03 NOTE — PROGRESS NOTES
[address] [facility] 1840 [facility] PRIMARY CARE  [address] [place] [id number]  Dept: [phone]  Dept Fax: [phone]: [phone]   Chief Complaint  Chief Complaint   Patient presents with    Follow-Up from Hospital     MI on 5/22/2020       HPI:  80 y. o.female who presents for hosp f/u:    Admitted to the hospital 5/22 for SOB and CP. Found to have an NSTEMI. Seen by cardiology and due to age was recommended conservative management and to continue her current meds. She was discharged with home PT. Her daughter lives nearby and checks on her. Already followed up with cardiology today and increased the lasix due to leg swelling along with potassium. Also started on metoprolol.          Past Medical History:   Diagnosis Date    Anemia     Arthritis     Bleeding from breast 2003    R breast treated by dr Kerri Kuo CAD (coronary artery disease)     Cancer ([facility] [address]     melanoma and carcinoma    Carpal tunnel syndrome     Chronic back pain     Dupuytren's contracture of hand 2009    Left hand treated by dr Brit Clements GERD (gastroesophageal reflux disease)     History of mammogram 7/2011    WNL    Hyperlipidemia     Hypertension     Obesity     Osteopenia     hips    Peripheral neuropathy     Presbyesophagus 2009    esophogram 3/19/2009    Shingles [id number]    Shingles     Type II or unspecified type diabetes mellitus without mention of complication, not stated as uncontrolled      Past Surgical History:   Procedure Laterality Date    APPENDECTOMY      CARDIAC CATHETERIZATION  04/06/2004    CARDIAC CATHETERIZATION  10/01/2002    CHOLECYSTECTOMY      COLONOSCOPY  2003    rectal hemangiomas, and colon polyps    COLONOSCOPY  04/29/2010    CYSTOSCOPY  2010    LARYNGOSCOPY  2008    MALIGNANT SKIN LESION EXCISION  2001   [employer] OTHER SURGICAL HISTORY  2012    right arm, several sutures applied from injury    PTCA  2002 no stents, 2005 two stents, 2004 two stents    UPPER GASTROINTESTINAL ENDOSCOPY  2003     Social History     Socioeconomic History    Marital status:      Spouse name: Not on file    Number of children: Not on file    Years of education: Not on file    Highest education level: Not on file   Occupational History    Not on file   Social Needs    Financial resource strain: Not on file    Food insecurity     Worry: Not on file     Inability: Not on file    Transportation needs     Medical: Not on file     Non-medical: Not on file   Tobacco Use    Smoking status: Never Smoker    Smokeless tobacco: Never Used   Substance and Sexual Activity    Alcohol use: No    Drug use: No    Sexual activity: Never   Lifestyle    Physical activity     Days per week: Not on file     Minutes per session: Not on file    Stress: Not on file   Relationships    Social connections     Talks on phone: Not on file     Gets together: Not on file     Attends Anabaptist service: Not on file     Active member of club or organization: Not on file     Attends meetings of clubs or organizations: Not on file     Relationship status: Not on file    Intimate partner violence     Fear of current or ex partner: Not on file     Emotionally abused: Not on file     Physically abused: Not on file     Forced sexual activity: Not on file   Other Topics Concern    Not on file   Social History Narrative    Not on file     Allergies   Allergen Reactions    Cimetidine Diarrhea    Codeine Hives     Listed as getting hives from codeine but has repeatedly tolerated Vicodan and percocet without rash or allergy 2/16/12 HES    Colesevelam Diarrhea    Glucophage [Metformin Hydrochloride] Diarrhea    Lisinopril Other (See Comments)     Makes pt cough    Metformin Diarrhea    Other      Other reaction(s):  Intolerance  Naprolen    Oxycodone Swelling    Percocet [Oxycodone-Acetaminophen] Swelling     ankles    Welchol [Colesevelam Hcl] Diarrhea    Nifedipine 1 tablet by mouth daily.  omeprazole (PRILOSEC) 20 MG capsule Take 20 mg by mouth daily.  nitroGLYCERIN (NITROQUICK) 0.4 MG SL tablet Place 0.4 mg under the tongue every 5 minutes as needed.  aspirin 81 MG tablet Take 81 mg by mouth daily.  mupirocin (BACTROBAN) 2 % ointment Apply twice daily      ondansetron (ZOFRAN) 4 MG tablet Take 1 tablet by mouth every 8 hours as needed for Nausea (Patient not taking: Reported on 6/3/2020) 20 tablet 0    ketorolac (TORADOL) 10 MG tablet Take 1 tablet by mouth every 6 hours as needed for Pain (Patient not taking: Reported on 6/3/2020) 20 tablet 0    sodium chloride (LUIS FERNANDO 128) 5 % ophthalmic solution Use 1 Drop in both eyes four times daily.  hydrochlorothiazide (HYDRODIURIL) 25 MG tablet TAKE 1 TABLET DAILY. 3    Fexofenadine HCl (ALLEGRA ALLERGY PO) Take by mouth      dicyclomine (BENTYL) 10 MG capsule Take 1 capsule by mouth every 6 hours as needed (cramps) (Patient not taking: Reported on 6/3/2020) 20 capsule 0    lidocaine (XYLOCAINE) 5 % ointment Apply topically as needed. (Patient not taking: Reported on 6/3/2020) 50 g 1    fluticasone (FLONASE) 50 MCG/ACT nasal spray 1 spray by Nasal route daily (Patient not taking: Reported on 6/3/2020) 1 Bottle 3    Loratadine 10 MG CAPS Take 10 mg by mouth daily       traMADol (ULTRAM) 50 MG tablet Take 1 tablet by mouth every 6 hours as needed. (Patient not taking: Reported on 6/3/2020) 90 tablet 0    Omega-3 Fatty Acids (FISH OIL) 1200 MG CAPS Take  by mouth 2 times daily. No current facility-administered medications for this visit. ROS:  Review of Systems   Constitutional: Negative for chills and fever. HENT: Negative for rhinorrhea and sore throat. Respiratory: Negative for cough, shortness of breath and wheezing. Gastrointestinal: Negative for abdominal pain, constipation and diarrhea. Endocrine: Negative for polydipsia and polyuria.    Genitourinary: Negative for dysuria, frequency and urgency. Neurological: Negative for syncope, light-headedness, numbness and headaches. Psychiatric/Behavioral: Negative for sleep disturbance. The patient is not nervous/anxious. Vitals:    06/03/20 1435 06/03/20 1507   BP: (!) 154/50 (!) 154/50   Site: Right Upper Arm    Position: Sitting    Cuff Size: Small Adult    Pulse: 54    Temp: 97.7 °F (36.5 °C)    TempSrc: Oral    SpO2: 97%    Weight: 118 lb (53.5 kg)    Height: 4' 7.5\" (1.41 m)        Physical exam:  Physical Exam  Vitals signs reviewed. Constitutional:       General: She is not in acute distress. Appearance: She is well-developed. HENT:      Head: Normocephalic and atraumatic. Mouth/Throat:      Pharynx: No oropharyngeal exudate. Neck:      Musculoskeletal: Normal range of motion. Thyroid: No thyromegaly. Cardiovascular:      Rate and Rhythm: Normal rate and regular rhythm. Heart sounds: Normal heart sounds. No murmur. Pulmonary:      Effort: Pulmonary effort is normal. No respiratory distress. Breath sounds: Normal breath sounds. No wheezing. Abdominal:      General: There is no distension. Palpations: Abdomen is soft. Tenderness: There is no abdominal tenderness. There is no guarding or rebound. Lymphadenopathy:      Cervical: No cervical adenopathy. Skin:     General: Skin is warm and dry. Neurological:      Mental Status: She is alert and oriented to person, place, and time. Psychiatric:         Behavior: Behavior normal.         Assessment/Plan:  80 y.o. female here mainly for hosp f/u for NSTEMI:  - seems stable and comfortable. No changes today     Diagnosis Orders   1. Hospital discharge follow-up  [place] DISCHARGE MEDS RECONCILED W/ CURRENT OUTPATIENT MED LIST   2. At high risk for falls     3. NSTEMI (non-ST elevated myocardial infarction) ([address]          Return if symptoms worsen or fail to improve.     Vipin Arevalo MD On the basis of positive falls risk screening, assessment and plan is as follows: home safety tips provided.

## 2020-06-04 RX ORDER — CALCIUM CARBONATE 500(1250)
500 TABLET ORAL DAILY
COMMUNITY

## 2020-06-16 ENCOUNTER — TELEPHONE (OUTPATIENT)
Dept: FAMILY MEDICINE CLINIC | Age: 85
End: 2020-06-16

## 2020-06-16 ENCOUNTER — TELEPHONE (OUTPATIENT)
Dept: ENDOCRINOLOGY | Age: 85
End: 2020-06-16

## 2020-06-16 NOTE — TELEPHONE ENCOUNTER
May increase the lasix to 40mg daily for the next 5 days. If breathing difficulty develops, then would benefit from being evaluated in person or by the ED.

## 2020-06-26 RX ORDER — LEVOTHYROXINE SODIUM 0.07 MG/1
TABLET ORAL
Qty: 30 TABLET | Refills: 11 | OUTPATIENT
Start: 2020-06-26

## 2020-06-26 RX ORDER — LEVOTHYROXINE SODIUM 0.07 MG/1
TABLET ORAL
Qty: 30 TABLET | Refills: 11 | Status: SHIPPED | OUTPATIENT
Start: 2020-06-26 | End: 2021-04-06 | Stop reason: SDUPTHER

## 2020-06-26 NOTE — TELEPHONE ENCOUNTER
Rx requested:  Requested Prescriptions     Pending Prescriptions Disp Refills    levothyroxine (SYNTHROID) 75 MCG tablet 30 tablet 11     Sig: TAKE 1 TABLET EVERY DAY       Last Office Visit:   6/3/2020      Last filled:  7/3/2019    Next Visit Date:  Future Appointments   Date Time Provider Ed Fried   7/23/2020 10:30 AM Otto Sosa MD [facility]   1/4/2021  9:00 AM Aida Lu MD [address] [place] [address]

## 2020-06-30 RX ORDER — BLOOD-GLUCOSE METER
KIT MISCELLANEOUS
Qty: 100 STRIP | Refills: 6 | Status: SHIPPED | OUTPATIENT
Start: 2020-06-30

## 2020-07-20 DIAGNOSIS — E03.9 HYPOTHYROIDISM, UNSPECIFIED TYPE: ICD-10-CM

## 2020-07-20 DIAGNOSIS — E11.65 UNCONTROLLED TYPE 2 DIABETES MELLITUS WITH HYPERGLYCEMIA (HCC): ICD-10-CM

## 2020-07-20 LAB
ANION GAP SERPL CALCULATED.3IONS-SCNC: 12 MEQ/L (ref 9–15)
BUN BLDV-MCNC: 33 MG/DL (ref 8–23)
CALCIUM SERPL-MCNC: 9 MG/DL (ref 8.5–9.9)
CHLORIDE BLD-SCNC: 102 MEQ/L (ref 95–107)
CO2: 26 MEQ/L (ref 20–31)
CREAT SERPL-MCNC: 1.18 MG/DL (ref 0.5–0.9)
GFR AFRICAN AMERICAN: 51.2
GFR NON-AFRICAN AMERICAN: 42.3
GLUCOSE BLD-MCNC: 152 MG/DL (ref 70–99)
HBA1C MFR BLD: 7.7 % (ref 4.8–5.9)
POTASSIUM SERPL-SCNC: 4.4 MEQ/L (ref 3.4–4.9)
SODIUM BLD-SCNC: 140 MEQ/L (ref 135–144)
TSH SERPL DL<=0.05 MIU/L-ACNC: 5.66 UIU/ML (ref 0.44–3.86)

## 2020-07-22 LAB — T4 FREE: 1.47 NG/DL (ref 0.84–1.68)

## 2020-07-23 ENCOUNTER — VIRTUAL VISIT (OUTPATIENT)
Dept: ENDOCRINOLOGY | Age: 85
End: 2020-07-23
Payer: MEDICARE

## 2020-07-23 PROCEDURE — 3051F HG A1C>EQUAL 7.0%<8.0%: CPT | Performed by: INTERNAL MEDICINE

## 2020-07-23 PROCEDURE — 99213 OFFICE O/P EST LOW 20 MIN: CPT | Performed by: INTERNAL MEDICINE

## 2020-07-23 PROCEDURE — 4040F PNEUMOC VAC/ADMIN/RCVD: CPT | Performed by: INTERNAL MEDICINE

## 2020-07-23 PROCEDURE — G8428 CUR MEDS NOT DOCUMENT: HCPCS | Performed by: INTERNAL MEDICINE

## 2020-07-23 PROCEDURE — 1123F ACP DISCUSS/DSCN MKR DOCD: CPT | Performed by: INTERNAL MEDICINE

## 2020-07-23 PROCEDURE — 1090F PRES/ABSN URINE INCON ASSESS: CPT | Performed by: INTERNAL MEDICINE

## 2020-07-23 RX ORDER — FLASH GLUCOSE SCANNING READER
EACH MISCELLANEOUS
Qty: 1 DEVICE | Refills: 0 | Status: SHIPPED | OUTPATIENT
Start: 2020-07-23

## 2020-07-23 NOTE — PROGRESS NOTES
Subjective:      Patient ID: Jose L Diaz is a 80 y.o. female. 2020    TELEHEALTH EVALUATION -- Audio/Visual (During - public health emergency)    Due to [place] 19 outbreak, patient's office visit was converted to a virtual visit. Patient was contacted and agreed to proceed with a virtual visit via [email or web address] me  The risks and benefits of converting to a virtual visit were discussed in light of the current infectious disease epidemic. Patient also understood that insurance coverage and co-pays are up to their individual insurance plans. HPI: Patient's caregiver made aware  visit billable visit agreed to proceed patient was at home I was at my office    Veronique Beltrán (:  1922) has requested an audio/video evaluation for the following concern(s):    Follow-up on type 2 diabetes hypothyroidism lab review A1c is 7.7 TSH was 5.6 reviewed medications no change no hypoglycemia  Complication diabetes include coronary artery disease chronic kidney disease  Patient on Lantus 6 units at night and Humalog 4 units at lunch and breakfast patient testing 3 times daily    Hypothyroidism on Synthroid 75 mcg daily    Results for Dori Millan (MRN [id number]) as of 2020 11:02   Ref.  Range 2020 09:45   Sodium Latest Ref Range: 135 - 144 mEq/L 140   Potassium Latest Ref Range: 3.4 - 4.9 mEq/L 4.4   Chloride Latest Ref Range: 95 - 107 mEq/L 102   CO2 Latest Ref Range: 20 - 31 mEq/L 26   BUN Latest Ref Range: 8 - 23 mg/dL 33 (H)   Creatinine Latest Ref Range: 0.50 - 0.90 mg/dL 1.18 (H)   Anion Gap Latest Ref Range: 9 - 15 mEq/L 12   GFR Non- Latest Ref Range: >60  42.3 (L)   GFR  Latest Ref Range: >60  51.2 (L)   Glucose Latest Ref Range: 70 - 99 mg/dL 152 (H)   Calcium Latest Ref Range: 8.5 - 9.9 mg/dL 9.0   Hemoglobin A1C Latest Ref Range: 4.8 - 5.9 % 7.7 (H)   TSH Latest Ref Range: 0.440 - 3.860 uIU/mL 5.660 (H)   T4 Free Latest Ref Range: 0.84 - 1.68 ng/dL 1.47 Patient Active Problem List   Diagnosis    CAD (coronary artery disease)    Type 2 diabetes mellitus with diabetic polyneuropathy (HCC)    Hyperlipidemia    GERD (gastroesophageal reflux disease)    Anemia    Osteopenia    Chronic back pain    Peripheral neuropathy    Osteoarthritis    Hypothyroidism    Inflammation of eyelid    Lateral cystocele    Fall down stairs    Fuchs' corneal dystrophy    History of artificial lens replacement    Mixed incontinence    Prolapse of vaginal wall    Unsteady gait    Vitreous degeneration    Systolic hypertension    Leg swelling    Acute congestive heart failure (HCC)    NSTEMI (non-ST elevated myocardial infarction) ([facility] [address]       Review of Systems    Prior to Visit Medications    Medication Sig Taking?  Authorizing Provider   FREESTYLE LITE strip test 3 times daily  Mary Ba MD   levothyroxine (SYNTHROID) 75 MCG tablet TAKE 1 TABLET EVERY DAY  Mary MD Mejia   calcium carbonate (OSCAL) 500 MG TABS tablet Take 500 mg by mouth daily  Historical Provider, MD   metoprolol tartrate (LOPRESSOR) 25 MG tablet Take 25 mg by mouth 2 times daily  Historical Provider, MD   ranolazine (RANEXA) 500 MG extended release tablet Take 500 mg by mouth daily   Historical Provider, MD   Acetaminophen (TYLENOL ARTHRITIS EXT RELIEF PO) Take by mouth  Historical Provider, MD   clopidogrel (PLAVIX) 75 MG tablet TAKE 1 TABLET BY MOUTH DAILY  Historical Provider, MD   FreeStyle Lancets MISC USE AS DIRECTED to test blood sugar THREE TIMES DAILY  Gorge Naik MD   furosemide (LASIX) 20 MG tablet Take 1 tablet by mouth daily  Patient taking differently: Take 40 mg by mouth daily   Sang Obrien MD   simvastatin (ZOCOR) 40 MG tablet TAKE 1 TABLET BY MOUTH nightly  Sang Obrien MD   [employer]ART UNIFINE PENTIPS 31G X 6 MM MISC use 3 TO 4 times daily AS DIRECTED  Gorge Naik MD   HUMALOG KWIKPEN 100 UNIT/ML SOPN inject 4 units in the am & lunch if glucose more than 160  Historical Provider, MD   Compression Stockings MISC by Does not apply route B/l knee high compression stockings; 20-30mmhg  Kevin Brooks MD   Handicap Placard MISC by Does not apply route Good through 9/30/2024  Kevin Brooks MD   isosorbide mononitrate (IMDUR) 30 MG extended release tablet TAKE 1 TABLET BY MOUTH DAILY  Kevin Brooks MD   sodium chloride (LUIS FERNANDO 128) 5 % ophthalmic solution Use 1 Drop in both eyes four times daily. Historical Provider, MD   lisinopril (PRINIVIL;ZESTRIL) 5 MG tablet bid  Historical Provider, MD   amLODIPine (NORVASC) 5 MG tablet Take 1 tablet by mouth 2 times daily  Kevin Brooks MD   Fexofenadine HCl (ALLEGRA ALLERGY PO) Take by mouth  Historical Provider, MD   insulin glargine (LANTUS SOLOSTAR) 100 UNIT/ML injection pen Inject 6 units into the skin nightly  Abhi Ellis MD   Potassium 99 MG TABS Take 1 tablet by mouth 2 times daily   Historical Provider, MD   Multiple Vitamins-Minerals (MULTIVITAL PO) Take 1 tablet by mouth daily. Historical Provider, MD   omeprazole (PRILOSEC) 20 MG capsule Take 20 mg by mouth daily. Historical Provider, MD   nitroGLYCERIN (NITROQUICK) 0.4 MG SL tablet Place 0.4 mg under the tongue every 5 minutes as needed. Historical Provider, MD   aspirin 81 MG tablet Take 81 mg by mouth daily. Historical Provider, MD       Social History     Tobacco Use    Smoking status: Never Smoker    Smokeless tobacco: Never Used   Substance Use Topics    Alcohol use: No    Drug use:  No            PHYSICAL EXAMINATION:  [ INSTRUCTIONS:  \"[x]\" Indicates a positive item  \"[]\" Indicates a negative item  -- DELETE ALL ITEMS NOT EXAMINED]  [] Alert  [] Oriented to person/place/time    [] No apparent distress  [] Toxic appearing    [] Face flushed appearing [] Sclera clear  [] Lips are cyanotic      [] Breathing appears normal  [] Appears tachypneic      [] Rash on visible skin    [] Cranial Nerves II-XII grossly intact    [] Motor grossly intact in visible upper extremities    [] Motor grossly intact in visible lower extremities    [] Normal Mood  [] Anxious appearing    [] Depressed appearing  [] Confused appearing      [] Poor short term memory  [] Poor long term memory    [] OTHER:      Due to this being a TeleHealth encounter, evaluation of the following organ systems is limited: Vitals/Constitutional/EENT/Resp/CV/GI//MS/Neuro/Skin/Heme-Lymph-Imm. ASSESSMENT/PLAN:     Diagnosis Orders   1. Hypothyroidism, unspecified type  T4, Free    TSH without Reflex   2. Uncontrolled type 2 diabetes mellitus with hyperglycemia (HCC)  Basic Metabolic Panel    Hemoglobin A1C     Orders Placed This Encounter   Procedures    Basic Metabolic Panel     Standing Status:   Future     Standing Expiration Date:   7/23/2021    Hemoglobin A1C     Standing Status:   Future     Standing Expiration Date:   7/23/2021    T4, Free     Standing Status:   Future     Standing Expiration Date:   7/23/2021    TSH without Reflex     Standing Status:   Future     Standing Expiration Date:   7/23/2021     Orders Placed This Encounter   Medications    Continuous Blood Gluc Sensor (FREESTYLE SIXTO 2 SENSOR SYSTM) MISC     Sig: Every 2 weeks     Dispense:  2 each     Refill:  5    Continuous Blood Gluc  (FREESTYLE SIXTO 14 DAY READER) KATELYNN     Sig: As directed     Dispense:  1 Device     Refill:  0     Continue current dose of Lantus Humalog and Synthroid  Discussed freestyle sixto continuous glucose monitoring for proper blood glucose monitoring avoiding hypoglycemia and hyperglycemia    Placement was 16 minutes    An  electronic signature was used to authenticate this note.     --Mary Stahl MD on 7/23/2020 at 11:02 AM        Pursuant to the emergency declaration under the [address] [place] [address] and the [employer] and [employer] Act, this Virtual  Visit was conducted, with patient's consent, to reduce the

## 2020-07-28 RX ORDER — PEN NEEDLE, DIABETIC 31 G X1/4"
NEEDLE, DISPOSABLE MISCELLANEOUS
Qty: 100 EACH | Refills: 3 | Status: SHIPPED | OUTPATIENT
Start: 2020-07-28 | End: 2020-11-30

## 2020-08-12 RX ORDER — ISOSORBIDE MONONITRATE 30 MG/1
TABLET, EXTENDED RELEASE ORAL
Qty: 30 TABLET | Refills: 11 | Status: SHIPPED | OUTPATIENT
Start: 2020-08-12 | End: 2021-08-05

## 2020-08-12 NOTE — TELEPHONE ENCOUNTER
Rx requested:  Requested Prescriptions     Pending Prescriptions Disp Refills    isosorbide mononitrate (IMDUR) 30 MG extended release tablet [Pharmacy Med Name: isosorbide mononitrate ER 30 mg tablet,extended release 24 hr] 30 tablet 11     Sig: TAKE 1 TABLET BY MOUTH DAILY       Last Office Visit:   6/3/2020      Last filled:  8/16/2019    Next Visit Date:  Future Appointments   Date Time Provider Ed Fried   1/4/2021  9:00 AM Nils Ulloa MD [address] [place] [address]

## 2020-08-31 RX ORDER — INSULIN LISPRO 100 [IU]/ML
INJECTION, SOLUTION INTRAVENOUS; SUBCUTANEOUS
Qty: 15 ML | Refills: 3 | Status: SHIPPED | OUTPATIENT
Start: 2020-08-31 | End: 2021-12-30

## 2020-10-12 ENCOUNTER — OFFICE VISIT (OUTPATIENT)
Dept: FAMILY MEDICINE CLINIC | Age: 85
End: 2020-10-12
Payer: MEDICARE

## 2020-10-12 VITALS — DIASTOLIC BLOOD PRESSURE: 58 MMHG | HEART RATE: 56 BPM | SYSTOLIC BLOOD PRESSURE: 136 MMHG | OXYGEN SATURATION: 96 %

## 2020-10-12 PROCEDURE — G8484 FLU IMMUNIZE NO ADMIN: HCPCS | Performed by: FAMILY MEDICINE

## 2020-10-12 PROCEDURE — 4040F PNEUMOC VAC/ADMIN/RCVD: CPT | Performed by: FAMILY MEDICINE

## 2020-10-12 PROCEDURE — G0008 ADMIN INFLUENZA VIRUS VAC: HCPCS | Performed by: FAMILY MEDICINE

## 2020-10-12 PROCEDURE — 1036F TOBACCO NON-USER: CPT | Performed by: FAMILY MEDICINE

## 2020-10-12 PROCEDURE — 90694 VACC AIIV4 NO PRSRV 0.5ML IM: CPT | Performed by: FAMILY MEDICINE

## 2020-10-12 PROCEDURE — 1123F ACP DISCUSS/DSCN MKR DOCD: CPT | Performed by: FAMILY MEDICINE

## 2020-10-12 PROCEDURE — G8427 DOCREV CUR MEDS BY ELIG CLIN: HCPCS | Performed by: FAMILY MEDICINE

## 2020-10-12 PROCEDURE — G8417 CALC BMI ABV UP PARAM F/U: HCPCS | Performed by: FAMILY MEDICINE

## 2020-10-12 PROCEDURE — 99213 OFFICE O/P EST LOW 20 MIN: CPT | Performed by: FAMILY MEDICINE

## 2020-10-12 PROCEDURE — 1090F PRES/ABSN URINE INCON ASSESS: CPT | Performed by: FAMILY MEDICINE

## 2020-10-12 NOTE — PROGRESS NOTES
[facility] 1840 [facility] PRIMARY CARE  [address] [place] [address]  Dept: [phone]  Dept Fax: [phone]: [phone]   Chief Complaint  Chief Complaint   Patient presents with    Wheezing     x 3 days, heavy breathing, cough     Swelling     Pt would like to have you check her legs to make she ther eis not fluid on the,        HPI:  80 y. o.female who presents for Resp symptoms:  (here with daughter)    Last night was breathing harder with wheezing for the past 3 days; doing much better today; hx of \"leaky valves\", CHF, NSTEMI; today denies swelling of the legs and the SOB is greatly improved.     Past Medical History:   Diagnosis Date    Anemia     Arthritis     Bleeding from breast 2003    R breast treated by dr Anabelle Jacques CAD (coronary artery disease)     Cancer ([facility] [address]     melanoma and carcinoma    Carpal tunnel syndrome     Chronic back pain     Dupuytren's contracture of hand 2009    Left hand treated by dr Zi Vidal GERD (gastroesophageal reflux disease)     History of mammogram 7/2011    WNL    Hyperlipidemia     Hypertension     Obesity     Osteopenia     hips    Peripheral neuropathy     Presbyesophagus 2009    esophogram 3/19/2009    Shingles [id number]    Shingles     Type II or unspecified type diabetes mellitus without mention of complication, not stated as uncontrolled      Past Surgical History:   Procedure Laterality Date    APPENDECTOMY      CARDIAC CATHETERIZATION  04/06/2004    CARDIAC CATHETERIZATION  10/01/2002    CHOLECYSTECTOMY      COLONOSCOPY  2003    rectal hemangiomas, and colon polyps    COLONOSCOPY  04/29/2010    CYSTOSCOPY  2010    LARYNGOSCOPY  2008    MALIGNANT SKIN LESION EXCISION  2001   [facility] OTHER SURGICAL HISTORY  2012    right arm, several sutures applied from injury    PTCA  2002 no stents, 2005 two stents, 2004 two stents   [facility] ENDOSCOPY  2003 Social History     Socioeconomic History    Marital status:      Spouse name: Not on file    Number of children: Not on file    Years of education: Not on file    Highest education level: Not on file   Occupational History    Not on file   Social Needs    Financial resource strain: Not on file    Food insecurity     Worry: Not on file     Inability: Not on file    Transportation needs     Medical: Not on file     Non-medical: Not on file   Tobacco Use    Smoking status: Never Smoker    Smokeless tobacco: Never Used   Substance and Sexual Activity    Alcohol use: No    Drug use: No    Sexual activity: Never   Lifestyle    Physical activity     Days per week: Not on file     Minutes per session: Not on file    Stress: Not on file   Relationships    Social connections     Talks on phone: Not on file     Gets together: Not on file     Attends Anglican service: Not on file     Active member of club or organization: Not on file     Attends meetings of clubs or organizations: Not on file     Relationship status: Not on file    Intimate partner violence     Fear of current or ex partner: Not on file     Emotionally abused: Not on file     Physically abused: Not on file     Forced sexual activity: Not on file   Other Topics Concern    Not on file   Social History Narrative    Not on file     Allergies   Allergen Reactions    Cimetidine Diarrhea    Codeine Hives     Listed as getting hives from codeine but has repeatedly tolerated Vicodan and percocet without rash or allergy 2/16/12 HES    Colesevelam Diarrhea    Glucophage [Metformin Hydrochloride] Diarrhea    Lisinopril Other (See Comments)     Makes pt cough    Metformin Diarrhea    Other      Other reaction(s):  Intolerance  Naprolen    Oxycodone Swelling    Percocet [Oxycodone-Acetaminophen] Swelling     ankles    Welchol [Colesevelam Hcl] Diarrhea    Nifedipine Nausea And Vomiting    Pregabalin Other (See Comments) and Rash Sleep deprivation   Sleep deprivation     Ranitidine Hcl Nausea And Vomiting    Sulfamethopyrazine Nausea And Vomiting     Current Outpatient Medications   Medication Sig Dispense Refill    HUMALOG KWIKPEN 100 UNIT/ML SOPN inject 4 units in the am & lunch if glucose more than 160 15 mL 3    isosorbide mononitrate (IMDUR) 30 MG extended release tablet TAKE 1 TABLET BY MOUTH DAILY 30 tablet 11    [employer] MART UNIFINE PENTIPS 31G X 6 MM MISC use 3 TO 4 times daily AS DIRECTED 100 each 3    Continuous Blood Gluc Sensor (FREESTYLE SIXTO 2 SENSOR SYSTM) MISC Every 2 weeks 2 each 5    Continuous Blood Gluc  (FREESTYLE SIXTO 14 DAY READER) KATELYNN As directed 1 Device 0    FREESTYLE LITE strip test 3 times daily 100 strip 6    levothyroxine (SYNTHROID) 75 MCG tablet TAKE 1 TABLET EVERY DAY 30 tablet 11    calcium carbonate (OSCAL) 500 MG TABS tablet Take 500 mg by mouth daily      metoprolol tartrate (LOPRESSOR) 25 MG tablet Take 25 mg by mouth 2 times daily      ranolazine (RANEXA) 500 MG extended release tablet Take 500 mg by mouth daily       Acetaminophen (TYLENOL ARTHRITIS EXT RELIEF PO) Take by mouth      clopidogrel (PLAVIX) 75 MG tablet TAKE 1 TABLET BY MOUTH DAILY      FreeStyle Lancets MISC USE AS DIRECTED to test blood sugar THREE TIMES DAILY 100 each 2    furosemide (LASIX) 20 MG tablet Take 1 tablet by mouth daily 30 tablet 3    simvastatin (ZOCOR) 40 MG tablet TAKE 1 TABLET BY MOUTH nightly 30 tablet 11    Compression Stockings MISC by Does not apply route B/l knee high compression stockings; 20-30mmhg 1 each 0    Handicap Placard MISC by Does not apply route Good through 9/30/2024 1 each 0    sodium chloride (LUIS FERNANDO 128) 5 % ophthalmic solution Use 1 Drop in both eyes four times daily.       lisinopril (PRINIVIL;ZESTRIL) 5 MG tablet bid  3    amLODIPine (NORVASC) 5 MG tablet Take 1 tablet by mouth 2 times daily 60 tablet 3    Fexofenadine HCl (ALLEGRA ALLERGY PO) Take by mouth      soft.      Tenderness: There is no abdominal tenderness. There is no guarding or rebound. Lymphadenopathy:      Cervical: No cervical adenopathy. Skin:     General: Skin is warm and dry. Neurological:      Mental Status: She is alert and oriented to person, place, and time. Psychiatric:         Behavior: Behavior normal.         Assessment/Plan:  80 y.o. female here mainly for SOB:  - Multifactorial with age, CHF but appears quite comfortable today; watchful waiting for now. Diagnosis Orders   1. SOB (shortness of breath)     2. Need for influenza vaccination  INFLUENZA, QUADV, ADJUVANTED, 65 YRS =, IM, PF, PREFILL SYR, 0.5ML (FLUAD)        Return if symptoms worsen or fail to improve.     Kathryn Figueroa MD

## 2020-10-13 ASSESSMENT — ENCOUNTER SYMPTOMS
CONSTIPATION: 0
SHORTNESS OF BREATH: 0
COUGH: 0
ABDOMINAL PAIN: 0
DIARRHEA: 0
RHINORRHEA: 0
WHEEZING: 0
SORE THROAT: 0

## 2020-11-02 ENCOUNTER — TELEPHONE (OUTPATIENT)
Dept: INTERNAL MEDICINE | Age: 85
End: 2020-11-02

## 2020-11-02 NOTE — TELEPHONE ENCOUNTER
Pt's daughter called  Pulse ox: 84% today  Yesterday: 88%  Last week it was 86% and went up to 91% She is tired after taking pills and has some trouble swallowing sometimes and no good appetite. Pt does not want to go to the er. Please advise.

## 2020-11-03 ENCOUNTER — VIRTUAL VISIT (OUTPATIENT)
Dept: FAMILY MEDICINE CLINIC | Age: 85
End: 2020-11-03
Payer: MEDICARE

## 2020-11-03 PROCEDURE — G2025 DIS SITE TELE SVCS RHC/FQHC: HCPCS | Performed by: FAMILY MEDICINE

## 2020-11-03 ASSESSMENT — ENCOUNTER SYMPTOMS
SORE THROAT: 0
CONSTIPATION: 0
SHORTNESS OF BREATH: 1
ABDOMINAL PAIN: 0
COUGH: 0
DIARRHEA: 0
WHEEZING: 0
RHINORRHEA: 0

## 2020-11-03 NOTE — PROGRESS NOTES
11/3/2020    TELEHEALTH EVALUATION -- Audio/Visual (During EDJJK-08 public health emergency)    Due to COVID 19 outbreak, patient's office visit was converted to a virtual visit. Patient was contacted and agreed to proceed with a virtual visit via Telephone Visit  The risks and benefits of converting to a virtual visit were discussed in light of the current infectious disease epidemic. Patient also understood that insurance coverage and co-pays are up to their individual insurance plans. Chief Complaint   Patient presents with    Fatigue     worn out from doing anything. o2 is low around 83%, heavy breathing , heart rate is 55, concerned she should go to the ED         HPI:    Veronique Beltrán (:  1922) has requested an audio/video evaluation for the following concern(s):    (Spoke with daughter with patient in the background)    Oxygen has been in the 80's with 83% this morning; she has fatigue with any exertion over the past week; had a little CP/back pain one day last week that resolved. Has a little coughing; has been drooling but no numbness or drooping of the face. Review of Systems   Constitutional: Positive for fatigue. Negative for chills and fever. HENT: Negative for rhinorrhea and sore throat. Respiratory: Positive for shortness of breath. Negative for cough and wheezing. Gastrointestinal: Negative for abdominal pain, constipation and diarrhea. Endocrine: Negative for polydipsia and polyuria. Genitourinary: Negative for dysuria, frequency and urgency. Neurological: Negative for syncope, light-headedness, numbness and headaches. Psychiatric/Behavioral: Negative for sleep disturbance. The patient is not nervous/anxious. Prior to Visit Medications    Medication Sig Taking?  Authorizing Provider   HUMALOG KWIKPEN 100 UNIT/ML SOPN inject 4 units in the am & lunch if glucose more than 160 Yes Gogre Naik MD   isosorbide mononitrate (IMDUR) 30 MG extended release tablet TAKE 1 TABLET BY MOUTH DAILY Yes Nithya Mann MD   [facility] UNIFINE PENTIPS 31G X 6 MM MISC use 3 TO 4 times daily AS DIRECTED Yes Kathlyn Riedel, MD   Continuous Blood Gluc Sensor (FREESTYLE SIXTO 2 SENSOR SYSTM) MISC Every 2 weeks Yes Kathlyn Riedel, MD   Continuous Blood Gluc  (FREESTYLE SIXTO 14 DAY READER) KATELYNN As directed Yes Kathlyn Riedel, MD FREESTYLE LITE strip test 3 times daily Yes Kathlyn Riedel, MD   levothyroxine (SYNTHROID) 75 MCG tablet TAKE 1 TABLET EVERY DAY Yes Kathlyn Riedel, MD   calcium carbonate (OSCAL) 500 MG TABS tablet Take 500 mg by mouth daily Yes Historical Provider, MD   metoprolol tartrate (LOPRESSOR) 25 MG tablet Take 25 mg by mouth 2 times daily Yes Historical Provider, MD   ranolazine (RANEXA) 500 MG extended release tablet Take 500 mg by mouth daily  Yes Historical Provider, MD   Acetaminophen (TYLENOL ARTHRITIS EXT RELIEF PO) Take by mouth Yes Historical Provider, MD   clopidogrel (PLAVIX) 75 MG tablet TAKE 1 TABLET BY MOUTH DAILY Yes Historical Provider, MD   FreeStyle Lancets MISC USE AS DIRECTED to test blood sugar THREE TIMES DAILY Yes Kathlyn Riedel, MD   furosemide (LASIX) 20 MG tablet Take 1 tablet by mouth daily Yes Nithya Mann MD   simvastatin (ZOCOR) 40 MG tablet TAKE 1 TABLET BY MOUTH nightly Yes Nithya Mann MD   Compression Stockings MISC by Does not apply route B/l knee high compression stockings; 20-30mmhg Yes Nithya Mann MD   Handicap Placard MISC by Does not apply route Good through 9/30/2024 Yes Nithya Mann MD   sodium chloride (LUIS FERNANDO 128) 5 % ophthalmic solution Use 1 Drop in both eyes four times daily.  Yes Historical Provider, MD   lisinopril (PRINIVIL;ZESTRIL) 5 MG tablet bid Yes Historical Provider, MD   amLODIPine (NORVASC) 5 MG tablet Take 1 tablet by mouth 2 times daily Yes Nithya Mann MD   Fexofenadine HCl (ALLEGRA ALLERGY PO) Take by mouth Yes Historical Provider, MD   insulin glargine (LANTUS SOLOSTAR) 100 UNIT/ML injection pen Inject 6 units into the skin nightly Yes Naheed Laureano MD   Potassium 99 MG TABS Take 1 tablet by mouth 2 times daily  Yes Historical Provider, MD   Multiple Vitamins-Minerals (MULTIVITAL PO) Take 1 tablet by mouth daily. Yes Historical Provider, MD   omeprazole (PRILOSEC) 20 MG capsule Take 20 mg by mouth daily. Yes Historical Provider, MD   nitroGLYCERIN (NITROQUICK) 0.4 MG SL tablet Place 0.4 mg under the tongue every 5 minutes as needed. Yes Historical Provider, MD   aspirin 81 MG tablet Take 81 mg by mouth daily. Yes Historical Provider, MD       Social History     Tobacco Use    Smoking status: Never Smoker    Smokeless tobacco: Never Used   Substance Use Topics    Alcohol use: No    Drug use: No        Allergies   Allergen Reactions    Cimetidine Diarrhea    Codeine Hives     Listed as getting hives from codeine but has repeatedly tolerated Vicodan and percocet without rash or allergy 2/16/12 HES    Colesevelam Diarrhea    Glucophage [Metformin Hydrochloride] Diarrhea    Lisinopril Other (See Comments)     Makes pt cough    Metformin Diarrhea    Other      Other reaction(s):  Intolerance  Naprolen    Oxycodone Swelling    Percocet [Oxycodone-Acetaminophen] Swelling     ankles    Welchol [Colesevelam Hcl] Diarrhea    Nifedipine Nausea And Vomiting    Pregabalin Other (See Comments) and Rash     Sleep deprivation   Sleep deprivation     Ranitidine Hcl Nausea And Vomiting    Sulfamethopyrazine Nausea And Vomiting   ,   Past Medical History:   Diagnosis Date    Anemia     Arthritis     Bleeding from breast 2003    R breast treated by dr Bandar Brody CAD (coronary artery disease)     Cancer ([facility] [address]     melanoma and carcinoma    Carpal tunnel syndrome     Chronic back pain     Dupuytren's contracture of hand 2009    Left hand treated by dr Chris Maxwell GERD (gastroesophageal reflux disease)     History of mammogram 7/2011    WNL    Hyperlipidemia     Hypertension     Obesity     Osteopenia     hips    Peripheral neuropathy     Presbyesophagus 2009    esophogram 3/19/2009    Shingles [id number]    Shingles     Type II or unspecified type diabetes mellitus without mention of complication, not stated as uncontrolled    ,   Past Surgical History:   Procedure Laterality Date    APPENDECTOMY      CARDIAC CATHETERIZATION  04/06/2004    CARDIAC CATHETERIZATION  10/01/2002    CHOLECYSTECTOMY      COLONOSCOPY  2003    rectal hemangiomas, and colon polyps    COLONOSCOPY  04/29/2010    CYSTOSCOPY  2010    LARYNGOSCOPY  2008    MALIGNANT SKIN LESION EXCISION  2001   [employer] OTHER SURGICAL HISTORY  2012    right arm, several sutures applied from injury    PTCA  2002 no stents, 2005 two stents, 2004 two stents    UPPER GASTROINTESTINAL ENDOSCOPY  2003   ,   Social History     Tobacco Use    Smoking status: Never Smoker    Smokeless tobacco: Never Used   Substance Use Topics    Alcohol use: No    Drug use: No   ,   Family History   Problem Relation Age of Onset    Ovarian Cancer Sister     Heart Attack Sister    ,   Immunization History   Administered Date(s) Administered    Influenza Vaccine, unspecified formulation 09/15/2016, 11/01/2019    Influenza Virus Vaccine 11/03/2014, 09/15/2016    Influenza Whole 11/03/2014    Influenza, High Dose (Fluzone 65 yrs and older) 09/20/2017, 10/08/2018    Influenza, Quadv, adjuvanted, 65 yrs +, IM, PF (Fluad) 10/12/2020    Influenza, Triv, inactivated, subunit, adjuvanted, IM (Fluad 65 yrs and older) 10/18/2019    Pneumococcal Conjugate 13-valent ([id number]) 03/27/2018    Pneumococcal Polysaccharide ([id number]) 11/16/2015       PHYSICAL EXAMINATION:  [ INSTRUCTIONS:  \"[x]\" Indicates a positive item  \"[]\" Indicates a negative item  -- DELETE ALL ITEMS NOT EXAMINED]  [x] Alert  [x] Oriented to person/place/time    [x] No apparent distress  [] Toxic appearing    [] Face flushed appearing [] Sclera clear  [] Lips are cyanotic      [x] Breathing appears normal  [] Appears

## 2020-11-30 RX ORDER — PEN NEEDLE, DIABETIC 31 G X1/4"
NEEDLE, DISPOSABLE MISCELLANEOUS
Qty: 100 EACH | Refills: 3 | Status: SHIPPED | OUTPATIENT
Start: 2020-11-30 | End: 2021-05-27

## 2020-12-01 RX ORDER — FUROSEMIDE 20 MG/1
TABLET ORAL
Qty: 30 TABLET | Refills: 3 | Status: SHIPPED | OUTPATIENT
Start: 2020-12-01 | End: 2021-01-26

## 2020-12-01 NOTE — TELEPHONE ENCOUNTER
Rx requested:  Requested Prescriptions     Pending Prescriptions Disp Refills    furosemide (LASIX) 20 MG tablet [Pharmacy Med Name: furosemide 20 mg tablet] 30 tablet 3     Sig: TAKE 1 TABLET BY MOUTH ONCE DAILY       Last Office Visit:   11/3/2020      Last filled:  5/15/2020    Next Visit Date:  Future Appointments   Date Time Provider Ed Fried   1/4/2021  9:00 AM Nerissa Pruett MD [address] [place] [address]

## 2020-12-23 RX ORDER — FLASH GLUCOSE SENSOR
KIT MISCELLANEOUS
Qty: 2 EACH | Refills: 5 | Status: SHIPPED | OUTPATIENT
Start: 2020-12-23 | End: 2021-09-29 | Stop reason: SDUPTHER

## 2021-01-26 ENCOUNTER — OFFICE VISIT (OUTPATIENT)
Dept: FAMILY MEDICINE CLINIC | Age: 86
End: 2021-01-26
Payer: MEDICARE

## 2021-01-26 VITALS
SYSTOLIC BLOOD PRESSURE: 130 MMHG | HEART RATE: 74 BPM | OXYGEN SATURATION: 95 % | WEIGHT: 108 LBS | DIASTOLIC BLOOD PRESSURE: 60 MMHG | TEMPERATURE: 96.6 F | HEIGHT: 56 IN | BODY MASS INDEX: 24.3 KG/M2

## 2021-01-26 DIAGNOSIS — Z00.00 ROUTINE GENERAL MEDICAL EXAMINATION AT A HEALTH CARE FACILITY: Primary | ICD-10-CM

## 2021-01-26 DIAGNOSIS — S16.1XXA STRAIN OF NECK MUSCLE, INITIAL ENCOUNTER: ICD-10-CM

## 2021-01-26 PROCEDURE — 1090F PRES/ABSN URINE INCON ASSESS: CPT | Performed by: FAMILY MEDICINE

## 2021-01-26 PROCEDURE — 1123F ACP DISCUSS/DSCN MKR DOCD: CPT | Performed by: FAMILY MEDICINE

## 2021-01-26 PROCEDURE — 1036F TOBACCO NON-USER: CPT | Performed by: FAMILY MEDICINE

## 2021-01-26 PROCEDURE — 99213 OFFICE O/P EST LOW 20 MIN: CPT | Performed by: FAMILY MEDICINE

## 2021-01-26 PROCEDURE — G8427 DOCREV CUR MEDS BY ELIG CLIN: HCPCS | Performed by: FAMILY MEDICINE

## 2021-01-26 PROCEDURE — G0439 PPPS, SUBSEQ VISIT: HCPCS | Performed by: FAMILY MEDICINE

## 2021-01-26 PROCEDURE — G8484 FLU IMMUNIZE NO ADMIN: HCPCS | Performed by: FAMILY MEDICINE

## 2021-01-26 PROCEDURE — G8420 CALC BMI NORM PARAMETERS: HCPCS | Performed by: FAMILY MEDICINE

## 2021-01-26 PROCEDURE — 4040F PNEUMOC VAC/ADMIN/RCVD: CPT | Performed by: FAMILY MEDICINE

## 2021-01-26 RX ORDER — AMLODIPINE BESYLATE 10 MG/1
TABLET ORAL
COMMUNITY
Start: 2021-01-22 | End: 2021-01-26

## 2021-01-26 RX ORDER — FUROSEMIDE 40 MG/1
TABLET ORAL
COMMUNITY
Start: 2021-01-22

## 2021-01-26 SDOH — ECONOMIC STABILITY: TRANSPORTATION INSECURITY
IN THE PAST 12 MONTHS, HAS THE LACK OF TRANSPORTATION KEPT YOU FROM MEDICAL APPOINTMENTS OR FROM GETTING MEDICATIONS?: NO

## 2021-01-26 SDOH — ECONOMIC STABILITY: INCOME INSECURITY: HOW HARD IS IT FOR YOU TO PAY FOR THE VERY BASICS LIKE FOOD, HOUSING, MEDICAL CARE, AND HEATING?: NOT HARD AT ALL

## 2021-01-26 SDOH — ECONOMIC STABILITY: TRANSPORTATION INSECURITY
IN THE PAST 12 MONTHS, HAS LACK OF TRANSPORTATION KEPT YOU FROM MEETINGS, WORK, OR FROM GETTING THINGS NEEDED FOR DAILY LIVING?: NO

## 2021-01-26 SDOH — ECONOMIC STABILITY: FOOD INSECURITY: WITHIN THE PAST 12 MONTHS, THE FOOD YOU BOUGHT JUST DIDN'T LAST AND YOU DIDN'T HAVE MONEY TO GET MORE.: NEVER TRUE

## 2021-01-26 ASSESSMENT — ENCOUNTER SYMPTOMS
DIARRHEA: 0
SORE THROAT: 0
ABDOMINAL PAIN: 0
COUGH: 0
CONSTIPATION: 0
SHORTNESS OF BREATH: 0
WHEEZING: 0
RHINORRHEA: 0

## 2021-01-26 ASSESSMENT — LIFESTYLE VARIABLES
HOW OFTEN DO YOU HAVE A DRINK CONTAINING ALCOHOL: NEVER
HOW OFTEN DO YOU HAVE A DRINK CONTAINING ALCOHOL: 0
AUDIT-C TOTAL SCORE: INCOMPLETE
AUDIT TOTAL SCORE: INCOMPLETE

## 2021-01-26 ASSESSMENT — PATIENT HEALTH QUESTIONNAIRE - PHQ9
2. FEELING DOWN, DEPRESSED OR HOPELESS: 0
SUM OF ALL RESPONSES TO PHQ QUESTIONS 1-9: 0
SUM OF ALL RESPONSES TO PHQ QUESTIONS 1-9: 0

## 2021-01-26 NOTE — PATIENT INSTRUCTIONS
Personalized Preventive Plan for Foley Seema - 1/26/2021  Medicare offers a range of preventive health benefits. Some of the tests and screenings are paid in full while other may be subject to a deductible, co-insurance, and/or copay. Some of these benefits include a comprehensive review of your medical history including lifestyle, illnesses that may run in your family, and various assessments and screenings as appropriate. After reviewing your medical record and screening and assessments performed today your provider may have ordered immunizations, labs, imaging, and/or referrals for you. A list of these orders (if applicable) as well as your Preventive Care list are included within your After Visit Summary for your review. Other Preventive Recommendations:    · A preventive eye exam performed by an eye specialist is recommended every 1-2 years to screen for glaucoma; cataracts, macular degeneration, and other eye disorders. · A preventive dental visit is recommended every 6 months. · Try to get at least 150 minutes of exercise per week or 10,000 steps per day on a pedometer . · Order or download the FREE \"Exercise & Physical Activity: Your Everyday Guide\" from The [employer]. Call 2-820.893.3386 or search The [employer] online. · You need 0402-6817 mg of calcium and 8946-3995 IU of vitamin D per day. It is possible to meet your calcium requirement with diet alone, but a vitamin D supplement is usually necessary to meet this goal.  · When exposed to the sun, use a sunscreen that protects against both UVA and UVB radiation with an SPF of 30 or greater. Reapply every 2 to 3 hours or after sweating, drying off with a towel, or swimming. · Always wear a seat belt when traveling in a car. Always wear a helmet when riding a bicycle or motorcycle.

## 2021-01-26 NOTE — PROGRESS NOTES
[address] [facility]  [place] [phone] [id number]  Dept: [phone]  Dept Fax: : [phone]   Chief Complaint  Chief Complaint   Patient presents with    Medicare AWV       HPI:  80 y. o.female who presents for HA and neck:    x1 week with neck pain and some intermittent HAs; taking tylenol and using aspercreme with little relief. It's a little better today.     Past Medical History:   Diagnosis Date    Anemia     Arthritis     Bleeding from breast 2003    R breast treated by dr Ene Quezada CAD (coronary artery disease)     Cancer ([facility] [address]     melanoma and carcinoma    Carpal tunnel syndrome     Chronic back pain     Dupuytren's contracture of hand 2009    Left hand treated by dr Theresa Willard GERD (gastroesophageal reflux disease)     History of mammogram 7/2011    WNL    Hyperlipidemia     Hypertension     Obesity     Osteopenia     hips    Peripheral neuropathy     Presbyesophagus 2009    esophogram 3/19/2009    Shingles [id number]    Shingles     Type II or unspecified type diabetes mellitus without mention of complication, not stated as uncontrolled      Past Surgical History:   Procedure Laterality Date    APPENDECTOMY      CARDIAC CATHETERIZATION  04/06/2004    CARDIAC CATHETERIZATION  10/01/2002    CHOLECYSTECTOMY      COLONOSCOPY  2003    rectal hemangiomas, and colon polyps    COLONOSCOPY  04/29/2010    CYSTOSCOPY  2010    LARYNGOSCOPY  2008    MALIGNANT SKIN LESION EXCISION  2001   [employer] OTHER SURGICAL HISTORY  2012    right arm, several sutures applied from injury    PTCA  2002 no stents, 2005 two stents, 2004 two stents    UPPER GASTROINTESTINAL ENDOSCOPY  2003     Social History     Socioeconomic History    Marital status:      Spouse name: Not on file    Number of children: Not on file    Years of education: Not on file    Highest education level: Not on file Occupational History    Not on file   Social Needs    Financial resource strain: Not hard at all   [place] insecurity     Worry: Never true     Inability: Never true   [employer] needs     Medical: No     Non-medical: No   Tobacco Use    Smoking status: Never Smoker    Smokeless tobacco: Never Used   Substance and Sexual Activity    Alcohol use: No    Drug use: No    Sexual activity: Never   Lifestyle    Physical activity     Days per week: Not on file     Minutes per session: Not on file    Stress: Not on file   Relationships    Social connections     Talks on phone: Not on file     Gets together: Not on file     Attends [employer] service: Not on file     Active member of club or organization: Not on file     Attends meetings of clubs or organizations: Not on file     Relationship status: Not on file    Intimate partner violence     Fear of current or ex partner: Not on file     Emotionally abused: Not on file     Physically abused: Not on file     Forced sexual activity: Not on file   Other Topics Concern    Not on file   Social History Narrative    Not on file     Allergies   Allergen Reactions    Cimetidine Diarrhea    Codeine Hives     Listed as getting hives from codeine but has repeatedly tolerated Vicodan and percocet without rash or allergy 2/16/12 HES    Colesevelam Diarrhea    Glucophage [Metformin Hydrochloride] Diarrhea    Lisinopril Other (See Comments)     Makes pt cough    Metformin Diarrhea    Other      Other reaction(s):  Intolerance  Naprolen    Oxycodone Swelling    Percocet [Oxycodone-Acetaminophen] Swelling     ankles    Sulfa Antibiotics     Welchol [Colesevelam Hcl] Diarrhea    Nifedipine Nausea And Vomiting    Pregabalin Other (See Comments) and Rash     Sleep deprivation   Sleep deprivation     Ranitidine Hcl Nausea And Vomiting    Sulfamethopyrazine Nausea And Vomiting     Current Outpatient Medications   Medication Sig Dispense Refill    potassium gluconate 550 mg tablet Take 1 tablet by mouth daily      furosemide (LASIX) 40 MG tablet       Continuous Blood Gluc Sensor (FREESTYLE SIXTO 14 DAY SENSOR) MISC Every 2 weeks 2 each 5    DRUG MART UNIFINE PENTIPS 31G X 6 MM MISC use 3 TO 4 times daily AS DIRECTED 100 each 3    HUMALOG KWIKPEN 100 UNIT/ML SOPN inject 4 units in the am & lunch if glucose more than 160 15 mL 3    isosorbide mononitrate (IMDUR) 30 MG extended release tablet TAKE 1 TABLET BY MOUTH DAILY 30 tablet 11    Continuous Blood Gluc  (FREESTYLE SIXTO 14 DAY READER) KATELYNN As directed 1 Device 0    FREESTYLE LITE strip test 3 times daily 100 strip 6    levothyroxine (SYNTHROID) 75 MCG tablet TAKE 1 TABLET EVERY DAY 30 tablet 11    calcium carbonate (OSCAL) 500 MG TABS tablet Take 500 mg by mouth daily      metoprolol tartrate (LOPRESSOR) 25 MG tablet Take 25 mg by mouth 2 times daily      ranolazine (RANEXA) 500 MG extended release tablet Take 500 mg by mouth daily       Acetaminophen (TYLENOL ARTHRITIS EXT RELIEF PO) Take by mouth      clopidogrel (PLAVIX) 75 MG tablet TAKE 1 TABLET BY MOUTH DAILY      FreeStyle Lancets MISC USE AS DIRECTED to test blood sugar THREE TIMES DAILY 100 each 2    simvastatin (ZOCOR) 40 MG tablet TAKE 1 TABLET BY MOUTH nightly 30 tablet 11    Compression Stockings MISC by Does not apply route B/l knee high compression stockings; 20-30mmhg 1 each 0    Handicap Placard MISC by Does not apply route Good through 9/30/2024 1 each 0    sodium chloride (LUIS FERNANDO 128) 5 % ophthalmic solution Use 1 Drop in both eyes four times daily.       lisinopril (PRINIVIL;ZESTRIL) 5 MG tablet bid  3    amLODIPine (NORVASC) 5 MG tablet Take 1 tablet by mouth 2 times daily 60 tablet 3    Fexofenadine HCl (ALLEGRA ALLERGY PO) Take by mouth      insulin glargine (LANTUS SOLOSTAR) 100 UNIT/ML injection pen Inject 6 units into the skin nightly 5 Pen 3    Potassium 99 MG TABS Take 1 tablet by mouth 2 times daily       Multiple Vitamins-Minerals (MULTIVITAL PO) Take 1 tablet by mouth daily.  omeprazole (PRILOSEC) 20 MG capsule Take 20 mg by mouth daily.  nitroGLYCERIN (NITROQUICK) 0.4 MG SL tablet Place 0.4 mg under the tongue every 5 minutes as needed.  aspirin 81 MG tablet Take 81 mg by mouth daily. No current facility-administered medications for this visit. ROS:  Review of Systems   Constitutional: Negative for chills and fever. HENT: Negative for rhinorrhea and sore throat. Respiratory: Negative for cough, shortness of breath and wheezing. Gastrointestinal: Negative for abdominal pain, constipation and diarrhea. Endocrine: Negative for polydipsia and polyuria. Genitourinary: Negative for dysuria, frequency and urgency. Musculoskeletal: Positive for neck pain. Neurological: Negative for syncope, light-headedness, numbness and headaches. Psychiatric/Behavioral: Negative for sleep disturbance. The patient is not nervous/anxious. Vitals:    01/26/21 1123   BP: 130/60   Site: Right Upper Arm   Position: Sitting   Cuff Size: Small Adult   Pulse: 74   Temp: 96.6 °F (35.9 °C)   TempSrc: Infrared   SpO2: 95%   Weight: 108 lb (49 kg)   Height: 4' 7.5\" (1.41 m)       Physical exam:  Physical Exam  Vitals signs reviewed. Constitutional:       General: She is not in acute distress. Appearance: She is well-developed. HENT:      Head: Normocephalic and atraumatic. Mouth/Throat:      Pharynx: No oropharyngeal exudate. Neck:      Musculoskeletal: Normal range of motion. Thyroid: No thyromegaly. Cardiovascular:      Rate and Rhythm: Normal rate and regular rhythm. Heart sounds: Normal heart sounds. No murmur. Pulmonary:      Effort: Pulmonary effort is normal. No respiratory distress. Breath sounds: Normal breath sounds. No wheezing. Abdominal:      General: There is no distension. Palpations: Abdomen is soft. Tenderness:  There is no abdominal tenderness. There is no guarding or rebound. Lymphadenopathy:      Cervical: No cervical adenopathy. Skin:     General: Skin is warm and dry. Neurological:      Mental Status: She is alert and oriented to person, place, and time. Psychiatric:         Behavior: Behavior normal.         Assessment/Plan:  80 y.o. female here mainly for neck strain:  - neck strain leading to HAs; med choices are limited due to her blood thinners; discussed increasing the tylenol to 1000mg TID prn     Diagnosis Orders   1. Routine general medical examination at a health care facility     2. Strain of neck muscle, initial encounter          Return for Medicare Annual Wellness Visit in 1 year.     Danielle Davies MD

## 2021-01-26 NOTE — PROGRESS NOTES
Medicare Annual Wellness Visit  Name: Moose Warner Date: 2021   MRN: [id number] Sex: Female   Age: 80 y.o. Ethnicity: Non-/Non    : 1922 Race: Ania [place] is here for Medicare AWV    Screenings for behavioral, psychosocial and functional/safety risks, and cognitive dysfunction are all negative except as indicated below. These results, as well as other patient data from the [address] [employer] [address] form, are documented in Flowsheets linked to this Encounter. Allergies   Allergen Reactions    Cimetidine Diarrhea    Codeine Hives     Listed as getting hives from codeine but has repeatedly tolerated Vicodan and percocet without rash or allergy 12 HES    Colesevelam Diarrhea    Glucophage [Metformin Hydrochloride] Diarrhea    Lisinopril Other (See Comments)     Makes pt cough    Metformin Diarrhea    Other      Other reaction(s): Intolerance  Naprolen    Oxycodone Swelling    Percocet [Oxycodone-Acetaminophen] Swelling     ankles    Sulfa Antibiotics     Welchol [Colesevelam Hcl] Diarrhea    Nifedipine Nausea And Vomiting    Pregabalin Other (See Comments) and Rash     Sleep deprivation   Sleep deprivation     Ranitidine Hcl Nausea And Vomiting    Sulfamethopyrazine Nausea And Vomiting       Prior to Visit Medications    Medication Sig Taking?  Authorizing Provider   potassium gluconate 550 mg tablet Take 1 tablet by mouth daily Yes Historical Provider, MD   furosemide (LASIX) 40 MG tablet  Yes Historical Provider, MD   Continuous Blood Gluc Sensor (FREESTYLE SIXTO 14 DAY SENSOR) MISC Every 2 weeks Yes Genia White MD   DRUG MART UNIFINE PENTIPS 31G X 6 MM MISC use 3 TO 4 times daily AS DIRECTED Yes Genia White MD   HUMALOG KWIKPEN 100 UNIT/ML SOPN inject 4 units in the am & lunch if glucose more than 160 Yes Gorge Naik MD   isosorbide mononitrate (IMDUR) 30 MG extended release tablet TAKE 1 TABLET BY MOUTH DAILY Yes Spenser Hooks MD   Continuous Blood Gluc  (FREESTYLE SIXTO 14 DAY READER) KATELYNN As directed Yes MD RENETTA UsSTYLE LITE strip test 3 times daily Yes Aaron Duffy MD   levothyroxine (SYNTHROID) 75 MCG tablet TAKE 1 TABLET EVERY DAY Yes Aaron Duffy MD   calcium carbonate (OSCAL) 500 MG TABS tablet Take 500 mg by mouth daily Yes Historical Provider, MD   metoprolol tartrate (LOPRESSOR) 25 MG tablet Take 25 mg by mouth 2 times daily Yes Historical Provider, MD   ranolazine (RANEXA) 500 MG extended release tablet Take 500 mg by mouth daily  Yes Historical Provider, MD   Acetaminophen (TYLENOL ARTHRITIS EXT RELIEF PO) Take by mouth Yes Historical Provider, MD   clopidogrel (PLAVIX) 75 MG tablet TAKE 1 TABLET BY MOUTH DAILY Yes Historical Provider, MD G[place] Lancets MISC USE AS DIRECTED to test blood sugar THREE TIMES DAILY Yes Aaron Duffy MD   simvastatin (ZOCOR) 40 MG tablet TAKE 1 TABLET BY MOUTH nightly Yes Cherelle Gonzalez MD   Compression Stockings MISC by Does not apply route B/l knee high compression stockings; 20-30mmhg Yes Cherelle Gonzalez MD   Handicap Placard MISC by Does not apply route Good through 9/30/2024 Yes Cherelle Gonzalez MD   sodium chloride (LUIS FERNANDO 128) 5 % ophthalmic solution Use 1 Drop in both eyes four times daily. Yes Historical Provider, MD   lisinopril (PRINIVIL;ZESTRIL) 5 MG tablet bid Yes Historical Provider, MD   amLODIPine (NORVASC) 5 MG tablet Take 1 tablet by mouth 2 times daily Yes Cherelle Gonzalez MD   Fexofenadine HCl (ALLEGRA ALLERGY PO) Take by mouth Yes Historical Provider, MD   insulin glargine (LANTUS SOLOSTAR) 100 UNIT/ML injection pen Inject 6 units into the skin nightly Yes Aaron Duffy MD   Potassium 99 MG TABS Take 1 tablet by mouth 2 times daily  Yes Historical Provider, MD   Multiple Vitamins-Minerals (MULTIVITAL PO) Take 1 tablet by mouth daily. Yes Historical Provider, MD   omeprazole (PRILOSEC) 20 MG capsule Take 20 mg by mouth daily.    Yes Historical Provider, MD   nitroGLYCERIN (NITROQUICK) 0.4 MG SL tablet Place 0.4 mg under the tongue every 5 minutes as needed. Yes Historical Provider, MD   aspirin 81 MG tablet Take 81 mg by mouth daily.    Yes Historical Provider, MD       Past Medical History:   Diagnosis Date    Anemia     Arthritis     Bleeding from breast 2003    R breast treated by dr Eliseo Bettencourt CAD (coronary artery disease)     Cancer ([facility] [address]     melanoma and carcinoma    Carpal tunnel syndrome     Chronic back pain     Dupuytren's contracture of hand 2009    Left hand treated by dr Nikia Marcum GERD (gastroesophageal reflux disease)     History of mammogram 7/2011    WNL    Hyperlipidemia     Hypertension     Obesity     Osteopenia     hips    Peripheral neuropathy     Presbyesophagus 2009    esophogram 3/19/2009    Shingles [id number]    Shingles     Type II or unspecified type diabetes mellitus without mention of complication, not stated as uncontrolled        Past Surgical History:   Procedure Laterality Date    APPENDECTOMY      CARDIAC CATHETERIZATION  04/06/2004    CARDIAC CATHETERIZATION  10/01/2002    CHOLECYSTECTOMY      COLONOSCOPY  2003    rectal hemangiomas, and colon polyps    COLONOSCOPY  04/29/2010    CYSTOSCOPY  2010    LARYNGOSCOPY  2008    MALIGNANT SKIN LESION EXCISION  2001   [address] [place] [address] OTHER SURGICAL HISTORY  2012    right arm, several sutures applied from injury    PTCA  2002 no stents, 2005 two stents, 2004 two stents    UPPER GASTROINTESTINAL ENDOSCOPY  2003       Family History   Problem Relation Age of Onset    Ovarian Cancer Sister     Heart Attack Sister        CareTeam (Including outside providers/suppliers regularly involved in providing care):   Patient Care Team:  Ghulam Gomes MD as PCP - General (Family Medicine)  Ghulam Gomes MD as PCP - [facility] Empaneled Provider  Chhaya Zhang MD (Endocrinology)    Wt Readings from Last 3 Encounters:   01/26/21 108 lb (49 kg)   06/03/20 118 lb (53.5 kg)   02/10/20 111 lb (50.3 kg)     Vitals: 01/26/21 1123   BP: 130/60   Site: Right Upper Arm   Position: Sitting   Cuff Size: Small Adult   Pulse: 74   Temp: 96.6 °F (35.9 °C)   TempSrc: Infrared   SpO2: 95%   Weight: 108 lb (49 kg)   Height: 4' 7.5\" (1.41 m)     Body mass index is 24.65 kg/m². Based upon direct observation of the patient, evaluation of cognition reveals recent and remote memory intact. Patient's complete Health Risk Assessment and screening values have been reviewed and are found in Flowsheets. The following problems were reviewed today and where indicated follow up appointments were made and/or referrals ordered. Positive Risk Factor Screenings with Interventions:          General Health and ACP:  General  In general, how would you say your health is?: Fair  In the past 7 days, have you experienced any of the following? New or Increased Pain, New or Increased Fatigue, Loneliness, Social Isolation, Stress or Anger?: (!) New or Increased Pain, Loneliness, Social Isolation  Do you get the social and emotional support that you need?: Yes  Do you have a Living Will?: Yes  Advance Directives     Power of  Living Will ACP-Advance Directive ACP-Power of     Not on File Not on File Not on File Not on File      General Health Risk Interventions:  · says she is doing well and has what she needs.     Health Habits/Nutrition:  Health Habits/Nutrition  Do you exercise for at least 20 minutes 2-3 times per week?: (!) No  Have you lost any weight without trying in the past 3 months?: No  Do you eat fewer than 2 meals per day?: No  Have you seen a dentist within the past year?: (!) No  Body mass index: 24.65  Health Habits/Nutrition Interventions:  · Inadequate physical activity:  patient agrees to exercise for at least 150 minutes/week  · Dental exam overdue:  patient encouraged to make appointment with his/her dentist    Hearing/Vision:  No exam data present  Hearing/Vision  Do you or your family notice any trouble with your Vaccine (1 of 2) 07/30/1938    DTaP/Tdap/Td vaccine (1 - Tdap) 07/30/1941    Shingles Vaccine (1 of 2) 07/30/1972    Annual Wellness Visit (AWV)  05/29/2019    Lipid screen  05/24/2021    TSH testing  11/04/2021    Potassium monitoring  11/06/2021    Creatinine monitoring  11/06/2021    Flu vaccine  Completed    Pneumococcal 65+ years Vaccine  Completed    Hepatitis A vaccine  Aged Out    Hib vaccine  Aged Out    Meningococcal (ACWY) vaccine  Aged Out     Recommendations for [facility] Due: see orders and patient instructions/AVS.  . Recommended screening schedule for the next 5-10 years is provided to the patient in written form: see Patient Instructions/AVS.    There are no diagnoses linked to this encounter.

## 2021-03-18 ENCOUNTER — VIRTUAL VISIT (OUTPATIENT)
Dept: FAMILY MEDICINE CLINIC | Age: 86
End: 2021-03-18
Payer: MEDICARE

## 2021-03-18 DIAGNOSIS — S22.42XA CLOSED FRACTURE OF MULTIPLE RIBS OF LEFT SIDE, INITIAL ENCOUNTER: ICD-10-CM

## 2021-03-18 DIAGNOSIS — Z09 HOSPITAL DISCHARGE FOLLOW-UP: Primary | ICD-10-CM

## 2021-03-18 PROCEDURE — G8482 FLU IMMUNIZE ORDER/ADMIN: HCPCS | Performed by: FAMILY MEDICINE

## 2021-03-18 PROCEDURE — G2025 DIS SITE TELE SVCS RHC/FQHC: HCPCS | Performed by: FAMILY MEDICINE

## 2021-03-18 PROCEDURE — G8427 DOCREV CUR MEDS BY ELIG CLIN: HCPCS | Performed by: FAMILY MEDICINE

## 2021-03-18 PROCEDURE — 1123F ACP DISCUSS/DSCN MKR DOCD: CPT | Performed by: FAMILY MEDICINE

## 2021-03-18 PROCEDURE — G8420 CALC BMI NORM PARAMETERS: HCPCS | Performed by: FAMILY MEDICINE

## 2021-03-18 PROCEDURE — 4040F PNEUMOC VAC/ADMIN/RCVD: CPT | Performed by: FAMILY MEDICINE

## 2021-03-18 PROCEDURE — 1036F TOBACCO NON-USER: CPT | Performed by: FAMILY MEDICINE

## 2021-03-18 PROCEDURE — 1090F PRES/ABSN URINE INCON ASSESS: CPT | Performed by: FAMILY MEDICINE

## 2021-03-18 RX ORDER — OXYCODONE HYDROCHLORIDE AND ACETAMINOPHEN 5; 325 MG/1; MG/1
1 TABLET ORAL 3 TIMES DAILY PRN
Qty: 15 TABLET | Refills: 0 | Status: SHIPPED | OUTPATIENT
Start: 2021-03-18 | End: 2021-03-23

## 2021-03-18 ASSESSMENT — ENCOUNTER SYMPTOMS
SHORTNESS OF BREATH: 0
WHEEZING: 0
DIARRHEA: 0
COUGH: 0
CONSTIPATION: 0
RHINORRHEA: 0
SORE THROAT: 0
ABDOMINAL PAIN: 0

## 2021-03-18 NOTE — PROGRESS NOTES
[facility]  Virtual Visit  [address] [facility] [address] [facility]  [id number]  Dept: [phone]  Dept Fax: : [phone]     Due to COVID 23 outbreak, patient's office visit was converted to a virtual visit. Patient was contacted and agreed to proceed with a virtual visit via Telephone Visit  The risks and benefits of converting to a virtual visit were discussed in light of the current infectious disease epidemic. Patient also understood that insurance coverage and co-pays are up to their individual insurance plans. Chief Complaint  Chief Complaint   Patient presents with   [facility] 3/17/2021, broke 2 ribs, in a lot of pain        HPI:  80 y. o.female who presents for the following:      Hosp f/u: seen at [facility] yesterday after fall over her walker and landed on her bedside commode; imaging revealed L-side 8/9 ribs with displaced fractures; also had b/l pleural effusions; has been using otc lidocaine patch which isn't helpful; she is comfortable at rest but any activity is painful. Using incentive spirometer. Review of Systems   Constitutional: Negative for chills and fever. HENT: Negative for congestion, rhinorrhea and sore throat. Respiratory: Negative for cough, shortness of breath and wheezing. Cardiovascular:        L sided rib pain with activity   Gastrointestinal: Negative for abdominal pain, constipation and diarrhea. Endocrine: Negative for polydipsia and polyuria. Genitourinary: Negative for dysuria, frequency and urgency. Neurological: Negative for syncope, light-headedness, numbness and headaches. Psychiatric/Behavioral: Negative for sleep disturbance. The patient is not nervous/anxious.         Past Medical History:   Diagnosis Date    Anemia     Arthritis     Bleeding from breast 2003    R breast treated by dr Danette Pierre CAD (coronary artery disease)     Cancer ([facility] [address] on file     Active member of club or organization: Not on file     Attends meetings of clubs or organizations: Not on file     Relationship status: Not on file    Intimate partner violence     Fear of current or ex partner: Not on file     Emotionally abused: Not on file     Physically abused: Not on file     Forced sexual activity: Not on file   Other Topics Concern    Not on file   Social History Narrative    Not on file     Family History   Problem Relation Age of Onset    Ovarian Cancer Sister     Heart Attack Sister       Allergies   Allergen Reactions    Cimetidine Diarrhea    Codeine Hives     Listed as getting hives from codeine but has repeatedly tolerated Vicodan and percocet without rash or allergy 2/16/12 HES    Colesevelam Diarrhea    Glucophage [Metformin Hydrochloride] Diarrhea    Lisinopril Other (See Comments)     Makes pt cough    Metformin Diarrhea    Other      Other reaction(s): Intolerance  Naprolen    Oxycodone Swelling    Percocet [Oxycodone-Acetaminophen] Swelling     ankles    Sulfa Antibiotics     Welchol [Colesevelam Hcl] Diarrhea    Nifedipine Nausea And Vomiting    Pregabalin Other (See Comments) and Rash     Sleep deprivation   Sleep deprivation     Ranitidine Hcl Nausea And Vomiting    Sulfamethopyrazine Nausea And Vomiting     Current Outpatient Medications   Medication Sig Dispense Refill    oxyCODONE-acetaminophen (PERCOCET) 5-325 MG per tablet Take 1 tablet by mouth 3 times daily as needed for Pain for up to 5 days.  15 tablet 0    potassium gluconate 550 mg tablet Take 1 tablet by mouth daily      furosemide (LASIX) 40 MG tablet       Continuous Blood Gluc Sensor (FREESTYLE SIXTO 14 DAY SENSOR) MISC Every 2 weeks 2 each 5    DRUG MART UNIFINE PENTIPS 31G X 6 MM MISC use 3 TO 4 times daily AS DIRECTED 100 each 3    HUMALOG KWIKPEN 100 UNIT/ML SOPN inject 4 units in the am & lunch if glucose more than 160 15 mL 3    isosorbide mononitrate (IMDUR) 30 MG extended release tablet TAKE 1 TABLET BY MOUTH DAILY 30 tablet 11    Continuous Blood Gluc  (FREESTYLE SIXTO 14 DAY READER) KATELYNN As directed 1 Device 0    FREESTYLE LITE strip test 3 times daily 100 strip 6    levothyroxine (SYNTHROID) 75 MCG tablet TAKE 1 TABLET EVERY DAY 30 tablet 11    calcium carbonate (OSCAL) 500 MG TABS tablet Take 500 mg by mouth daily      metoprolol tartrate (LOPRESSOR) 25 MG tablet Take 25 mg by mouth 2 times daily      ranolazine (RANEXA) 500 MG extended release tablet Take 500 mg by mouth daily       Acetaminophen (TYLENOL ARTHRITIS EXT RELIEF PO) Take by mouth      clopidogrel (PLAVIX) 75 MG tablet TAKE 1 TABLET BY MOUTH DAILY      FreeStyle Lancets MISC USE AS DIRECTED to test blood sugar THREE TIMES DAILY 100 each 2    simvastatin (ZOCOR) 40 MG tablet TAKE 1 TABLET BY MOUTH nightly 30 tablet 11    Compression Stockings MISC by Does not apply route B/l knee high compression stockings; 20-30mmhg 1 each 0    Handicap Placard MISC by Does not apply route Good through 9/30/2024 1 each 0    sodium chloride (LUIS FERNANDO 128) 5 % ophthalmic solution Use 1 Drop in both eyes four times daily.  lisinopril (PRINIVIL;ZESTRIL) 5 MG tablet bid  3    amLODIPine (NORVASC) 5 MG tablet Take 1 tablet by mouth 2 times daily 60 tablet 3    Fexofenadine HCl (ALLEGRA ALLERGY PO) Take by mouth      insulin glargine (LANTUS SOLOSTAR) 100 UNIT/ML injection pen Inject 6 units into the skin nightly 5 Pen 3    Potassium 99 MG TABS Take 1 tablet by mouth 2 times daily       Multiple Vitamins-Minerals (MULTIVITAL PO) Take 1 tablet by mouth daily.  omeprazole (PRILOSEC) 20 MG capsule Take 20 mg by mouth daily.  nitroGLYCERIN (NITROQUICK) 0.4 MG SL tablet Place 0.4 mg under the tongue every 5 minutes as needed.  aspirin 81 MG tablet Take 81 mg by mouth daily. No current facility-administered medications for this visit.           Physical exam:  Physical Exam  Vitals signs reviewed. Constitutional:       General: She is not in acute distress. Appearance: She is well-developed. HENT:      Head: Normocephalic and atraumatic. Mouth/Throat:      Pharynx: No oropharyngeal exudate. Neck:      Musculoskeletal: Normal range of motion. Thyroid: No thyromegaly. Cardiovascular:      Rate and Rhythm: Normal rate and regular rhythm. Heart sounds: Normal heart sounds. No murmur. Pulmonary:      Effort: Pulmonary effort is normal. No respiratory distress. Breath sounds: Normal breath sounds. No wheezing. Abdominal:      General: There is no distension. Palpations: Abdomen is soft. Tenderness: There is no abdominal tenderness. There is no guarding or rebound. Lymphadenopathy:      Cervical: No cervical adenopathy. Skin:     General: Skin is warm and dry. Neurological:      Mental Status: She is alert and oriented to person, place, and time. Psychiatric:         Behavior: Behavior normal.         Assessment/Plan:  80 y.o. female here mainly for L rib fx after fall:  - pt lives with daughter; will start percocet; discussed warnings on drowsiness and need to check on her more often while on the percocet. Can cont the lidocaine patches and add another 2000mg of tylenol to her day max if desired. Diagnosis Orders   1. Hospital discharge follow-up     2. Closed fracture of multiple ribs of left side, initial encounter  oxyCODONE-acetaminophen (PERCOCET) 5-325 MG per tablet        Return if symptoms worsen or fail to improve. Verner Sexton, MD       Pursuant to the emergency declaration under the [facility] waiver authority and the [employer] Act, this Virtual  Visit was conducted, with patient's consent, to reduce the patient's risk of exposure to COVID-19 and provide continuity of care for an established patient.     Services were provided through a video synchronous discussion virtually to substitute for in-person clinic visit.

## 2021-03-29 RX ORDER — SIMVASTATIN 40 MG
TABLET ORAL
Qty: 30 TABLET | Refills: 11 | Status: SHIPPED | OUTPATIENT
Start: 2021-03-29 | End: 2022-04-20

## 2021-04-02 DIAGNOSIS — E03.9 HYPOTHYROIDISM, UNSPECIFIED TYPE: ICD-10-CM

## 2021-04-02 DIAGNOSIS — E11.65 UNCONTROLLED TYPE 2 DIABETES MELLITUS WITH HYPERGLYCEMIA (HCC): ICD-10-CM

## 2021-04-02 LAB
ANION GAP SERPL CALCULATED.3IONS-SCNC: 15 MEQ/L (ref 9–15)
BUN BLDV-MCNC: 19 MG/DL (ref 8–23)
CALCIUM SERPL-MCNC: 8.8 MG/DL (ref 8.5–9.9)
CHLORIDE BLD-SCNC: 107 MEQ/L (ref 95–107)
CO2: 23 MEQ/L (ref 20–31)
CREAT SERPL-MCNC: 1.09 MG/DL (ref 0.5–0.9)
GFR AFRICAN AMERICAN: 56
GFR NON-AFRICAN AMERICAN: 46.3
GLUCOSE BLD-MCNC: 115 MG/DL (ref 70–99)
HBA1C MFR BLD: 7.9 % (ref 4.8–5.9)
POTASSIUM SERPL-SCNC: 4.7 MEQ/L (ref 3.4–4.9)
SODIUM BLD-SCNC: 145 MEQ/L (ref 135–144)
T4 FREE: 1.28 NG/DL (ref 0.84–1.68)
TSH SERPL DL<=0.05 MIU/L-ACNC: 15.35 UIU/ML (ref 0.44–3.86)

## 2021-04-06 ENCOUNTER — VIRTUAL VISIT (OUTPATIENT)
Dept: ENDOCRINOLOGY | Age: 86
End: 2021-04-06
Payer: MEDICARE

## 2021-04-06 DIAGNOSIS — E11.65 UNCONTROLLED TYPE 2 DIABETES MELLITUS WITH HYPERGLYCEMIA (HCC): Primary | ICD-10-CM

## 2021-04-06 DIAGNOSIS — E03.9 HYPOTHYROIDISM, UNSPECIFIED TYPE: ICD-10-CM

## 2021-04-06 PROCEDURE — 1123F ACP DISCUSS/DSCN MKR DOCD: CPT | Performed by: INTERNAL MEDICINE

## 2021-04-06 PROCEDURE — 3051F HG A1C>EQUAL 7.0%<8.0%: CPT | Performed by: INTERNAL MEDICINE

## 2021-04-06 PROCEDURE — 1090F PRES/ABSN URINE INCON ASSESS: CPT | Performed by: INTERNAL MEDICINE

## 2021-04-06 PROCEDURE — G8428 CUR MEDS NOT DOCUMENT: HCPCS | Performed by: INTERNAL MEDICINE

## 2021-04-06 PROCEDURE — 99213 OFFICE O/P EST LOW 20 MIN: CPT | Performed by: INTERNAL MEDICINE

## 2021-04-06 PROCEDURE — 4040F PNEUMOC VAC/ADMIN/RCVD: CPT | Performed by: INTERNAL MEDICINE

## 2021-04-06 RX ORDER — LEVOTHYROXINE SODIUM 0.1 MG/1
TABLET ORAL
Qty: 30 TABLET | Refills: 11 | Status: SHIPPED | OUTPATIENT
Start: 2021-04-06 | End: 2022-04-24

## 2021-04-06 NOTE — PROGRESS NOTES
2021    TELEHEALTH EVALUATION -- Audio/Visual (During ZPWXO-66 public health emergency)    Due to [place] outbreak, patient's office visit was converted to a virtual visit. Patient was contacted and agreed to proceed with a virtual visit via [email or web address] me  The risks and benefits of converting to a virtual visit were discussed in light of the current infectious disease epidemic. Patient also understood that insurance coverage and co-pays are up to their individual insurance plans. HPI: Video visit patient was at home I was at my office    Veronique Beltrán (:  1922) has requested an audio/video evaluation for the following concern(s):    Follow-up on type 2 diabetes hypothyroidism patient recently had pulmonary fracture staying at family for now reviewed thyroid function test pump currently 75 mcg of levothyroxine TSH was slightly high    For diabetes patient is on insulin using freestyle lori getting tested at least 3-4 times daily denies any severe hypoglycemia last hemoglobin A1c was 7.9    Patient on Humalog 2 times a day for glucose more than  Takes 4 units in a.m. and lunch for glucose more than 160  Blood sugars are in the mid to upper 100 range      Patient was on Lantus 6 units at bedtime currently not taking it    Results for Brianna Gundersoning (MRN [id number]) as of 2021 16:50   Ref.  Range 2021 10:50   Sodium Latest Ref Range: 135 - 144 mEq/L 145 (H)   Potassium Latest Ref Range: 3.4 - 4.9 mEq/L 4.7   Chloride Latest Ref Range: 95 - 107 mEq/L 107   CO2 Latest Ref Range: 20 - 31 mEq/L 23   BUN Latest Ref Range: 8 - 23 mg/dL 19   Creatinine Latest Ref Range: 0.50 - 0.90 mg/dL 1.09 (H)   Anion Gap Latest Ref Range: 9 - 15 mEq/L 15   GFR Non- Latest Ref Range: >60  46.3 (L)   GFR  Latest Ref Range: >60  56.0 (L)   Glucose Latest Ref Range: 70 - 99 mg/dL 115 (H)   Calcium Latest Ref Range: 8.5 - 9.9 mg/dL 8.8   Hemoglobin A1C Latest Ref Range: 4.8 - 5.9 % 7.9 (H) TSH Latest Ref Range: 0.440 - 3.860 uIU/mL 15.350 (H)   T4 Free Latest Ref Range: 0.84 - 1.68 ng/dL 1.28       Patient Active Problem List   Diagnosis    CAD (coronary artery disease)    Type 2 diabetes mellitus with diabetic polyneuropathy (HCC)    Hyperlipidemia    GERD (gastroesophageal reflux disease)    Anemia    Osteopenia    Chronic back pain    Peripheral neuropathy    Osteoarthritis    Hypothyroidism    Inflammation of eyelid    Lateral cystocele    Fall down stairs    Fuchs' corneal dystrophy    History of artificial lens replacement    Mixed incontinence    Prolapse of vaginal wall    Unsteady gait    Vitreous degeneration    Systolic hypertension    Leg swelling    Acute congestive heart failure (HCC)    NSTEMI (non-ST elevated myocardial infarction) ([facility])         Review of Systems   Constitutional: Positive for fatigue. Cardiovascular: Negative. Endocrine: Negative. Musculoskeletal: Positive for arthralgias. Recent rib fracture   All other systems reviewed and are negative. Prior to Visit Medications    Medication Sig Taking?  Authorizing Provider   simvastatin (ZOCOR) 40 MG tablet TAKE 1 TABLET BY MOUTH nightly  Ariadna Villasenor MD   potassium gluconate 550 mg tablet Take 1 tablet by mouth daily  Historical Provider, MD   furosemide (LASIX) 40 MG tablet   Historical Provider, MD   Continuous Blood Gluc Sensor (FREESTYLE SIXTO 14 DAY SENSOR) MISC Every 2 weeks  Raj Pat MD   DRUG MART UNIFINE PENTIPS 31G X 6 MM MISC use 3 TO 4 times daily AS DIRECTED  Gorge Naik MD   HUMALOG KWIKPEN 100 UNIT/ML SOPN inject 4 units in the am & lunch if glucose more than 160  Gorge Naik MD   isosorbide mononitrate (IMDUR) 30 MG extended release tablet TAKE 1 TABLET BY MOUTH DAILY  Ariadna Villasenor MD   Continuous Blood Gluc  (FREESTYLE SIXTO 14 DAY READER) KATELYNN As directed  Raj Pat MD   FREESTYLE LITE strip test 3 times daily  Raj Pat MD   levothyroxine (SYNTHROID) 75 MCG tablet TAKE 1 TABLET EVERY DAY  Gorge Naik MD   calcium carbonate (OSCAL) 500 MG TABS tablet Take 500 mg by mouth daily  Historical Provider, MD   metoprolol tartrate (LOPRESSOR) 25 MG tablet Take 25 mg by mouth 2 times daily  Historical Provider, MD   ranolazine (RANEXA) 500 MG extended release tablet Take 500 mg by mouth daily   Historical Provider, MD   Acetaminophen (TYLENOL ARTHRITIS EXT RELIEF PO) Take by mouth  Historical Provider, MD   clopidogrel (PLAVIX) 75 MG tablet TAKE 1 TABLET BY MOUTH DAILY  Historical Provider, MD   FreeStyle Lancets MISC USE AS DIRECTED to test blood sugar THREE TIMES DAILY  Gorge Silva MD   Compression Stockings MISC by Does not apply route B/l knee high compression stockings; 20-30mmhg  Aleja Liriano MD   Handicap Placard MISC by Does not apply route Good through 9/30/2024  Aleja Liriano MD   sodium chloride (LUIS FERNANDO 128) 5 % ophthalmic solution Use 1 Drop in both eyes four times daily. Historical Provider, MD   lisinopril (PRINIVIL;ZESTRIL) 5 MG tablet bid  Historical Provider, MD   amLODIPine (NORVASC) 5 MG tablet Take 1 tablet by mouth 2 times daily  Aleja Liriano MD   Fexofenadine HCl (ALLEGRA ALLERGY PO) Take by mouth  Historical Provider, MD   insulin glargine (LANTUS SOLOSTAR) 100 UNIT/ML injection pen Inject 6 units into the skin nightly  Dawna Christiansen MD   Potassium 99 MG TABS Take 1 tablet by mouth 2 times daily   Historical Provider, MD   Multiple Vitamins-Minerals (MULTIVITAL PO) Take 1 tablet by mouth daily. Historical Provider, MD   omeprazole (PRILOSEC) 20 MG capsule Take 20 mg by mouth daily. Historical Provider, MD   nitroGLYCERIN (NITROQUICK) 0.4 MG SL tablet Place 0.4 mg under the tongue every 5 minutes as needed. Historical Provider, MD   aspirin 81 MG tablet Take 81 mg by mouth daily.     Historical Provider, MD       Social History     Tobacco Use    Smoking status: Never Smoker    Smokeless tobacco: Never Used   Substance Use Topics    Alcohol use: No    Drug use: No            PHYSICAL EXAMINATION:  [ INSTRUCTIONS:  \"[x]\" Indicates a positive item  \"[]\" Indicates a negative item  -- DELETE ALL ITEMS NOT EXAMINED]  [] Alert  [] Oriented to person/place/time    [] No apparent distress  [] Toxic appearing    [] Face flushed appearing [] Sclera clear  [] Lips are cyanotic      [] Breathing appears normal  [] Appears tachypneic      [] Rash on visible skin    [] Cranial Nerves II-XII grossly intact    [] Motor grossly intact in visible upper extremities    [] Motor grossly intact in visible lower extremities    [] Normal Mood  [] Anxious appearing    [] Depressed appearing  [] Confused appearing      [] Poor short term memory  [] Poor long term memory    [] OTHER:      Due to this being a TeleHealth encounter, evaluation of the following organ systems is limited: Vitals/Constitutional/EENT/Resp/CV/GI//MS/Neuro/Skin/Heme-Lymph-Imm. ASSESSMENT/PLAN:     Diagnosis Orders   1. Uncontrolled type 2 diabetes mellitus with hyperglycemia (HCC)  Basic Metabolic Panel    Hemoglobin A1C   2. Hypothyroidism, unspecified type  T4, Free    TSH without Reflex     Orders Placed This Encounter   Procedures    Basic Metabolic Panel     Standing Status:   Future     Standing Expiration Date:   4/6/2022    Hemoglobin A1C     Standing Status:   Future     Standing Expiration Date:   4/6/2022    T4, Free     Standing Status:   Future     Standing Expiration Date:   4/6/2022    TSH without Reflex     Standing Status:   Future     Standing Expiration Date:   4/6/2022     Orders Placed This Encounter   Medications    levothyroxine (SYNTHROID) 100 MCG tablet     Sig: TAKE 1 TABLET EVERY DAY     Dispense:  30 tablet     Refill:  11     Increase levothyroxine 100  mcg daily continue Humalog twice a day for glucose more than 160  units repeat labs in 6 months months time    An  electronic signature was used to authenticate this note.     --Martha Hood MD on 4/6/2021 at 4:50 PM        Pursuant to the emergency declaration under the [facility], [facility] waiver authority and the [employer] Act, this Virtual  Visit was conducted, with patient's consent, to reduce the patient's risk of exposure to COVID-19 and provide continuity of care for an established patient. Services were provided through a video synchronous discussion virtually to substitute for in-person clinic visit.

## 2021-05-27 RX ORDER — PEN NEEDLE, DIABETIC 31 G X1/4"
NEEDLE, DISPOSABLE MISCELLANEOUS
Qty: 100 EACH | Refills: 3 | Status: SHIPPED | OUTPATIENT
Start: 2021-05-27 | End: 2021-12-21

## 2021-06-22 ENCOUNTER — NURSE TRIAGE (OUTPATIENT)
Dept: OTHER | Facility: CLINIC | Age: 86
End: 2021-06-22

## 2021-06-22 RX ORDER — INSULIN GLARGINE 100 [IU]/ML
INJECTION, SOLUTION SUBCUTANEOUS
Qty: 15 ML | Refills: 3 | OUTPATIENT
Start: 2021-06-22

## 2021-06-22 NOTE — TELEPHONE ENCOUNTER
Received call from Anabel Roberson at [facility] NANDA with [employer]. Brief description of triage: see below. Triage indicates for patient to be seen in the office after second level with Cyd Cushing, NP. Patient advised to go to the ED for evaluation if she is unable to see her pcp today. Care advice provided, patient verbalizes understanding; denies any other questions or concerns; instructed to call back for any new or worsening symptoms. Writer provided warm transfer to Claudia Zamora at [facility] for appointment scheduling. Attention Provider: Thank you for allowing me to participate in the care of your patient. The patient was connected to triage in response to information provided to the [facility]. Please do not respond through this encounter as the response is not directed to a shared pool. Reason for Disposition   Constant abdominal pain lasting > 2 hours    Answer Assessment - Initial Assessment Questions  1. LOCATION: \"Where does it hurt? \"       Across her lower abdomen. 2. RADIATION: \"Does the pain shoot anywhere else? \" (e.g., chest, back)      No     3. ONSET: \"When did the pain begin? \" (e.g., minutes, hours or days ago)       1 week ago. 4. SUDDEN: \"Gradual or sudden onset? \"      Gradual.     5. PATTERN \"Does the pain come and go, or is it constant? \"     - If constant: \"Is it getting better, staying the same, or worsening? \"       (Note: Constant means the pain never goes away completely; most serious pain is constant and it progresses)      - If intermittent: \"How long does it last?\" \"Do you have pain now? \"      (Note: Intermittent means the pain goes away completely between bouts)      Comes and goes. 6. SEVERITY: \"How bad is the pain? \"  (e.g., Scale 1-10; mild, moderate, or severe)    - MILD (1-3): doesn't interfere with normal activities, abdomen soft and not tender to touch     - MODERATE (4-7): interferes with normal activities or awakens from sleep, tender to touch - SEVERE (8-10): excruciating pain, doubled over, unable to do any normal activities       7-8    7. RECURRENT SYMPTOM: \"Have you ever had this type of abdominal pain before? \" If so, ask: \"When was the last time? \" and \"What happened that time? \"       No     8. CAUSE: \"What do you think is causing the abdominal pain? \"      No     9. RELIEVING/AGGRAVATING FACTORS: \"What makes it better or worse? \" (e.g., movement, antacids, bowel movement)      Tylenol improves the pain. 10. OTHER SYMPTOMS: \"Has there been any vomiting, diarrhea, constipation, or urine problems? \"        Vomiting on Sunday night. 11. PREGNANCY: \"Is there any chance you are pregnant? \" \"When was your last menstrual period? \"          No menopause    Protocols used: ABDOMINAL PAIN - FEMALE-ADULT-[place]

## 2021-06-23 ENCOUNTER — HOSPITAL ENCOUNTER (OUTPATIENT)
Dept: LAB | Age: 86
Discharge: HOME | End: 2021-06-23
Payer: MEDICARE

## 2021-06-23 ENCOUNTER — HOSPITAL ENCOUNTER (OUTPATIENT)
Age: 86
Discharge: HOME | End: 2021-06-25
Payer: MEDICARE

## 2021-06-23 ENCOUNTER — OFFICE VISIT (OUTPATIENT)
Dept: FAMILY MEDICINE CLINIC | Age: 86
End: 2021-06-23
Payer: MEDICARE

## 2021-06-23 ENCOUNTER — HOSPITAL ENCOUNTER (OUTPATIENT)
Dept: GENERAL RADIOLOGY | Age: 86
Discharge: HOME | End: 2021-06-25
Payer: MEDICARE

## 2021-06-23 ENCOUNTER — TELEPHONE (OUTPATIENT)
Dept: FAMILY MEDICINE CLINIC | Age: 86
End: 2021-06-23

## 2021-06-23 ENCOUNTER — HOSPITAL ENCOUNTER (OUTPATIENT)
Dept: CT IMAGING | Age: 86
Discharge: HOME | End: 2021-06-25
Payer: MEDICARE

## 2021-06-23 VITALS
TEMPERATURE: 97.2 F | HEART RATE: 77 BPM | DIASTOLIC BLOOD PRESSURE: 60 MMHG | SYSTOLIC BLOOD PRESSURE: 100 MMHG | WEIGHT: 106 LBS | BODY MASS INDEX: 24.53 KG/M2 | OXYGEN SATURATION: 97 % | HEIGHT: 55 IN

## 2021-06-23 DIAGNOSIS — I50.9 ACUTE CONGESTIVE HEART FAILURE, UNSPECIFIED HEART FAILURE TYPE (HCC): ICD-10-CM

## 2021-06-23 DIAGNOSIS — R06.09 DOE (DYSPNEA ON EXERTION): ICD-10-CM

## 2021-06-23 DIAGNOSIS — R10.32 LLQ ABDOMINAL PAIN: Primary | ICD-10-CM

## 2021-06-23 DIAGNOSIS — R10.32 LLQ ABDOMINAL PAIN: ICD-10-CM

## 2021-06-23 LAB
ALBUMIN SERPL-MCNC: 3.7 G/DL (ref 3.5–4.6)
ALP BLD-CCNC: 79 U/L (ref 40–130)
ALT SERPL-CCNC: <5 U/L (ref 0–33)
ANION GAP SERPL CALCULATED.3IONS-SCNC: 11 MEQ/L (ref 9–15)
AST SERPL-CCNC: 15 U/L (ref 0–35)
BASOPHILS ABSOLUTE: 0 K/UL (ref 0–0.2)
BASOPHILS RELATIVE PERCENT: 0.5 %
BILIRUB SERPL-MCNC: 0.3 MG/DL (ref 0.2–0.7)
BILIRUBIN, POC: ABNORMAL
BLOOD URINE, POC: ABNORMAL
BUN BLDV-MCNC: 18 MG/DL (ref 8–23)
CALCIUM SERPL-MCNC: 9.5 MG/DL (ref 8.5–9.9)
CHLORIDE BLD-SCNC: 106 MEQ/L (ref 95–107)
CLARITY, POC: CLEAR
CO2: 25 MEQ/L (ref 20–31)
COLOR, POC: YELLOW
CREAT SERPL-MCNC: 0.87 MG/DL (ref 0.5–0.9)
EOSINOPHILS ABSOLUTE: 0.2 K/UL (ref 0–0.7)
EOSINOPHILS RELATIVE PERCENT: 1.6 %
GFR AFRICAN AMERICAN: >60
GFR NON-AFRICAN AMERICAN: >60
GLOBULIN: 4 G/DL (ref 2.3–3.5)
GLUCOSE BLD-MCNC: 156 MG/DL (ref 70–99)
GLUCOSE URINE, POC: ABNORMAL
HCT VFR BLD CALC: 33.4 % (ref 37–47)
HEMOGLOBIN: 11 G/DL (ref 12–16)
KETONES, POC: ABNORMAL
LEUKOCYTE EST, POC: ABNORMAL
LYMPHOCYTES ABSOLUTE: 2.8 K/UL (ref 1–4.8)
LYMPHOCYTES RELATIVE PERCENT: 29 %
MCH RBC QN AUTO: 30.3 PG (ref 27–31.3)
MCHC RBC AUTO-ENTMCNC: 32.9 % (ref 33–37)
MCV RBC AUTO: 92 FL (ref 82–100)
MONOCYTES ABSOLUTE: 0.8 K/UL (ref 0.2–0.8)
MONOCYTES RELATIVE PERCENT: 8.2 %
NEUTROPHILS ABSOLUTE: 5.8 K/UL (ref 1.4–6.5)
NEUTROPHILS RELATIVE PERCENT: 60.7 %
NITRITE, POC: ABNORMAL
PDW BLD-RTO: 14.3 % (ref 11.5–14.5)
PH, POC: 6
PLATELET # BLD: 230 K/UL (ref 130–400)
POTASSIUM SERPL-SCNC: 3.9 MEQ/L (ref 3.4–4.9)
PROTEIN, POC: ABNORMAL
RBC # BLD: 3.63 M/UL (ref 4.2–5.4)
SODIUM BLD-SCNC: 142 MEQ/L (ref 135–144)
SPECIFIC GRAVITY, POC: 1.01
TOTAL PROTEIN: 7.7 G/DL (ref 6.3–8)
UROBILINOGEN, POC: ABNORMAL
WBC # BLD: 9.6 K/UL (ref 4.8–10.8)

## 2021-06-23 PROCEDURE — G8427 DOCREV CUR MEDS BY ELIG CLIN: HCPCS | Performed by: NURSE PRACTITIONER

## 2021-06-23 PROCEDURE — 85025 COMPLETE CBC W/AUTO DIFF WBC: CPT

## 2021-06-23 PROCEDURE — 1123F ACP DISCUSS/DSCN MKR DOCD: CPT | Performed by: NURSE PRACTITIONER

## 2021-06-23 PROCEDURE — 80053 COMPREHEN METABOLIC PANEL: CPT

## 2021-06-23 PROCEDURE — 81002 URINALYSIS NONAUTO W/O SCOPE: CPT | Performed by: NURSE PRACTITIONER

## 2021-06-23 PROCEDURE — 1036F TOBACCO NON-USER: CPT | Performed by: NURSE PRACTITIONER

## 2021-06-23 PROCEDURE — 36415 COLL VENOUS BLD VENIPUNCTURE: CPT

## 2021-06-23 PROCEDURE — 99214 OFFICE O/P EST MOD 30 MIN: CPT | Performed by: NURSE PRACTITIONER

## 2021-06-23 PROCEDURE — 74176 CT ABD & PELVIS W/O CONTRAST: CPT

## 2021-06-23 PROCEDURE — G8420 CALC BMI NORM PARAMETERS: HCPCS | Performed by: NURSE PRACTITIONER

## 2021-06-23 PROCEDURE — 71046 X-RAY EXAM CHEST 2 VIEWS: CPT

## 2021-06-23 PROCEDURE — 1090F PRES/ABSN URINE INCON ASSESS: CPT | Performed by: NURSE PRACTITIONER

## 2021-06-23 PROCEDURE — 4040F PNEUMOC VAC/ADMIN/RCVD: CPT | Performed by: NURSE PRACTITIONER

## 2021-06-23 ASSESSMENT — ENCOUNTER SYMPTOMS
BELCHING: 0
FLATUS: 0
NAUSEA: 1
DIARRHEA: 0
CONSTIPATION: 1
ABDOMINAL PAIN: 1
BLOOD IN STOOL: 0
WHEEZING: 1
COUGH: 1
VOMITING: 1
SHORTNESS OF BREATH: 1

## 2021-06-23 ASSESSMENT — CROHNS DISEASE ACTIVITY INDEX (CDAI): CDAI SCORE: 0

## 2021-06-23 NOTE — TELEPHONE ENCOUNTER
GOYOM for a return call. Fay ordered an XRAY also for her to have done today along with labs and CT.

## 2021-06-23 NOTE — PROGRESS NOTES
[address] [facility] [address] [facility] PR[facility]  [address] [place] [id number]  Dept: [phone]  Dept Fax: [phone]  Loc: [phone]     Sophia Cruz 80 y.o. female presents 6/23/21 with   Chief Complaint   Patient presents with    Pain     abdomen, left side of lower back, x1 week, no BM since Sunday, no appetite       Abdominal Pain  This is a new problem. The current episode started in the past 7 days. The problem has been waxing and waning. The pain is located in the LLQ. The abdominal pain radiates to the LLQ and back. Associated symptoms include constipation, frequency (also drank more water), nausea and vomiting (one time on Sunday before bed). Pertinent negatives include no belching, diarrhea, dysuria, fever, flatus or hematuria. Nothing aggravates the pain. The pain is relieved by nothing. She has tried acetaminophen for the symptoms. The treatment provided mild relief. Her past medical history is significant for abdominal surgery. There is no history of Crohn's disease.  appy, mary      Pain was present for a long time yesterday without subsiding    Has not had a BM since Sunday, daughter states it was a really large BM, no blood, also has not been eating as much due to not having an appetite, has only wanted to eat toast      Usually has BMs once a day     Daughter also concerned because she states her mother has been SOB and wheezing lately, states she has a history of CHF, would like her lungs checked      Reviewed the following history:    Past Medical History:   Diagnosis Date    Anemia     Arthritis     Bleeding from breast 2003    R breast treated by dr Aleida Grijalva CAD (coronary artery disease)     Cancer ([facility] [address]     melanoma and carcinoma    Carpal tunnel syndrome     Chronic back pain     Dupuytren's contracture of hand 2009    Left hand treated by dr Mabel Leigh GERD (gastroesophageal reflux disease)     History of mammogram 7/2011    WNL    Hyperlipidemia     Hypertension     Obesity     Osteopenia     hips    Peripheral neuropathy     Presbyesophagus 2009    esophogram 3/19/2009    Shingles [id number]    Shingles     Type II or unspecified type diabetes mellitus without mention of complication, not stated as uncontrolled      Past Surgical History:   Procedure Laterality Date    APPENDECTOMY      CARDIAC CATHETERIZATION  04/06/2004    CARDIAC CATHETERIZATION  10/01/2002    CHOLECYSTECTOMY      COLONOSCOPY  2003    rectal hemangiomas, and colon polyps    COLONOSCOPY  04/29/2010    CYSTOSCOPY  2010    LARYNGOSCOPY  2008    MALIGNANT SKIN LESION EXCISION  2001   Maame Swan OTHER SURGICAL HISTORY  2012    right arm, several sutures applied from injury    PTCA  2002 no stents, 2005 two stents, 2004 two stents    UPPER GASTROINTESTINAL ENDOSCOPY  2003     Family History   Problem Relation Age of Onset    Ovarian Cancer Sister     Heart Attack Sister        Allergies   Allergen Reactions    Cimetidine Diarrhea    Codeine Hives     Listed as getting hives from codeine but has repeatedly tolerated Vicodan and percocet without rash or allergy 2/16/12 HES    Colesevelam Diarrhea    Glucophage [Metformin Hydrochloride] Diarrhea    Lisinopril Other (See Comments)     Makes pt cough    Metformin Diarrhea    Other      Other reaction(s):  Intolerance  Naprolen    Oxycodone Swelling    Percocet [Oxycodone-Acetaminophen] Swelling     ankles    Sulfa Antibiotics     Welchol [Colesevelam Hcl] Diarrhea    Nifedipine Nausea And Vomiting    Pregabalin Other (See Comments) and Rash     Sleep deprivation   Sleep deprivation     Ranitidine Hcl Nausea And Vomiting    Sulfamethopyrazine Nausea And Vomiting       Current Outpatient Medications on File Prior to Visit   Medication Sig Dispense Refill    diphenhydrAMINE HCl (BENADRYL ALLERGY PO) Take by mouth      levothyroxine (SYNTHROID) 100 MCG tablet TAKE 1 TABLET EVERY DAY 30 tablet 11    simvastatin (ZOCOR) 40 MG tablet TAKE 1 TABLET BY MOUTH nightly 30 tablet 11    potassium gluconate 550 mg tablet Take 1 tablet by mouth daily      furosemide (LASIX) 40 MG tablet       Continuous Blood Gluc Sensor (FREESTYLE SIXTO 14 DAY SENSOR) MISC Every 2 weeks 2 each 5    HUMALOG KWIKPEN 100 UNIT/ML SOPN inject 4 units in the am & lunch if glucose more than 160 15 mL 3    isosorbide mononitrate (IMDUR) 30 MG extended release tablet TAKE 1 TABLET BY MOUTH DAILY 30 tablet 11    Continuous Blood Gluc  (FREESTYLE SIXTO 14 DAY READER) KATELYNN As directed 1 Device 0    calcium carbonate (OSCAL) 500 MG TABS tablet Take 500 mg by mouth daily      metoprolol tartrate (LOPRESSOR) 25 MG tablet Take 25 mg by mouth 2 times daily      Acetaminophen (TYLENOL ARTHRITIS EXT RELIEF PO) Take by mouth      clopidogrel (PLAVIX) 75 MG tablet TAKE 1 TABLET BY MOUTH DAILY      sodium chloride (LUIS FERNANDO 128) 5 % ophthalmic solution Use 1 Drop in both eyes four times daily.  lisinopril (PRINIVIL;ZESTRIL) 5 MG tablet bid  3    amLODIPine (NORVASC) 5 MG tablet Take 1 tablet by mouth 2 times daily 60 tablet 3    insulin glargine (LANTUS SOLOSTAR) 100 UNIT/ML injection pen Inject 6 units into the skin nightly 5 Pen 3    Multiple Vitamins-Minerals (MULTIVITAL PO) Take 1 tablet by mouth daily.  omeprazole (PRILOSEC) 20 MG capsule Take 20 mg by mouth daily.  nitroGLYCERIN (NITROQUICK) 0.4 MG SL tablet Place 0.4 mg under the tongue every 5 minutes as needed.  aspirin 81 MG tablet Take 81 mg by mouth daily.         DRUG MART UNIFINE PENTIPS 31G X 6 MM MISC use 3 TO 4 times daily AS DIRECTED 100 each 3    FREESTYLE LITE strip test 3 times daily 100 strip 6    ranolazine (RANEXA) 500 MG extended release tablet Take 500 mg by mouth daily  (Patient not taking: Reported on 6/23/2021)      FreeStyle Lancets MISC USE AS DIRECTED to test blood sugar THREE TIMES DAILY 100 each 2    normal.      Palpations: Abdomen is soft. Tenderness: There is abdominal tenderness in the left lower quadrant. There is no guarding or rebound. Negative signs include Todd's sign and McBurney's sign. Musculoskeletal:      Cervical back: Normal range of motion and neck supple. Lumbar back: Tenderness present. Skin:     General: Skin is warm and dry. Neurological:      General: No focal deficit present. Mental Status: She is alert and oriented to person, place, and time. Psychiatric:         Attention and Perception: Attention normal.         Mood and Affect: Mood normal.         Speech: Speech normal.         Behavior: Behavior normal.         Thought Content: Thought content normal.       Assessment and Plan    Veronique Beltrán 80 y.o. female presenting for abdominal pain, SOB, wheezing     1. LLQ abdominal pain  - POCT Urinalysis no Micro- patient unable to give urine sample today, will obtain at home and bring back  - CT ABDOMEN PELVIS WO CONTRAST Additional Contrast? Radiologist Recommendation; Future  - Comprehensive Metabolic Panel; Future  - CBC With Auto Differential; Future    2. Acute congestive heart failure, unspecified heart failure type ([facility] [address]  - Follows with Dr. David Virk, last appt two weeks ago    3. MORGAN (dyspnea on exertion)  - XR CHEST STANDARD (2 VW); Future    Daughter was reluctant to take patient to ED, advised ED for ANY worsening of symptoms, daughter verbalized understanding. Return if symptoms worsen or fail to improve, for follow up. Side effects and adverse effects of any medication prescribed today, as well as treatment plan/rationale, follow-up care, and result expectations have been discussed with the patient. Expresses understanding and desires to proceed with treatment plan. Discussed signs and symptoms which require immediate follow-up in ED/call to 911. Understanding verbalized.     I have reviewed and updated the electronic medical

## 2021-06-29 ENCOUNTER — TELEPHONE (OUTPATIENT)
Dept: FAMILY MEDICINE CLINIC | Age: 86
End: 2021-06-29

## 2021-06-29 DIAGNOSIS — M19.91 PRIMARY OSTEOARTHRITIS, UNSPECIFIED SITE: ICD-10-CM

## 2021-06-29 DIAGNOSIS — R26.81 UNSTEADY GAIT: ICD-10-CM

## 2021-06-29 DIAGNOSIS — G89.29 CHRONIC BILATERAL LOW BACK PAIN, UNSPECIFIED WHETHER SCIATICA PRESENT: Primary | ICD-10-CM

## 2021-06-29 DIAGNOSIS — M54.50 CHRONIC BILATERAL LOW BACK PAIN, UNSPECIFIED WHETHER SCIATICA PRESENT: Primary | ICD-10-CM

## 2021-06-29 NOTE — TELEPHONE ENCOUNTER
Patients daughter called asking for a handicap placard script. Once placed she would like to pick it up.

## 2021-07-01 RX ORDER — INSULIN GLARGINE 100 [IU]/ML
INJECTION, SOLUTION SUBCUTANEOUS
Qty: 5 PEN | Refills: 3 | Status: SHIPPED | OUTPATIENT
Start: 2021-07-01 | End: 2021-12-30

## 2021-08-05 RX ORDER — ISOSORBIDE MONONITRATE 30 MG/1
TABLET, EXTENDED RELEASE ORAL
Qty: 30 TABLET | Refills: 11 | Status: SHIPPED | OUTPATIENT
Start: 2021-08-05 | End: 2022-08-15

## 2021-08-05 NOTE — TELEPHONE ENCOUNTER
Rx requested:  Requested Prescriptions     Pending Prescriptions Disp Refills    isosorbide mononitrate (IMDUR) 30 MG extended release tablet [Pharmacy Med Name: isosorbide mononitrate ER 30 mg tablet,extended release 24 hr] 30 tablet 11     Sig: TAKE 1 TABLET BY MOUTH DAILY       Last Office Visit:   3/18/2021      Last filled:  8/12/2020    Next Visit Date:  Future Appointments   Date Time Provider Ed Fried   1/27/2022 11:00 AM Aaliyah Hamlin MD [address] [place] [address]

## 2021-08-10 ENCOUNTER — OFFICE VISIT (OUTPATIENT)
Dept: FAMILY MEDICINE CLINIC | Age: 86
End: 2021-08-10
Payer: MEDICARE

## 2021-08-10 VITALS
SYSTOLIC BLOOD PRESSURE: 108 MMHG | HEART RATE: 70 BPM | DIASTOLIC BLOOD PRESSURE: 60 MMHG | TEMPERATURE: 97 F | OXYGEN SATURATION: 96 %

## 2021-08-10 DIAGNOSIS — L98.9 SKIN LESION: Primary | ICD-10-CM

## 2021-08-10 DIAGNOSIS — L29.9 ITCHING: ICD-10-CM

## 2021-08-10 PROCEDURE — G8427 DOCREV CUR MEDS BY ELIG CLIN: HCPCS | Performed by: FAMILY MEDICINE

## 2021-08-10 PROCEDURE — G8420 CALC BMI NORM PARAMETERS: HCPCS | Performed by: FAMILY MEDICINE

## 2021-08-10 PROCEDURE — 1036F TOBACCO NON-USER: CPT | Performed by: FAMILY MEDICINE

## 2021-08-10 PROCEDURE — 1090F PRES/ABSN URINE INCON ASSESS: CPT | Performed by: FAMILY MEDICINE

## 2021-08-10 PROCEDURE — 4040F PNEUMOC VAC/ADMIN/RCVD: CPT | Performed by: FAMILY MEDICINE

## 2021-08-10 PROCEDURE — 99213 OFFICE O/P EST LOW 20 MIN: CPT | Performed by: FAMILY MEDICINE

## 2021-08-10 PROCEDURE — 1123F ACP DISCUSS/DSCN MKR DOCD: CPT | Performed by: FAMILY MEDICINE

## 2021-08-10 RX ORDER — HYDROXYZINE HYDROCHLORIDE 25 MG/1
25 TABLET, FILM COATED ORAL EVERY 8 HOURS PRN
Qty: 30 TABLET | Refills: 0 | Status: SHIPPED | OUTPATIENT
Start: 2021-08-10 | End: 2021-08-13

## 2021-08-10 ASSESSMENT — ENCOUNTER SYMPTOMS
SORE THROAT: 0
CONSTIPATION: 0
COUGH: 0
DIARRHEA: 0
ABDOMINAL PAIN: 0
RHINORRHEA: 0
WHEEZING: 0
SHORTNESS OF BREATH: 0

## 2021-08-10 NOTE — PROGRESS NOTES
[address] [facility] [address] [facility] PRIMARY CARE  Alfred[address] [place] [address]  Dept: [phone]  Dept Fax: [phone]  Loc: [phone]     Chief Complaint  Chief Complaint   Patient presents with    Pruritis     x 1 year, back, stomach     Swelling     x 3 days swelling in right calf     Eye Problem     feels like something is stuck in her right eye        HPI:  80 y. o.female who presents for the following:      Rash: all over body itching intermittent over the past year; bothering her much lately; trying benadryl and topical steroid otc without relief. Mole: has a dark mole on the back; wants to see derm for this. R eye problem: sometimes hurts at times. Not bothering her today; sometimes itches; this has bothered her in the past but no one was able to figure out what was wrong. Review of Systems   Constitutional: Negative for chills and fever. HENT: Negative for congestion, rhinorrhea and sore throat. Respiratory: Negative for cough, shortness of breath and wheezing. Gastrointestinal: Negative for abdominal pain, constipation and diarrhea. Endocrine: Negative for polydipsia and polyuria. Genitourinary: Negative for dysuria, frequency and urgency. Neurological: Negative for syncope, light-headedness, numbness and headaches. Psychiatric/Behavioral: Negative for sleep disturbance. The patient is not nervous/anxious.         Past Medical History:   Diagnosis Date    Anemia     Arthritis     Bleeding from breast 2003    R breast treated by dr Jess Nielsen CAD (coronary artery disease)     Cancer ([facility] [address]     melanoma and carcinoma    Carpal tunnel syndrome     Chronic back pain     Dupuytren's contracture of hand 2009    Left hand treated by dr Bailey Guerrero GERD (gastroesophageal reflux disease)     History of mammogram 7/2011    WNL    Hyperlipidemia     Hypertension     Obesity     Osteopenia     hips    Peripheral neuropathy     Presbyesophagus 2009    esophogram 3/19/2009    Shingles [id number]    Shingles     Type II or unspecified type diabetes mellitus without mention of complication, not stated as uncontrolled      Past Surgical History:   Procedure Laterality Date    APPENDECTOMY      CARDIAC CATHETERIZATION  04/06/2004    CARDIAC CATHETERIZATION  10/01/2002    CHOLECYSTECTOMY      COLONOSCOPY  2003    rectal hemangiomas, and colon polyps    COLONOSCOPY  04/29/2010    CYSTOSCOPY  2010    LARYNGOSCOPY  2008    MALIGNANT SKIN LESION EXCISION  2001    OTHER SURGICAL HISTORY  2012    right arm, several sutures applied from injury    PTCA  2002 no stents, 2005 two stents, 2004 two stents    UPPER GASTROINTESTINAL ENDOSCOPY  2003     Social History     Socioeconomic History    Marital status:      Spouse name: Not on file    Number of children: Not on file    Years of education: Not on file    Highest education level: Not on file   Occupational History    Not on file   Tobacco Use    Smoking status: Never Smoker    Smokeless tobacco: Never Used   Substance and Sexual Activity    Alcohol use: No    Drug use: No    Sexual activity: Never   Other Topics Concern    Not on file   Social History Narrative    Not on file     Social Determinants of Health     Financial Resource Strain: Low Risk     Difficulty of Paying Living Expenses: Not hard at all   Food Insecurity: No Food Insecurity    Worried About [facility] in the Last Year: Never true    [facility] in the Last Year: Never true   Transportation Needs: No Transportation Needs    Lack of Transportation (Medical): No    Lack of Transportation (Non-Medical):  No   Physical Activity:     Days of Exercise per Week:     Minutes of Exercise per Session:    Stress:     Feeling of Stress :    Social Connections:     Frequency of Communication with Friends and Family:     Frequency of Social Gatherings with Friends and Family:  Attends Faith Services:     Active Member of Clubs or Organizations:     Attends Club or Organization Meetings:     Marital Status:    Intimate Partner Violence:     Fear of Current or Ex-Partner:     Emotionally Abused:     Physically Abused:     Sexually Abused:      Family History   Problem Relation Age of Onset    Ovarian Cancer Sister     Heart Attack Sister       Allergies   Allergen Reactions    Cimetidine Diarrhea    Codeine Hives     Listed as getting hives from codeine but has repeatedly tolerated Vicodan and percocet without rash or allergy 2/16/12 HES    Colesevelam Diarrhea    Glucophage [Metformin Hydrochloride] Diarrhea    Lisinopril Other (See Comments)     Makes pt cough    Metformin Diarrhea    Other      Other reaction(s):  Intolerance  Naprolen    Oxycodone Swelling    Percocet [Oxycodone-Acetaminophen] Swelling     ankles    Sulfa Antibiotics     Welchol [Colesevelam Hcl] Diarrhea    Nifedipine Nausea And Vomiting    Pregabalin Other (See Comments) and Rash     Sleep deprivation   Sleep deprivation     Ranitidine Hcl Nausea And Vomiting    Sulfamethopyrazine Nausea And Vomiting     Current Outpatient Medications   Medication Sig Dispense Refill    hydrOXYzine (ATARAX) 25 MG tablet Take 1 tablet by mouth every 8 hours as needed for Itching 30 tablet 0    isosorbide mononitrate (IMDUR) 30 MG extended release tablet TAKE 1 TABLET BY MOUTH DAILY 30 tablet 11    insulin glargine (LANTUS SOLOSTAR) 100 UNIT/ML injection pen Inject 6 units into the skin nightly 5 pen 3    Handicap Placard MISC by Does not apply route Good through 7/1/2026 1 each 0    diphenhydrAMINE HCl (BENADRYL ALLERGY PO) Take by mouth      DRUG MART UNIFINE PENTIPS 31G X 6 MM MISC use 3 TO 4 times daily AS DIRECTED 100 each 3    levothyroxine (SYNTHROID) 100 MCG tablet TAKE 1 TABLET EVERY DAY 30 tablet 11    simvastatin (ZOCOR) 40 MG tablet TAKE 1 TABLET BY MOUTH nightly 30 tablet 11    potassium gluconate 550 mg tablet Take 1 tablet by mouth daily      furosemide (LASIX) 40 MG tablet       Continuous Blood Gluc Sensor (FREESTYLE SIXTO 14 DAY SENSOR) MISC Every 2 weeks 2 each 5    HUMALOG KWIKPEN 100 UNIT/ML SOPN inject 4 units in the am & lunch if glucose more than 160 15 mL 3    Continuous Blood Gluc  (FREESTYLE SIXTO 14 DAY READER) KATELYNN As directed 1 Device 0    FREESTYLE LITE strip test 3 times daily 100 strip 6    calcium carbonate (OSCAL) 500 MG TABS tablet Take 500 mg by mouth daily      metoprolol tartrate (LOPRESSOR) 25 MG tablet Take 25 mg by mouth 2 times daily      ranolazine (RANEXA) 500 MG extended release tablet Take 500 mg by mouth daily       Acetaminophen (TYLENOL ARTHRITIS EXT RELIEF PO) Take by mouth      clopidogrel (PLAVIX) 75 MG tablet TAKE 1 TABLET BY MOUTH DAILY      FreeStyle Lancets MISC USE AS DIRECTED to test blood sugar THREE TIMES DAILY 100 each 2    Compression Stockings MISC by Does not apply route B/l knee high compression stockings; 20-30mmhg 1 each 0    Handicap Placard MISC by Does not apply route Good through 9/30/2024 1 each 0    sodium chloride (LUIS FERNANDO 128) 5 % ophthalmic solution Use 1 Drop in both eyes four times daily.  lisinopril (PRINIVIL;ZESTRIL) 5 MG tablet bid  3    amLODIPine (NORVASC) 5 MG tablet Take 1 tablet by mouth 2 times daily 60 tablet 3    Fexofenadine HCl (ALLEGRA ALLERGY PO) Take by mouth       Potassium 99 MG TABS Take 1 tablet by mouth 2 times daily       Multiple Vitamins-Minerals (MULTIVITAL PO) Take 1 tablet by mouth daily.  omeprazole (PRILOSEC) 20 MG capsule Take 20 mg by mouth daily.  nitroGLYCERIN (NITROQUICK) 0.4 MG SL tablet Place 0.4 mg under the tongue every 5 minutes as needed.  aspirin 81 MG tablet Take 81 mg by mouth daily. No current facility-administered medications for this visit.          Vitals:    08/10/21 1302   BP: 108/60   Site: Left Upper Arm   Position: Sitting   Cuff Size: Small Adult   Pulse: 70   Temp: 97 °F (36.1 °C)   TempSrc: Infrared   SpO2: 96%       Physical exam:  Physical Exam  Vitals reviewed. Constitutional:       General: She is not in acute distress. Appearance: She is well-developed. HENT:      Head: Normocephalic and atraumatic. Cardiovascular:      Rate and Rhythm: Normal rate. Pulmonary:      Effort: Pulmonary effort is normal. No respiratory distress. Musculoskeletal:        Arms:       Cervical back: Normal range of motion. Skin:     General: Skin is warm and dry. Neurological:      Mental Status: She is alert and oriented to person, place, and time. Psychiatric:         Behavior: Behavior normal.         Assessment/Plan:  80 y.o. female here mainly for Skin problem:  - Back mole: likely seborrheic keratosis; sending to derm; she has had a number of lesions removed  - Itching: no rash; trial of hydroxyzine     Diagnosis Orders   1. Skin lesion  Amb External Referral To Dermatology   2. Itching  Amb External Referral To Dermatology    hydrOXYzine (ATARAX) 25 MG tablet        Return if symptoms worsen or fail to improve.     Lucero Osborne MD

## 2021-08-12 ENCOUNTER — HOSPITAL ENCOUNTER (EMERGENCY)
Age: 86
Discharge: HOME | End: 2021-08-12
Attending: EMERGENCY MEDICINE
Payer: MEDICARE

## 2021-08-12 ENCOUNTER — APPOINTMENT (OUTPATIENT)
Dept: CT IMAGING | Age: 86
End: 2021-08-12
Payer: MEDICARE

## 2021-08-12 ENCOUNTER — APPOINTMENT (OUTPATIENT)
Dept: GENERAL RADIOLOGY | Age: 86
End: 2021-08-12
Payer: MEDICARE

## 2021-08-12 VITALS
BODY MASS INDEX: 24.53 KG/M2 | OXYGEN SATURATION: 95 % | DIASTOLIC BLOOD PRESSURE: 57 MMHG | HEIGHT: 55 IN | HEART RATE: 64 BPM | TEMPERATURE: 96.9 F | SYSTOLIC BLOOD PRESSURE: 125 MMHG | WEIGHT: 106 LBS | RESPIRATION RATE: 18 BRPM

## 2021-08-12 DIAGNOSIS — S09.90XA CLOSED HEAD INJURY, INITIAL ENCOUNTER: ICD-10-CM
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
## 2021-08-12 LAB
ALBUMIN SERPL-MCNC: 3.6 G/DL (ref 3.5–4.6)
ALP BLD-CCNC: 63 U/L (ref 40–130)
ALT SERPL-CCNC: 6 U/L (ref 0–33)
ANION GAP SERPL CALCULATED.3IONS-SCNC: 12 MEQ/L (ref 9–15)
AST SERPL-CCNC: 17 U/L (ref 0–35)
BASOPHILS ABSOLUTE: 0 K/UL (ref 0–0.1)
BASOPHILS RELATIVE PERCENT: 0.4 % (ref 0.1–1.2)
BILIRUB SERPL-MCNC: 0.3 MG/DL (ref 0.2–0.7)
BUN BLDV-MCNC: 26 MG/DL (ref 8–23)
CALCIUM SERPL-MCNC: 9.6 MG/DL (ref 8.5–9.9)
CHLORIDE BLD-SCNC: 102 MEQ/L (ref 95–107)
CO2: 27 MEQ/L (ref 20–31)
CREAT SERPL-MCNC: 1.12 MG/DL (ref 0.5–0.9)
EOSINOPHILS ABSOLUTE: 0.3 K/UL (ref 0–0.4)
EOSINOPHILS RELATIVE PERCENT: 3.1 % (ref 0.7–5.8)
GFR AFRICAN AMERICAN: 54.2
GFR NON-AFRICAN AMERICAN: 44.8
GLOBULIN: 3.9 G/DL (ref 2.3–3.5)
GLUCOSE BLD-MCNC: 314 MG/DL (ref 70–99)
HCT VFR BLD CALC: 35 % (ref 37–47)
HEMOGLOBIN: 10.9 G/DL (ref 11.2–15.7)
IMMATURE GRANULOCYTES #: 0 K/UL
IMMATURE GRANULOCYTES %: 0.3 %
LYMPHOCYTES ABSOLUTE: 3 K/UL (ref 1.2–3.7)
LYMPHOCYTES RELATIVE PERCENT: 32.8 %
MCH RBC QN AUTO: 29.1 PG (ref 25.6–32.2)
MCHC RBC AUTO-ENTMCNC: 31.1 % (ref 32.2–35.5)
MCV RBC AUTO: 93.3 FL (ref 79.4–94.8)
MONOCYTES ABSOLUTE: 0.7 K/UL (ref 0.2–0.9)
MONOCYTES RELATIVE PERCENT: 7.8 % (ref 4.7–12.5)
NEUTROPHILS ABSOLUTE: 5 K/UL (ref 1.6–6.1)
NEUTROPHILS RELATIVE PERCENT: 55.6 % (ref 34–71.1)
PDW BLD-RTO: 15 % (ref 11.7–14.4)
PLATELET # BLD: 208 K/UL (ref 182–369)
POTASSIUM SERPL-SCNC: 4.5 MEQ/L (ref 3.4–4.9)
RBC # BLD: 3.75 M/UL (ref 3.93–5.22)
SARS-COV-2, NAAT: NOT DETECTED
SODIUM BLD-SCNC: 141 MEQ/L (ref 135–144)
TOTAL PROTEIN: 7.5 G/DL (ref 6.3–8)
WBC # BLD: 9.1 K/UL (ref 4–10)

## 2021-08-12 PROCEDURE — 80053 COMPREHEN METABOLIC PANEL: CPT

## 2021-08-12 PROCEDURE — 87635 SARS-COV-2 COVID-19 AMP PRB: CPT

## 2021-08-12 PROCEDURE — 73030 X-RAY EXAM OF SHOULDER: CPT

## 2021-08-12 PROCEDURE — 70450 CT HEAD/BRAIN W/O DYE: CPT

## 2021-08-12 PROCEDURE — 6370000000 HC RX 637 (ALT 250 FOR IP): Performed by: EMERGENCY MEDICINE

## 2021-08-12 PROCEDURE — 85025 COMPLETE CBC W/AUTO DIFF WBC: CPT

## 2021-08-12 PROCEDURE — 99283 EMERGENCY DEPT VISIT LOW MDM: CPT

## 2021-08-12 PROCEDURE — 36415 COLL VENOUS BLD VENIPUNCTURE: CPT

## 2021-08-12 RX ORDER — OXYCODONE HYDROCHLORIDE AND ACETAMINOPHEN 5; 325 MG/1; MG/1
1 TABLET ORAL EVERY 6 HOURS PRN
Qty: 10 TABLET | Refills: 0 | Status: SHIPPED | OUTPATIENT
Start: 2021-08-12 | End: 2021-08-15

## 2021-08-12 RX ORDER — OXYCODONE HYDROCHLORIDE AND ACETAMINOPHEN 5; 325 MG/1; MG/1
1 TABLET ORAL ONCE
Status: COMPLETED | OUTPATIENT
Start: 2021-08-12 | End: 2021-08-12

## 2021-08-12 RX ADMIN — OXYCODONE HYDROCHLORIDE AND ACETAMINOPHEN 1 TABLET: 5; 325 TABLET ORAL at 20:59

## 2021-08-12 ASSESSMENT — PAIN DESCRIPTION - FREQUENCY: FREQUENCY: CONTINUOUS

## 2021-08-12 ASSESSMENT — PAIN DESCRIPTION - PAIN TYPE: TYPE: ACUTE PAIN

## 2021-08-12 ASSESSMENT — PAIN SCALES - GENERAL
PAINLEVEL_OUTOF10: 7
PAINLEVEL_OUTOF10: 7

## 2021-08-12 ASSESSMENT — PAIN DESCRIPTION - DESCRIPTORS: DESCRIPTORS: ACHING;SHARP

## 2021-08-12 ASSESSMENT — PAIN DESCRIPTION - ORIENTATION: ORIENTATION: RIGHT;LEFT

## 2021-08-12 ASSESSMENT — PAIN DESCRIPTION - ONSET: ONSET: ON-GOING

## 2021-08-12 ASSESSMENT — PAIN DESCRIPTION - PROGRESSION: CLINICAL_PROGRESSION: GRADUALLY IMPROVING

## 2021-08-12 ASSESSMENT — PAIN DESCRIPTION - LOCATION: LOCATION: HEAD;BACK;NECK;SHOULDER

## 2021-08-13 DIAGNOSIS — L29.9 ITCHING: ICD-10-CM

## 2021-08-13 RX ORDER — HYDROXYZINE HYDROCHLORIDE 25 MG/1
25 TABLET, FILM COATED ORAL EVERY 8 HOURS PRN
Qty: 30 TABLET | Refills: 1 | Status: SHIPPED | OUTPATIENT
Start: 2021-08-13 | End: 2021-11-22

## 2021-08-13 NOTE — ED PROVIDER NOTES
[address] ED  EMERGENCY DEPARTMENT ENCOUNTER      Pt Name: Jesus Patel  MRN: [id number]  [place] 7/30/1922  Date of evaluation: 8/12/2021  Provider: Mark Jacobs MD    [address] [place] [address]       Chief Complaint   Patient presents with    Fall     right and left shoulders bruised; patient on blood thinners; fall today     Head Injury     bruise on back of head from fall this morning; patient on blood thinners         HISTORY OF PRESENT ILLNESS   (Location/Symptom, Timing/Onset, Context/Setting, Quality, Duration, Modifying Factors, Severity)  Note limiting factors. 77-year-old female presenting after mechanical fall. Patient fell this morning after tripping over her feet. She did hit her head. No loss of consciousness reported. Caregiver, daughter, noted increased bruising throughout the day to both shoulders; specifically notes R shoulder pain. Pain is not controlled with home medications. No vomiting. Patient is a [facility]. On plavix. Nursing Notes were reviewed. REVIEW OF SYSTEMS    (2-9 systems for level 4, 10 or more for level 5)     Review of Systems   Musculoskeletal: Positive for arthralgias. All other systems reviewed and are negative. Except as noted above the remainder of the review of systems was reviewed and negative.        PAST MEDICAL HISTORY     Past Medical History:   Diagnosis Date    Anemia     Arthritis     Bleeding from breast 2003    R breast treated by dr Meño Francis CAD (coronary artery disease)     Cancer ([facility] [address]     melanoma and carcinoma    Carpal tunnel syndrome     Chronic back pain     Dupuytren's contracture of hand 2009    Left hand treated by dr Yancy Goldberg GERD (gastroesophageal reflux disease)     History of mammogram 7/2011    WNL    Hyperlipidemia     Hypertension     Obesity     Osteopenia     hips    Peripheral neuropathy     Presbyesophagus 2009    esophogram 3/19/2009    Shingles [id number]    Shingles     Type II or unspecified type diabetes mellitus without mention of complication, not stated as uncontrolled          SURGICAL HISTORY       Past Surgical History:   Procedure Laterality Date    APPENDECTOMY      CARDIAC CATHETERIZATION  04/06/2004    CARDIAC CATHETERIZATION  10/01/2002    CHOLECYSTECTOMY      COLONOSCOPY  2003    rectal hemangiomas, and colon polyps    COLONOSCOPY  04/29/2010    CYSTOSCOPY  2010    LARYNGOSCOPY  2008   Aye Her MALIGNANT SKIN LESION EXCISION  2001   Aye Her OTHER SURGICAL HISTORY  2012    right arm, several sutures applied from injury    PTCA  2002 no stents, 2005 two stents, 2004 two stents    UPPER GASTROINTESTINAL ENDOSCOPY  2003         CURRENT MEDICATIONS       Previous Medications    ACETAMINOPHEN (TYLENOL ARTHRITIS EXT RELIEF PO)    Take by mouth    AMLODIPINE (NORVASC) 5 MG TABLET    Take 1 tablet by mouth 2 times daily    ASPIRIN 81 MG TABLET    Take 81 mg by mouth daily.       CALCIUM CARBONATE (OSCAL) 500 MG TABS TABLET    Take 500 mg by mouth daily    CLOPIDOGREL (PLAVIX) 75 MG TABLET    TAKE 1 TABLET BY MOUTH DAILY    COMPRESSION STOCKINGS MISC    by Does not apply route B/l knee high compression stockings; 20-30mmhg    CONTINUOUS BLOOD GLUC  (FREESTYLE SIXTO 14 DAY READER) KATELYNN    As directed    CONTINUOUS BLOOD GLUC SENSOR (FREESTYLE SIXTO 14 DAY SENSOR) MISC    Every 2 weeks    DIPHENHYDRAMINE HCL (BENADRYL ALLERGY PO)    Take by mouth    DRUG MART UNIFINE PENTIPS 31G X 6 MM MISC    use 3 TO 4 times daily AS DIRECTED    FEXOFENADINE HCL (ALLEGRA ALLERGY PO)    Take by mouth     FREESTYLE LANCETS MISC    USE AS DIRECTED to test blood sugar THREE TIMES DAILY    FREESTYLE LITE STRIP    test 3 times daily    FUROSEMIDE (LASIX) 40 MG TABLET        HANDICAP PLACARD MISC    by Does not apply route Good through 9/30/2024    HANDICAP PLACARD MISC    by Does not apply route Good through 7/1/2026    HUMALOG KWIKPEN 100 UNIT/ML SOPN    inject 4 units in the am & lunch if glucose more than 160 HYDROXYZINE (ATARAX) 25 MG TABLET    Take 1 tablet by mouth every 8 hours as needed for Itching    INSULIN GLARGINE (LANTUS SOLOSTAR) 100 UNIT/ML INJECTION PEN    Inject 6 units into the skin nightly    ISOSORBIDE MONONITRATE (IMDUR) 30 MG EXTENDED RELEASE TABLET    TAKE 1 TABLET BY MOUTH DAILY    LEVOTHYROXINE (SYNTHROID) 100 MCG TABLET    TAKE 1 TABLET EVERY DAY    LISINOPRIL (PRINIVIL;ZESTRIL) 5 MG TABLET    bid    METOPROLOL TARTRATE (LOPRESSOR) 25 MG TABLET    Take 25 mg by mouth 2 times daily    MULTIPLE VITAMINS-MINERALS (MULTIVITAL PO)    Take 1 tablet by mouth daily. NITROGLYCERIN (NITROQUICK) 0.4 MG SL TABLET    Place 0.4 mg under the tongue every 5 minutes as needed. OMEPRAZOLE (PRILOSEC) 20 MG CAPSULE    Take 20 mg by mouth daily. POTASSIUM 99 MG TABS    Take 1 tablet by mouth 2 times daily     POTASSIUM GLUCONATE 550 MG TABLET    Take 1 tablet by mouth daily    RANOLAZINE (RANEXA) 500 MG EXTENDED RELEASE TABLET    Take 500 mg by mouth daily     SIMVASTATIN (ZOCOR) 40 MG TABLET    TAKE 1 TABLET BY MOUTH nightly    SODIUM CHLORIDE (LUIS FERNANDO 128) 5 % OPHTHALMIC SOLUTION    Use 1 Drop in both eyes four times daily. ALLERGIES     Cimetidine, Codeine, Colesevelam, Glucophage [metformin hydrochloride], Lisinopril, Metformin, Other, Oxycodone, Percocet [oxycodone-acetaminophen], Sulfa antibiotics, Welchol [colesevelam hcl], Nifedipine, Pregabalin, Ranitidine hcl, and Sulfamethopyrazine    FAMILY HISTORY       Family History   Problem Relation Age of Onset    Ovarian Cancer Sister     Heart Attack Sister           SOCIAL HISTORY       Social History     Socioeconomic History    Marital status:      Spouse name: None    Number of children: None    Years of education: None    Highest education level: None   Occupational History    None   Tobacco Use    Smoking status: Never Smoker    Smokeless tobacco: Never Used   Substance and Sexual Activity    Alcohol use: No    Drug use:  No  Sexual activity: Never   Other Topics Concern    None   Social History Narrative    None     Social Determinants of Health     Financial Resource Strain: Low Risk     Difficulty of Paying Living Expenses: Not hard at all   Food Insecurity: No Food Insecurity    Worried About Running Out of Food in the Last Year: Never true    Luisa of Food in the Last Year: Never true   Transportation Needs: No Transportation Needs    Lack of Transportation (Medical): No    Lack of Transportation (Non-Medical): No   Physical Activity:     Days of Exercise per Week:     Minutes of Exercise per Session:    Stress:     Feeling of Stress :    Social Connections:     Frequency of Communication with Friends and Family:     Frequency of Social Gatherings with Friends and Family:     Attends Zoroastrian Services:     Active Member of Clubs or Organizations:     Attends Club or Organization Meetings:     Marital Status:    Intimate Partner Violence:     Fear of Current or Ex-Partner:     Emotionally Abused:     Physically Abused:     Sexually Abused:        SCREENINGS               PHYSICAL EXAM    (up to 7 for level 4, 8 or more for level 5)     ED Triage Vitals [08/12/21 1932]   BP Temp Temp Source Pulse Resp SpO2 Height Weight   (!) 125/57 96.9 °F (36.1 °C) Temporal 72 18 95 % 4' 7\" (1.397 m) 106 lb (48.1 kg)       Physical Exam  Vitals and nursing note reviewed. Constitutional:       General: She is not in acute distress. Appearance: Normal appearance. She is well-developed. She is not ill-appearing. HENT:      Head: Normocephalic and atraumatic. Comments: No hematoma     Right Ear: Tympanic membrane normal.      Left Ear: Tympanic membrane normal.      Mouth/Throat:      Mouth: Mucous membranes are moist.      Pharynx: Oropharynx is clear. Eyes:      Extraocular Movements: Extraocular movements intact.       Conjunctiva/sclera: Conjunctivae normal.   Cardiovascular:      Rate and Rhythm: Normal rate and regular rhythm. Pulmonary:      Effort: Pulmonary effort is normal.      Breath sounds: Normal breath sounds. Abdominal:      General: Bowel sounds are normal.      Palpations: Abdomen is soft. Tenderness: There is no abdominal tenderness. Musculoskeletal:         General: Tenderness present. No deformity. Normal range of motion. Cervical back: Normal range of motion and neck supple. Comments: Tenderness and ecchymosis noted to both shoulders   Skin:     General: Skin is warm and dry. Capillary Refill: Capillary refill takes less than 2 seconds. Neurological:      General: No focal deficit present. Mental Status: She is alert and oriented to person, place, and time. Mental status is at baseline. Cranial Nerves: No cranial nerve deficit. Psychiatric:         Thought Content:  Thought content normal.         DIAGNOSTIC RESULTS     EKG: All EKG's are interpreted by the Emergency Department Physician who either signs or Co-signs this chart in the absence of a cardiologist.    RADIOLOGY:   Non-plain film images such as CT, Ultrasound and MRI are read by the radiologist. Plain radiographic images are visualized and preliminarily interpreted by the emergency physician with the below findings:    L shoulder- neg acute  R shoulder- neg acute    Interpretation per the Radiologist below, if available at the time of this note:    [address]    (Results Pending)   XR SHOULDER LEFT (MIN 2 VIEWS)    (Results Pending)   XR SHOULDER RIGHT (MIN 2 VIEWS)    (Results Pending)       LABS:  Labs Reviewed   COMPREHENSIVE METABOLIC PANEL - Abnormal; Notable for the following components:       Result Value    Glucose 314 (*)     BUN 26 (*)     CREATININE 1.12 (*)     GFR Non- 44.8 (*)     GFR  54.2 (*)     Globulin 3.9 (*)     All other components within normal limits   CBC WITH AUTO DIFFERENTIAL - Abnormal; Notable for the following components:    RBC 3.75 (*)     Hemoglobin 10.9 (*)     Hematocrit 35.0 (*)     MCHC 31.1 (*)     RDW 15.0 (*)     All other components within normal limits   COVID-19, RAPID       All other labs were within normal range or not returned as of this dictation. EMERGENCY DEPARTMENT COURSE and DIFFERENTIAL DIAGNOSIS/MDM:   Vitals:    Vitals:    08/12/21 1932 08/12/21 2030   BP: (!) 125/57    Pulse: 72 64   Resp: 18    Temp: 96.9 °F (36.1 °C)    TempSrc: Temporal    SpO2: 95%    Weight: 106 lb (48.1 kg)    Height: 4' 7\" (1.397 m)        MDM  Number of Diagnoses or Management Options  Closed head injury, initial encounter  Fall, initial encounter  Diagnosis management comments: 80-year-old female presenting after mechanical fall. Imaging is negative. Patient is a [facility]. Will give course of Percocet for symptom relief. Patient will be discharged home in good condition. Patient has been hemodynamically stable throughout ED course and is appropriate for outpatient follow up. Patient should follow up with PCP in 2-3 days or return to ED immediately for any new or worsening symptoms. Patient is well appearing on discharge and agreeable with plan of care. Procedures    CRITICAL CARE TIME   Total Critical Care time was 0 minutes, excluding separately reportable procedures. There was a high probability of clinically significant/life threatening deterioration in the patient's condition which required my urgent intervention. FINAL IMPRESSION      1. Fall, initial encounter    2.  Closed head injury, initial encounter          DISPOSITION/PLAN   DISPOSITION Decision To Discharge 08/12/2021 08:54:46 PM      (Please note that portions of this note were completed with a voice recognition program.  Efforts were made to edit the dictations but occasionally words are mis-transcribed.)    Ihsan Cordero MD (electronically signed)  Attending Emergency Physician        Ihsan Cordero MD  08/12/21 3071

## 2021-08-13 NOTE — TELEPHONE ENCOUNTER
Rx requested:  Requested Prescriptions     Pending Prescriptions Disp Refills    hydrOXYzine (ATARAX) 25 MG tablet [Pharmacy Med Name: hydroxyzine HCl 25 mg tablet] 30 tablet 1     Sig: Take 1 tablet by mouth every 8 hours as needed for Itching       Last Office Visit:   8/10/2021      Last filled:  N/A    Next Visit Date:  Future Appointments   Date Time Provider Ed Fried   1/27/2022 11:00 AM Jil Laguerre MD [address] [place] [address]

## 2021-09-29 DIAGNOSIS — E11.9 TYPE 2 DIABETES MELLITUS WITHOUT COMPLICATIONS (HCC): ICD-10-CM

## 2021-09-29 RX ORDER — FLASH GLUCOSE SENSOR
KIT MISCELLANEOUS
Qty: 2 EACH | Refills: 5 | Status: SHIPPED | OUTPATIENT
Start: 2021-09-29 | End: 2022-02-11

## 2021-10-01 ENCOUNTER — HOSPITAL ENCOUNTER (OUTPATIENT)
Dept: LAB | Age: 86
Discharge: HOME | End: 2021-10-01
Payer: MEDICARE

## 2021-10-01 DIAGNOSIS — E11.65 UNCONTROLLED TYPE 2 DIABETES MELLITUS WITH HYPERGLYCEMIA (HCC): ICD-10-CM

## 2021-10-01 DIAGNOSIS — E03.9 HYPOTHYROIDISM, UNSPECIFIED TYPE: ICD-10-CM

## 2021-10-01 LAB
ANION GAP SERPL CALCULATED.3IONS-SCNC: 13 MEQ/L (ref 9–15)
BUN BLDV-MCNC: 18 MG/DL (ref 8–23)
CALCIUM SERPL-MCNC: 8.8 MG/DL (ref 8.5–9.9)
CHLORIDE BLD-SCNC: 101 MEQ/L (ref 95–107)
CO2: 27 MEQ/L (ref 20–31)
CREAT SERPL-MCNC: 0.92 MG/DL (ref 0.5–0.9)
GFR AFRICAN AMERICAN: >60
GFR NON-AFRICAN AMERICAN: 56.2
GLUCOSE BLD-MCNC: 160 MG/DL (ref 70–99)
HBA1C MFR BLD: 8.6 % (ref 4.8–5.9)
POTASSIUM SERPL-SCNC: 4 MEQ/L (ref 3.4–4.9)
SODIUM BLD-SCNC: 141 MEQ/L (ref 135–144)
T4 FREE: 1.36 NG/DL (ref 0.84–1.68)
TSH SERPL DL<=0.05 MIU/L-ACNC: 2.04 UIU/ML (ref 0.44–3.86)

## 2021-10-01 PROCEDURE — 84443 ASSAY THYROID STIM HORMONE: CPT

## 2021-10-01 PROCEDURE — 83036 HEMOGLOBIN GLYCOSYLATED A1C: CPT

## 2021-10-01 PROCEDURE — 84439 ASSAY OF FREE THYROXINE: CPT

## 2021-10-01 PROCEDURE — 36415 COLL VENOUS BLD VENIPUNCTURE: CPT

## 2021-10-01 PROCEDURE — 80048 BASIC METABOLIC PNL TOTAL CA: CPT

## 2021-10-06 ENCOUNTER — VIRTUAL VISIT (OUTPATIENT)
Dept: ENDOCRINOLOGY | Age: 86
End: 2021-10-06
Payer: MEDICARE

## 2021-10-06 DIAGNOSIS — Z79.4 TYPE 2 DIABETES MELLITUS WITHOUT COMPLICATION, WITH LONG-TERM CURRENT USE OF INSULIN (HCC): Primary | ICD-10-CM

## 2021-10-06 DIAGNOSIS — E11.9 TYPE 2 DIABETES MELLITUS WITHOUT COMPLICATION, WITH LONG-TERM CURRENT USE OF INSULIN (HCC): Primary | ICD-10-CM

## 2021-10-06 DIAGNOSIS — E11.65 TYPE 2 DIABETES MELLITUS WITH HYPERGLYCEMIA, WITH LONG-TERM CURRENT USE OF INSULIN (HCC): ICD-10-CM

## 2021-10-06 DIAGNOSIS — E03.9 HYPOTHYROIDISM, UNSPECIFIED TYPE: ICD-10-CM

## 2021-10-06 DIAGNOSIS — E11.40 TYPE 2 DIABETES MELLITUS WITH DIABETIC NEUROPATHY, WITH LONG-TERM CURRENT USE OF INSULIN (HCC): ICD-10-CM

## 2021-10-06 DIAGNOSIS — Z79.4 TYPE 2 DIABETES MELLITUS WITH DIABETIC NEUROPATHY, WITH LONG-TERM CURRENT USE OF INSULIN (HCC): ICD-10-CM

## 2021-10-06 DIAGNOSIS — Z79.4 TYPE 2 DIABETES MELLITUS WITH HYPERGLYCEMIA, WITH LONG-TERM CURRENT USE OF INSULIN (HCC): ICD-10-CM

## 2021-10-06 PROCEDURE — 99442 PR PHYS/QHP TELEPHONE EVALUATION 11-20 MIN: CPT | Performed by: INTERNAL MEDICINE

## 2021-10-06 NOTE — PROGRESS NOTES
10/6/2021    TELEHEALTH EVALUATION -- Audio/Visual (During Z-84 public health emergency)    Due to COVID 19 outbreak, patient's office visit was converted to a virtual visit. Patient was contacted and agreed to proceed with a virtual visit via Telephone Visit  The risks and benefits of converting to a virtual visit were discussed in light of the current infectious disease epidemic. Patient also understood that insurance coverage and co-pays are up to their individual insurance plans. HPI: Telephone visit patient was at home I was at my office spoke to patient's family overall has been stable overall blood sugars are mostly below 200 denies any hypoglycemia  Patient on Lantus 6 units at bedtime plus Humalog twice a day for glucose more than 160    A1c was . 8.6  Using freestyle lori continuous glucose monitoring  Hypothyroidism on replacement with levothyroxine thyroid function tests were reviewed stable    Veronique Beltrán (:  1922) has requested an audio/video evaluation for the following concern(s):    Testing 3 times/day freestlye lori     Results for Jerod Burton (MRN [id number]) as of 10/6/2021 16:49   Ref. Range 10/1/2021 11:40   Sodium Latest Ref Range: 135 - 144 mEq/L 141   Potassium Latest Ref Range: 3.4 - 4.9 mEq/L 4.0   Chloride Latest Ref Range: 95 - 107 mEq/L 101   CO2 Latest Ref Range: 20 - 31 mEq/L 27   BUN Latest Ref Range: 8 - 23 mg/dL 18   Creatinine Latest Ref Range: 0.50 - 0.90 mg/dL 0.92 (H)   Anion Gap Latest Ref Range: 9 - 15 mEq/L 13   GFR Non- Latest Ref Range: >60  56.2 (L)   GFR  Latest Ref Range: >60  >60.0   Glucose Latest Ref Range: 70 - 99 mg/dL 160 (H)   Calcium Latest Ref Range: 8.5 - 9.9 mg/dL 8.8   Hemoglobin A1C Latest Ref Range: 4.8 - 5.9 % 8.6 (H)   TSH Latest Ref Range: 0.440 - 3.860 uIU/mL 2.040   T4 Free Latest Ref Range: 0.84 - 1.68 ng/dL 1.36       Review of Systems   Constitutional: Positive for fatigue.    Endocrine: Negative. All other systems reviewed and are negative. Prior to Visit Medications    Medication Sig Taking?  Authorizing Provider   Continuous Blood Gluc Sensor (FREESTYLE SIXTO 14 DAY SENSOR) MISC Every 2 weeks  Michelle Mcbride MD   isosorbide mononitrate (IMDUR) 30 MG extended release tablet TAKE 1 TABLET BY MOUTH DAILY  Martin Damon MD   insulin glargine (LANTUS SOLOSTAR) 100 UNIT/ML injection pen Inject 6 units into the skin nightly  Michelle Mcbride MD   Handicap Placard MISC by Does not apply route Good through 7/1/2026  Martin Damon MD   diphenhydrAMINE HCl (BENADRYL ALLERGY PO) Take by mouth  Historical Provider, MD   DRUG [facility]ART UNIFINE PENTIPS 31G X 6 MM MISC use 3 TO 4 times daily AS DIRECTED  Michelle Mcbride MD   levothyroxine (SYNTHROID) 100 MCG tablet TAKE 1 TABLET Ike Welch MD   simvastatin (ZOCOR) 40 MG tablet TAKE 1 TABLET BY MOUTH nightly  Martin Damon MD   potassium gluconate 550 mg tablet Take 1 tablet by mouth daily  Historical Provider, MD   furosemide (LASIX) 40 MG tablet   Historical Provider, MD   HUMALOG KWIKPEN 100 UNIT/ML SOPN inject 4 units in the am & lunch if glucose more than 160  Michelle Mcbride MD   Continuous Blood Gluc  (FREESTYLE SIXTO 14 DAY READER) KATELYNN As directed  MD RENETTA ElenaSTYLE LITE strip test 3 times daily  Michelle Mcbride MD   calcium carbonate (OSCAL) 500 MG TABS tablet Take 500 mg by mouth daily  Historical Provider, MD   metoprolol tartrate (LOPRESSOR) 25 MG tablet Take 25 mg by mouth 2 times daily  Historical Provider, MD   ranolazine (RANEXA) 500 MG extended release tablet Take 500 mg by mouth daily   Historical Provider, MD   Acetaminophen (TYLENOL ARTHRITIS EXT RELIEF PO) Take by mouth  Historical Provider, MD   clopidogrel (PLAVIX) 75 MG tablet TAKE 1 TABLET BY MOUTH DAILY  Historical Provider, MD Gelleryle Lancets MISC USE AS DIRECTED to test blood sugar THREE TIMES DAILY  Michelle Mcbride MD   Compression Stockings MISC by Does not apply route B/l knee high compression stockings; 20-30mmhg  Barney Ortiz MD   Handicap Placard MISC by Does not apply route Good through 9/30/2024  Barney Ortiz MD   sodium chloride (LUIS FERNANDO 128) 5 % ophthalmic solution Use 1 Drop in both eyes four times daily. Historical Provider, MD   lisinopril (PRINIVIL;ZESTRIL) 5 MG tablet bid  Historical Provider, MD   amLODIPine (NORVASC) 5 MG tablet Take 1 tablet by mouth 2 times daily  Barney Ortiz MD   Fexofenadine HCl (ALLEGRA ALLERGY PO) Take by mouth   Historical Provider, MD   Potassium 99 MG TABS Take 1 tablet by mouth 2 times daily   Historical Provider, MD   Multiple Vitamins-Minerals (MULTIVITAL PO) Take 1 tablet by mouth daily. Historical Provider, MD   omeprazole (PRILOSEC) 20 MG capsule Take 20 mg by mouth daily. Historical Provider, MD   nitroGLYCERIN (NITROQUICK) 0.4 MG SL tablet Place 0.4 mg under the tongue every 5 minutes as needed. Historical Provider, MD   aspirin 81 MG tablet Take 81 mg by mouth daily. Historical Provider, MD       Social History     Tobacco Use    Smoking status: Never Smoker    Smokeless tobacco: Never Used   Substance Use Topics    Alcohol use: No    Drug use:  No            PHYSICAL EXAMINATION:  [ INSTRUCTIONS:  \"[x]\" Indicates a positive item  \"[]\" Indicates a negative item  -- DELETE ALL ITEMS NOT EXAMINED]  [] Alert  [] Oriented to person/place/time    [] No apparent distress  [] Toxic appearing    [] Face flushed appearing [] Sclera clear  [] Lips are cyanotic      [] Breathing appears normal  [] Appears tachypneic      [] Rash on visible skin    [] Cranial Nerves II-XII grossly intact    [] Motor grossly intact in visible upper extremities    [] Motor grossly intact in visible lower extremities    [] Normal Mood  [] Anxious appearing    [] Depressed appearing  [] Confused appearing      [] Poor short term memory  [] Poor long term memory    [] OTHER:      Due to this being a TeleHealth encounter, evaluation of the following organ systems is limited: Vitals/Constitutional/EENT/Resp/CV/GI//MS/Neuro/Skin/Heme-Lymph-Imm. ASSESSMENT/PLAN:     Diagnosis Orders   1. Type 2 diabetes mellitus without complication, with long-term current use of insulin ([facility])  Basic Metabolic Panel    Hemoglobin A1C   2. Hypothyroidism, unspecified type  T4, Free    TSH without Reflex   3. Type 2 diabetes mellitus with hyperglycemia, with long-term current use of insulin ([facility] [address]     4. Type 2 diabetes mellitus with diabetic neuropathy, with long-term current use of insulin ([facility] [address]       Orders Placed This Encounter   Procedures    Basic Metabolic Panel     Standing Status:   Future     Standing Expiration Date:   10/6/2022    Hemoglobin A1C     Standing Status:   Future     Standing Expiration Date:   10/6/2022    T4, Free     Standing Status:   Future     Standing Expiration Date:   10/6/2022    TSH without Reflex     Standing Status:   Future     Standing Expiration Date:   10/6/2022     Continue current dose of Lantus and Humalog continue Synthroid follow-up in 6 to 12 months time  Total time spent was 15 minutes    An  electronic signature was used to authenticate this note. --Jg Vo MD on 10/6/2021 at 4:48 PM        Pursuant to the emergency declaration under the [facility] waiver authority and the [employer] Act, this Virtual  Visit was conducted, with patient's consent, to reduce the patient's risk of exposure to COVID-19 and provide continuity of care for an established patient. Services were provided through a video synchronous discussion virtually to substitute for in-person clinic visit.

## 2021-11-22 DIAGNOSIS — L29.9 ITCHING: ICD-10-CM

## 2021-11-22 RX ORDER — HYDROXYZINE HYDROCHLORIDE 25 MG/1
25 TABLET, FILM COATED ORAL EVERY 8 HOURS PRN
Qty: 30 TABLET | Refills: 1 | Status: SHIPPED | OUTPATIENT
Start: 2021-11-22 | End: 2021-12-22

## 2021-11-22 NOTE — TELEPHONE ENCOUNTER
Comments:     Last Office Visit (last PCP visit):   8/10/2021    Next Visit Date:  Future Appointments   Date Time Provider Ed Fried   1/27/2022 11:00 AM Jen Carpenter MD [facility]       **If hasn't been seen in over a year OR hasn't followed up according to last diabetes/ADHD visit, make appointment for patient before sending refill to provider.     Rx requested:  Requested Prescriptions     Pending Prescriptions Disp Refills    hydrOXYzine (ATARAX) 25 MG tablet [Pharmacy Med Name: hydroxyzine HCl 25 mg tablet] 30 tablet 1     Sig: Take 1 tablet by mouth every 8 hours as needed for Itching

## 2021-12-21 RX ORDER — PEN NEEDLE, DIABETIC 31 G X1/4"
NEEDLE, DISPOSABLE MISCELLANEOUS
Qty: 100 EACH | Refills: 3 | Status: SHIPPED | OUTPATIENT
Start: 2021-12-21 | End: 2022-05-23

## 2021-12-30 RX ORDER — INSULIN GLARGINE 100 [IU]/ML
INJECTION, SOLUTION SUBCUTANEOUS
Qty: 15 ML | Refills: 3 | Status: SHIPPED | OUTPATIENT
Start: 2021-12-30

## 2021-12-30 RX ORDER — INSULIN LISPRO 100 [IU]/ML
INJECTION, SOLUTION INTRAVENOUS; SUBCUTANEOUS
Qty: 15 ML | Refills: 3 | Status: SHIPPED | OUTPATIENT
Start: 2021-12-30

## 2022-01-01 DIAGNOSIS — E11.9 TYPE 2 DIABETES MELLITUS WITHOUT COMPLICATIONS (HCC): ICD-10-CM

## 2022-01-01 RX ORDER — FLASH GLUCOSE SENSOR
KIT MISCELLANEOUS
Refills: 0 | OUTPATIENT
Start: 2022-01-01

## 2022-02-01 ENCOUNTER — HOSPITAL ENCOUNTER (OUTPATIENT)
Age: 87
Setting detail: SPECIMEN
Discharge: HOME | End: 2022-02-01
Payer: MEDICARE

## 2022-02-01 ENCOUNTER — OFFICE VISIT (OUTPATIENT)
Dept: FAMILY MEDICINE CLINIC | Age: 87
End: 2022-02-01
Payer: MEDICARE

## 2022-02-01 VITALS
TEMPERATURE: 97.1 F | WEIGHT: 106 LBS | DIASTOLIC BLOOD PRESSURE: 72 MMHG | SYSTOLIC BLOOD PRESSURE: 116 MMHG | HEART RATE: 66 BPM | OXYGEN SATURATION: 98 % | BODY MASS INDEX: 24.64 KG/M2

## 2022-02-01 DIAGNOSIS — Z00.00 ENCOUNTER FOR ANNUAL WELLNESS EXAM IN MEDICARE PATIENT: Primary | ICD-10-CM

## 2022-02-01 DIAGNOSIS — Z00.00 ROUTINE GENERAL MEDICAL EXAMINATION AT A HEALTH CARE FACILITY: ICD-10-CM

## 2022-02-01 DIAGNOSIS — Z79.4 TYPE 2 DIABETES MELLITUS WITHOUT COMPLICATION, WITH LONG-TERM CURRENT USE OF INSULIN (HCC): ICD-10-CM

## 2022-02-01 DIAGNOSIS — E11.9 TYPE 2 DIABETES MELLITUS WITHOUT COMPLICATION, WITH LONG-TERM CURRENT USE OF INSULIN (HCC): ICD-10-CM

## 2022-02-01 DIAGNOSIS — E11.65 TYPE 2 DIABETES MELLITUS WITH HYPERGLYCEMIA, WITH LONG-TERM CURRENT USE OF INSULIN (HCC): ICD-10-CM

## 2022-02-01 DIAGNOSIS — I50.9 ACUTE CONGESTIVE HEART FAILURE, UNSPECIFIED HEART FAILURE TYPE (HCC): ICD-10-CM

## 2022-02-01 DIAGNOSIS — Z79.4 TYPE 2 DIABETES MELLITUS WITH HYPERGLYCEMIA, WITH LONG-TERM CURRENT USE OF INSULIN (HCC): ICD-10-CM

## 2022-02-01 DIAGNOSIS — E03.9 HYPOTHYROIDISM, UNSPECIFIED TYPE: ICD-10-CM

## 2022-02-01 LAB
ANION GAP SERPL CALCULATED.3IONS-SCNC: 11 MEQ/L (ref 9–15)
BUN BLDV-MCNC: 32 MG/DL (ref 8–23)
CALCIUM SERPL-MCNC: 8.7 MG/DL (ref 8.5–9.9)
CHLORIDE BLD-SCNC: 104 MEQ/L (ref 95–107)
CO2: 29 MEQ/L (ref 20–31)
CREAT SERPL-MCNC: 1.21 MG/DL (ref 0.5–0.9)
GFR AFRICAN AMERICAN: 49.6
GFR NON-AFRICAN AMERICAN: 41
GLUCOSE BLD-MCNC: 218 MG/DL (ref 70–99)
HBA1C MFR BLD: 8.3 % (ref 4.8–5.9)
POTASSIUM SERPL-SCNC: 4.4 MEQ/L (ref 3.4–4.9)
SODIUM BLD-SCNC: 144 MEQ/L (ref 135–144)
T4 FREE: 1.83 NG/DL (ref 0.84–1.68)
TSH SERPL DL<=0.05 MIU/L-ACNC: 0.83 UIU/ML (ref 0.44–3.86)

## 2022-02-01 PROCEDURE — 83036 HEMOGLOBIN GLYCOSYLATED A1C: CPT

## 2022-02-01 PROCEDURE — 36415 COLL VENOUS BLD VENIPUNCTURE: CPT | Performed by: FAMILY MEDICINE

## 2022-02-01 PROCEDURE — 1123F ACP DISCUSS/DSCN MKR DOCD: CPT | Performed by: FAMILY MEDICINE

## 2022-02-01 PROCEDURE — 84443 ASSAY THYROID STIM HORMONE: CPT

## 2022-02-01 PROCEDURE — G8484 FLU IMMUNIZE NO ADMIN: HCPCS | Performed by: FAMILY MEDICINE

## 2022-02-01 PROCEDURE — 84439 ASSAY OF FREE THYROXINE: CPT

## 2022-02-01 PROCEDURE — G0439 PPPS, SUBSEQ VISIT: HCPCS | Performed by: FAMILY MEDICINE

## 2022-02-01 PROCEDURE — 4040F PNEUMOC VAC/ADMIN/RCVD: CPT | Performed by: FAMILY MEDICINE

## 2022-02-01 PROCEDURE — 80048 BASIC METABOLIC PNL TOTAL CA: CPT

## 2022-02-01 RX ORDER — TRIAMCINOLONE ACETONIDE 1 MG/G
CREAM TOPICAL
COMMUNITY
Start: 2021-11-01

## 2022-02-01 RX ORDER — ERYTHROMYCIN 5 MG/G
OINTMENT OPHTHALMIC 4 TIMES DAILY
COMMUNITY
Start: 2021-08-16

## 2022-02-01 SDOH — ECONOMIC STABILITY: FOOD INSECURITY: WITHIN THE PAST 12 MONTHS, THE FOOD YOU BOUGHT JUST DIDN'T LAST AND YOU DIDN'T HAVE MONEY TO GET MORE.: NEVER TRUE

## 2022-02-01 SDOH — ECONOMIC STABILITY: FOOD INSECURITY: WITHIN THE PAST 12 MONTHS, YOU WORRIED THAT YOUR FOOD WOULD RUN OUT BEFORE YOU GOT MONEY TO BUY MORE.: NEVER TRUE

## 2022-02-01 ASSESSMENT — PATIENT HEALTH QUESTIONNAIRE - PHQ9
SUM OF ALL RESPONSES TO PHQ9 QUESTIONS 1 & 2: 2
SUM OF ALL RESPONSES TO PHQ QUESTIONS 1-9: 2
1. LITTLE INTEREST OR PLEASURE IN DOING THINGS: 1
SUM OF ALL RESPONSES TO PHQ QUESTIONS 1-9: 2
2. FEELING DOWN, DEPRESSED OR HOPELESS: 1
SUM OF ALL RESPONSES TO PHQ QUESTIONS 1-9: 2
SUM OF ALL RESPONSES TO PHQ QUESTIONS 1-9: 2

## 2022-02-01 ASSESSMENT — LIFESTYLE VARIABLES
AUDIT-C TOTAL SCORE: INCOMPLETE
HOW OFTEN DO YOU HAVE A DRINK CONTAINING ALCOHOL: NEVER
HOW OFTEN DO YOU HAVE A DRINK CONTAINING ALCOHOL: 0
AUDIT TOTAL SCORE: INCOMPLETE

## 2022-02-01 ASSESSMENT — SOCIAL DETERMINANTS OF HEALTH (SDOH): HOW HARD IS IT FOR YOU TO PAY FOR THE VERY BASICS LIKE FOOD, HOUSING, MEDICAL CARE, AND HEATING?: NOT HARD AT ALL

## 2022-02-01 NOTE — PATIENT INSTRUCTIONS
Personalized Preventive Plan for Seda Zamora - 2/1/2022  Medicare offers a range of preventive health benefits. Some of the tests and screenings are paid in full while other may be subject to a deductible, co-insurance, and/or copay. Some of these benefits include a comprehensive review of your medical history including lifestyle, illnesses that may run in your family, and various assessments and screenings as appropriate. After reviewing your medical record and screening and assessments performed today your provider may have ordered immunizations, labs, imaging, and/or referrals for you. A list of these orders (if applicable) as well as your Preventive Care list are included within your After Visit Summary for your review. Other Preventive Recommendations:    · A preventive eye exam performed by an eye specialist is recommended every 1-2 years to screen for glaucoma; cataracts, macular degeneration, and other eye disorders. · A preventive dental visit is recommended every 6 months. · Try to get at least 150 minutes of exercise per week or 10,000 steps per day on a pedometer . · Order or download the FREE \"Exercise & Physical Activity: Your Everyday Guide\" from [employer]. Call 5-760.167.7187 or search [employer] online. · You need 1238-6641 mg of calcium and 6616-6170 IU of vitamin D per day. It is possible to meet your calcium requirement with diet alone, but a vitamin D supplement is usually necessary to meet this goal.  · When exposed to the sun, use a sunscreen that protects against both UVA and UVB radiation with an SPF of 30 or greater. Reapply every 2 to 3 hours or after sweating, drying off with a towel, or swimming. · Always wear a seat belt when traveling in a car. Always wear a helmet when riding a bicycle or motorcycle.

## 2022-02-01 NOTE — PROGRESS NOTES
Medicare Annual Wellness Visit  Name: Harshad Ramírez Date: 2022   MRN: [id number] Sex: Female   Age: 80 y.o. Ethnicity: Non- / Non    : 1922 Race: White (non-)      Darrian Beltrán is here for Medicare AWV    Screenings for behavioral, psychosocial and functional/safety risks, and cognitive dysfunction are all negative except as indicated below. These results, as well as other patient data from the [address] form, are documented in Flowsheets linked to this Encounter. Allergies   Allergen Reactions    Cimetidine Diarrhea    Codeine Hives     Listed as getting hives from codeine but has repeatedly tolerated Vicodan and percocet without rash or allergy 12 HES    Colesevelam Diarrhea    Glucophage [Metformin Hydrochloride] Diarrhea    Lisinopril Other (See Comments)     Makes pt cough    Metformin Diarrhea    Other      Other reaction(s): Intolerance  Naprolen    Oxycodone Swelling    Percocet [Oxycodone-Acetaminophen] Swelling     ankles    Sulfa Antibiotics     Welchol [Colesevelam Hcl] Diarrhea    Nifedipine Nausea And Vomiting    Pregabalin Other (See Comments) and Rash     Sleep deprivation   Sleep deprivation     Ranitidine Hcl Nausea And Vomiting    Sulfamethopyrazine Nausea And Vomiting         Prior to Visit Medications    Medication Sig Taking?  Authorizing Provider   erythromycin (ROMYCIN) 5 MG/GM ophthalmic ointment 4 times daily Yes Historical Provider, MD   triamcinolone (KENALOG) 0.1 % cream apply TWICE DAILY to the affected areas until clear, may repeat AS NEEDED for flare, avoid face/ groin Yes Historical Provider, MD   HUMALOG KWIKPEN 100 UNIT/ML SOPN inject 4 units in the am & lunch if glucose more than 160 Yes Gorge Naik MD   LANTUS SOLOSTAR 100 UNIT/ML injection pen Inject 6 units into the skin nightly Yes Gorge Naik MD   DRUG MART UNIFINE PENTIPS 31G X 6 MM MISC use 3 TO 4 times daily AS DIRECTED Yes Alisa Cadena MD Continuous Blood Gluc Sensor (FREESTYLE SIXTO 14 DAY SENSOR) MISC Every 2 weeks Yes Shira Ballard MD   isosorbide mononitrate (IMDUR) 30 MG extended release tablet TAKE 1 TABLET BY MOUTH DAILY Yes MD Samuel Argueta MISC by Does not apply route Good through 7/1/2026 Yes Peng Carter MD   diphenhydrAMINE HCl (BENADRYL ALLERGY PO) Take by mouth Yes Historical Provider, MD   levothyroxine (SYNTHROID) 100 MCG tablet TAKE 1 TABLET EVERY DAY Yes Shira Ballard MD   simvastatin (ZOCOR) 40 MG tablet TAKE 1 TABLET BY MOUTH nightly Yes Peng Carter MD   potassium gluconate 550 mg tablet Take 1 tablet by mouth daily Yes Historical Provider, MD   furosemide (LASIX) 40 MG tablet  Yes Historical Provider, MD   Continuous Blood Gluc  (FREESTYLE SIXTO 14 DAY READER) KATELYNN As directed Yes Shira Ballard MD   FREESTYLE LITE strip test 3 times daily Yes Shira Ballard MD   calcium carbonate (OSCAL) 500 MG TABS tablet Take 500 mg by mouth daily Yes Historical Provider, MD   metoprolol tartrate (LOPRESSOR) 25 MG tablet Take 25 mg by mouth 2 times daily Yes Historical Provider, MD   ranolazine (RANEXA) 500 MG extended release tablet Take 500 mg by mouth daily  Yes Historical Provider, MD   Acetaminophen (TYLENOL ARTHRITIS EXT RELIEF PO) Take by mouth Yes Historical Provider, MD   clopidogrel (PLAVIX) 75 MG tablet TAKE 1 TABLET BY MOUTH DAILY Yes Historical Provider, MD   FreeStyle Lancets MISC USE AS DIRECTED to test blood sugar THREE TIMES DAILY Yes Shira Ballard MD   Compression Stockings MISC by Does not apply route B/l knee high compression stockings; 20-30mmhg Yes MD Samuel Argueta MISC by Does not apply route Good through 9/30/2024 Yes Peng Carter MD   sodium chloride (LUIS FERNANDO 128) 5 % ophthalmic solution Use 1 Drop in both eyes four times daily.  Yes Historical Provider, MD   lisinopril (PRINIVIL;ZESTRIL) 5 MG tablet bid Yes Historical Provider, MD   amLODIPine (NORVASC) 5 MG tablet Take 1 tablet by mouth 2 times daily Yes Deloris Molina MD   Fexofenadine HCl (ALLEGRA ALLERGY PO) Take by mouth  Yes Historical Provider, MD   Potassium 99 MG TABS Take 1 tablet by mouth 2 times daily  Yes Historical Provider, MD   Multiple Vitamins-Minerals (MULTIVITAL PO) Take 1 tablet by mouth daily. Yes Historical Provider, MD   omeprazole (PRILOSEC) 20 MG capsule Take 20 mg by mouth daily. Yes Historical Provider, MD   nitroGLYCERIN (NITROQUICK) 0.4 MG SL tablet Place 0.4 mg under the tongue every 5 minutes as needed. Yes Historical Provider, MD   aspirin 81 MG tablet Take 81 mg by mouth daily.    Yes Historical Provider, MD         Past Medical History:   Diagnosis Date    Anemia     Arthritis     Bleeding from breast 2003    R breast treated by dr Elle Gonzalez CAD (coronary artery disease)     Cancer ([facility] [address]     melanoma and carcinoma    Carpal tunnel syndrome     Chronic back pain     Dupuytren's contracture of hand 2009    Left hand treated by dr Damion Guidry GERD (gastroesophageal reflux disease)     History of mammogram 7/2011    WNL    Hyperlipidemia     Hypertension     Obesity     Osteopenia     hips    Peripheral neuropathy     Presbyesophagus 2009    esophogram 3/19/2009    Shingles [id number]    Shingles     Type II or unspecified type diabetes mellitus without mention of complication, not stated as uncontrolled        Past Surgical History:   Procedure Laterality Date    APPENDECTOMY      CARDIAC CATHETERIZATION  04/06/2004    CARDIAC CATHETERIZATION  10/01/2002    CHOLECYSTECTOMY      COLONOSCOPY  2003    rectal hemangiomas, and colon polyps    COLONOSCOPY  04/29/2010    CYSTOSCOPY  2010    LARYNGOSCOPY  2008    MALIGNANT SKIN LESION EXCISION  2001   [facility] OTHER SURGICAL HISTORY  2012    right arm, several sutures applied from injury    PTCA  2002 no stents, 2005 two stents, 2004 two stents    UPPER GASTROINTESTINAL ENDOSCOPY  2003         Family History   Problem Relation Age of Onset    Ovarian Cancer Sister     Heart Attack Sister        Agnes (Including outside providers/suppliers regularly involved in providing care):   Patient Care Team:  Abran Marx MD as PCP - General (Family Medicine)  Abran Marx MD as PCP - REHABILITATION [facility] Empaneled Provider  Micky Burroughs MD (Endocrinology)    Wt Readings from Last 3 Encounters:   02/01/22 106 lb (48.1 kg)   08/12/21 106 lb (48.1 kg)   06/23/21 106 lb (48.1 kg)     Vitals:    02/01/22 1519   BP: 116/72   Pulse: 66   Temp: 97.1 °F (36.2 °C)   TempSrc: Infrared   SpO2: 98%   Weight: 106 lb (48.1 kg)     Body mass index is 24.64 kg/m². Based upon direct observation of the patient, evaluation of cognition reveals recent and remote memory intact. Patient's complete Health Risk Assessment and screening values have been reviewed and are found in Flowsheets. The following problems were reviewed today and where indicated follow up appointments were made and/or referrals ordered. Positive Risk Factor Screenings with Interventions:     Fall Risk:  2 or more falls in past year?: (!) yes  Fall with injury in past year?: (!) yes  Fall Risk Interventions:    · Home safety tips provided    Cognitive: Words recalled: 1 Word Recalled  Clock Drawing Test (CDT) Score: (!) Abnormal  Total Score Interpretation: Positive Mini-Cog  Cognitive Impairment Interventions:  · Patient declines any further evaluation/treatment for cognitive impairment           General Health and ACP:  General  In general, how would you say your health is?: Good  In the past 7 days, have you experienced any of the following?  New or Increased Pain, New or Increased Fatigue, Loneliness, Social Isolation, Stress or Anger?: (!) Loneliness  Do you get the social and emotional support that you need?: Yes  Do you have a Living Will?: Yes  Advance Directives     Power of  Living Will ACP-Advance Directive ACP-Power of     Not on File Not on File Not on File Not on File General Health Risk Interventions:  · has family support    Health Habits/Nutrition:  Health Habits/Nutrition  Do you exercise for at least 20 minutes 2-3 times per week?: (!) No  Have you lost any weight without trying in the past 3 months?: No  Do you eat only one meal per day?: No  Have you seen the dentist within the past year?: (!) No     Health Habits/Nutrition Interventions:  · Inadequate physical activity:  patient is not ready to increase his/her physical activity level at this time  · Dental exam overdue:  patient declines dental evaluation      ADL:  ADLs  In the past 7 days, did you need help from others to perform any of the following everyday activities? Eating, dressing, grooming, bathing, toileting, or walking/balance?: (!) Dressing,Bathing,Toileting,Walking/Balance  In the past 7 days, did you need help from others to take care of any of the following?  Laundry, housekeeping, banking/finances, shopping, telephone use, food preparation, transportation, or taking medications?: (!) Laundry,Housekeeping,Banking/Finances,Shopping,Food Preparation,Transportation  ADL Interventions:  · Patient declines any further evaluation/treatment for this issue      Personalized Preventive Plan   Current Health Maintenance Status  Immunization History   Administered Date(s) Administered    Influenza Vaccine, unspecified formulation 09/15/2016, 11/01/2019    Influenza Virus Vaccine 11/03/2014, 09/15/2016    Influenza Whole 11/03/2014    Influenza, High Dose (Fluzone 65 yrs and older) 09/20/2017, 10/08/2018, 11/03/2020    Influenza, Quadv, adjuvanted, 65 yrs +, IM, PF (Fluad) 10/12/2020    Influenza, Triv, inactivated, subunit, adjuvanted, IM (Fluad 65 yrs and older) 10/18/2019    Pneumococcal Conjugate 13-valent ([id number]) 03/27/2018    Pneumococcal Polysaccharide ([id number]) 11/16/2015        Health Maintenance   Topic Date Due    COVID-19 Vaccine (1) Never done    Depression Screen  Never done   Yocasta DTaP/Tdap/Td vaccine (1 - Tdap) Never done    Shingles Vaccine (1 of 2) Never done    Lipid screen  05/24/2021    Flu vaccine (1) 09/01/2021    Annual Wellness Visit (AWV)  01/27/2022    TSH testing  10/01/2022    Potassium monitoring  10/01/2022    Creatinine monitoring  10/01/2022    Pneumococcal 65+ years Vaccine  Completed    Hepatitis A vaccine  Aged Out    Hib vaccine  Aged Out    Meningococcal (ACWY) vaccine  Aged Out     Recommendations for MtoV Due: see orders and patient instructions/AVS.  . Recommended screening schedule for the next 5-10 years is provided to the patient in written form: see Patient Instructions/AVS.    Lawanda Christopher was seen today for medicare awv.     Diagnoses and all orders for this visit:    Encounter for annual wellness exam in Medicare patient    Acute congestive heart failure, unspecified heart failure type [facility])    Routine general medical examination at a health care facility

## 2022-02-10 DIAGNOSIS — E11.9 TYPE 2 DIABETES MELLITUS WITHOUT COMPLICATIONS (HCC): ICD-10-CM

## 2022-02-11 RX ORDER — FLASH GLUCOSE SENSOR
KIT MISCELLANEOUS
Qty: 2 EACH | Refills: 5 | Status: SHIPPED | OUTPATIENT
Start: 2022-02-11 | End: 2022-10-17

## 2022-04-20 RX ORDER — SIMVASTATIN 40 MG
TABLET ORAL
Qty: 30 TABLET | Refills: 11 | Status: SHIPPED | OUTPATIENT
Start: 2022-04-20

## 2022-04-20 NOTE — TELEPHONE ENCOUNTER
Comments:     Last Office Visit (last PCP visit):   2/1/2022    Next Visit Date:  No future appointments. **If hasn't been seen in over a year OR hasn't followed up according to last diabetes/ADHD visit, make appointment for patient before sending refill to provider.     Rx requested:  Requested Prescriptions     Pending Prescriptions Disp Refills    simvastatin (ZOCOR) 40 MG tablet [Pharmacy Med Name: simvastatin 40 mg tablet] 30 tablet 11     Sig: TAKE 1 TABLET BY MOUTH nightly

## 2022-04-24 RX ORDER — LEVOTHYROXINE SODIUM 0.1 MG/1
TABLET ORAL
Qty: 30 TABLET | Refills: 11 | Status: SHIPPED | OUTPATIENT
Start: 2022-04-24

## 2022-05-11 ENCOUNTER — TELEPHONE (OUTPATIENT)
Dept: FAMILY MEDICINE CLINIC | Age: 87
End: 2022-05-11

## 2022-05-11 NOTE — TELEPHONE ENCOUNTER
----- Message from Deshawn Irizarry sent at 5/11/2022 11:53 AM EDT -----  Subject: Referral Request    QUESTIONS   Reason for referral request? Home Health - Pt's daughter Pillo Bone and [place]   called in to see if she can get help with her mother. Pillo Bone requesting Home   Health. Pt has bedsores   Has the physician seen you for this condition before? No   Preferred Specialist (if applicable)? [facility]  Do you already have an appointment scheduled? No  Additional Information for Provider? Home Health request  ---------------------------------------------------------------------------  --------------  CALL BACK INFO  What is the best way for the office to contact you? OK to leave message on   voicemail  Preferred Call Back Phone Number? [phone]  ---------------------------------------------------------------------------  --------------  SCRIPT ANSWERS  Relationship to Patient? Other  Representative Name? Pillo Bone - daughter  Is the Representative on the appropriate HIPAA document in Epic?  Yes

## 2022-05-11 NOTE — TELEPHONE ENCOUNTER
Pt's daughter Gera Abdi and [place]   called in to see if she can get help with her mother. Gera Abdi requesting Home   Health.  Pt has bedsores

## 2022-05-19 DIAGNOSIS — E11.9 TYPE 2 DIABETES MELLITUS WITHOUT COMPLICATION, WITH LONG-TERM CURRENT USE OF INSULIN (HCC): Primary | ICD-10-CM

## 2022-05-19 DIAGNOSIS — Z79.4 TYPE 2 DIABETES MELLITUS WITHOUT COMPLICATION, WITH LONG-TERM CURRENT USE OF INSULIN (HCC): Primary | ICD-10-CM

## 2022-05-23 RX ORDER — PEN NEEDLE, DIABETIC 31 G X1/4"
NEEDLE, DISPOSABLE MISCELLANEOUS
Qty: 100 EACH | Refills: 3 | Status: SHIPPED | OUTPATIENT
Start: 2022-05-23

## 2022-05-23 NOTE — TELEPHONE ENCOUNTER
Pharmacy requesting medication refill. Please approve or deny this request.    Rx requested:  Requested Prescriptions     Pending Prescriptions Disp Refills    [facility] UNIFINE PENTIPS 31G X 6 MM [address] [facility] [Pharmacy Med Name: Sage Silva 31 gauge x 1/4\" needle] 100 each 3     Sig: use 3 TO 4 times daily AS DIRECTED         Last Office Visit:   10/6/2021      Next Visit Date:  No future appointments.

## 2022-06-09 ENCOUNTER — OFFICE VISIT (OUTPATIENT)
Dept: FAMILY MEDICINE CLINIC | Age: 87
End: 2022-06-09
Payer: MEDICARE

## 2022-06-09 VITALS
OXYGEN SATURATION: 93 % | TEMPERATURE: 97.8 F | DIASTOLIC BLOOD PRESSURE: 62 MMHG | HEART RATE: 83 BPM | SYSTOLIC BLOOD PRESSURE: 108 MMHG

## 2022-06-09 DIAGNOSIS — M54.50 CHRONIC BILATERAL LOW BACK PAIN, UNSPECIFIED WHETHER SCIATICA PRESENT: ICD-10-CM

## 2022-06-09 DIAGNOSIS — L98.422 SKIN ULCER OF SACRUM WITH FAT LAYER EXPOSED (HCC): Primary | ICD-10-CM

## 2022-06-09 DIAGNOSIS — Z91.81 AT HIGH RISK FOR FALLS: ICD-10-CM

## 2022-06-09 DIAGNOSIS — Z78.9 IMPAIRED MOBILITY AND ADLS: ICD-10-CM

## 2022-06-09 DIAGNOSIS — G89.29 CHRONIC BILATERAL LOW BACK PAIN, UNSPECIFIED WHETHER SCIATICA PRESENT: ICD-10-CM

## 2022-06-09 DIAGNOSIS — Z74.09 IMPAIRED MOBILITY AND ADLS: ICD-10-CM

## 2022-06-09 PROCEDURE — G8420 CALC BMI NORM PARAMETERS: HCPCS | Performed by: FAMILY MEDICINE

## 2022-06-09 PROCEDURE — 1123F ACP DISCUSS/DSCN MKR DOCD: CPT | Performed by: FAMILY MEDICINE

## 2022-06-09 PROCEDURE — 1090F PRES/ABSN URINE INCON ASSESS: CPT | Performed by: FAMILY MEDICINE

## 2022-06-09 PROCEDURE — 1036F TOBACCO NON-USER: CPT | Performed by: FAMILY MEDICINE

## 2022-06-09 PROCEDURE — 99214 OFFICE O/P EST MOD 30 MIN: CPT | Performed by: FAMILY MEDICINE

## 2022-06-09 PROCEDURE — G8427 DOCREV CUR MEDS BY ELIG CLIN: HCPCS | Performed by: FAMILY MEDICINE

## 2022-06-09 RX ORDER — LIDOCAINE 50 MG/G
OINTMENT TOPICAL
Qty: 50 G | Refills: 1 | Status: SHIPPED | OUTPATIENT
Start: 2022-06-09

## 2022-06-09 RX ORDER — ZINC OXIDE 25 %
OINTMENT (GRAM) TOPICAL
Qty: 28.4 G | Refills: 0 | Status: SHIPPED | OUTPATIENT
Start: 2022-06-09

## 2022-06-09 ASSESSMENT — ENCOUNTER SYMPTOMS
SHORTNESS OF BREATH: 0
RHINORRHEA: 0
ABDOMINAL PAIN: 0
WHEEZING: 0
CONSTIPATION: 0
COUGH: 0
DIARRHEA: 0
SORE THROAT: 0

## 2022-06-09 NOTE — PROGRESS NOTES
[address] [facility] [address] [facility]  [address] [address]  Dept: [phone]  Dept Fax: [phone]  Loc: [phone]     Chief Complaint  Chief Complaint   Patient presents with    Orders     home health care, DNR    Lesion(s)     sore on back of right thigh towards buttocks, sore on tailbone, purulent, x1 week    Pain     right side of torso, x2-3 weeks       HPI:  80 y. o.female who presents for the following:      Buttock lesion: present for weeks at least and not improving; unsure if infected; causes her pain; spends most of her time laying in bed or in chair leaning to the R side    Would like paperwork for DNR        Review of Systems   Constitutional: Negative for chills and fever. HENT: Negative for congestion, rhinorrhea and sore throat. Respiratory: Negative for cough, shortness of breath and wheezing. Gastrointestinal: Negative for abdominal pain, constipation and diarrhea. Endocrine: Negative for polydipsia and polyuria. Genitourinary: Negative for dysuria, frequency and urgency. Skin: Positive for rash and wound. Neurological: Negative for syncope, light-headedness, numbness and headaches. Psychiatric/Behavioral: Negative for sleep disturbance. The patient is not nervous/anxious.         Past Medical History:   Diagnosis Date    Anemia     Arthritis     Bleeding from breast 2003    R breast treated by dr Thomson Miss CAD (coronary artery disease)     Cancer ([facility] [address]     melanoma and carcinoma    Carpal tunnel syndrome     Chronic back pain     Dupuytren's contracture of hand 2009    Left hand treated by dr Kris Jensen GERD (gastroesophageal reflux disease)     History of mammogram 7/2011    WNL    Hyperlipidemia     Hypertension     Obesity     Osteopenia     hips    Peripheral neuropathy     Presbyesophagus 2009    esophogram 3/19/2009    Shingles [id number]    Shingles     Type II or unspecified type diabetes mellitus without mention of complication, not stated as uncontrolled      Past Surgical History:   Procedure Laterality Date    APPENDECTOMY      CARDIAC CATHETERIZATION  04/06/2004    CARDIAC CATHETERIZATION  10/01/2002    CHOLECYSTECTOMY      COLONOSCOPY  2003    rectal hemangiomas, and colon polyps    COLONOSCOPY  04/29/2010    CYSTOSCOPY  2010    LARYNGOSCOPY  2008    MALIGNANT SKIN LESION EXCISION  2001    OTHER SURGICAL HISTORY  2012    right arm, several sutures applied from injury    PTCA  2002 no stents, 2005 two stents, 2004 two stents    UPPER GASTROINTESTINAL ENDOSCOPY  2003     Social History     Socioeconomic History    Marital status:      Spouse name: Not on file    Number of children: Not on file    Years of education: Not on file    Highest education level: Not on file   Occupational History    Not on file   Tobacco Use    Smoking status: Never Smoker    Smokeless tobacco: Never Used   Substance and Sexual Activity    Alcohol use: No    Drug use: No    Sexual activity: Never   Other Topics Concern    Not on file   Social History Narrative    Not on file     Social Determinants of Health     Financial Resource Strain: Low Risk     Difficulty of Paying Living Expenses: Not hard at all   Food Insecurity: No Food Insecurity    Worried About [address] in the Last Year: Never true    [address] in the Last Year: Never true   Transportation Needs:     Lack of Transportation (Medical): Not on file    Lack of Transportation (Non-Medical):  Not on file   Physical Activity:     Days of Exercise per Week: Not on file    Minutes of Exercise per Session: Not on file   Stress:     Feeling of Stress : Not on file   Social Connections:     Frequency of Communication with Friends and Family: Not on file    Frequency of Social Gatherings with Friends and Family: Not on file    Attends Orthodox Services: Not on file   [employer]s or Organizations: Not on file    Attends Club or Organization Meetings: Not on file    Marital Status: Not on file   Intimate Partner Violence:     Fear of Current or Ex-Partner: Not on file    Emotionally Abused: Not on file    Physically Abused: Not on file    Sexually Abused: Not on file   Housing Stability:     Unable to Pay for Housing in the Last Year: Not on file    Number of [place] in the Last Year: Not on file    Unstable Housing in the Last Year: Not on file     Family History   Problem Relation Age of Onset    Ovarian Cancer Sister     Heart Attack Sister       Allergies   Allergen Reactions    Cimetidine Diarrhea    Codeine Hives     Listed as getting hives from codeine but has repeatedly tolerated Vicodan and percocet without rash or allergy 2/16/12 HES    Colesevelam Diarrhea    Glucophage [Metformin Hydrochloride] Diarrhea    Lisinopril Other (See Comments)     Makes pt cough    Metformin Diarrhea    Other      Other reaction(s): Intolerance  Naprolen    Oxycodone Swelling    Percocet [Oxycodone-Acetaminophen] Swelling     ankles    Statins     Sulfa Antibiotics     Welchol [Colesevelam Hcl] Diarrhea    Nifedipine Nausea And Vomiting    Pregabalin Other (See Comments) and Rash     Sleep deprivation   Sleep deprivation     Ranitidine Hcl Nausea And Vomiting    Sulfamethopyrazine Nausea And Vomiting     Current Outpatient Medications   Medication Sig Dispense Refill    ZINC OXIDE, TOPICAL, 25 % OINT Apply to buttock sores twice daily 28.4 g 0    lidocaine (XYLOCAINE) 5 % ointment Apply topically as needed.  50 g 1    DRUG [facility] UNIFINE PENTIPS 31G X 6 MM MISC use 3 TO 4 times daily AS DIRECTED 100 each 3    levothyroxine (SYNTHROID) 100 MCG tablet TAKE 1 TABLET EVERY DAY 30 tablet 11    simvastatin (ZOCOR) 40 MG tablet TAKE 1 TABLET BY MOUTH nightly 30 tablet 11    Continuous Blood Gluc Sensor (FREESTYLE SIXTO 14 DAY SENSOR) MISC Every 2 weeks 2 each 5    erythromycin (ROMYCIN) 5 MG/GM ophthalmic ointment 4 times daily      triamcinolone (KENALOG) 0.1 % cream apply TWICE DAILY to the affected areas until clear, may repeat AS NEEDED for flare, avoid face/ groin      HUMALOG KWIKPEN 100 UNIT/ML SOPN inject 4 units in the am & lunch if glucose more than 160 15 mL 3    LANTUS SOLOSTAR 100 UNIT/ML injection pen Inject 6 units into the skin nightly 15 mL 3    isosorbide mononitrate (IMDUR) 30 MG extended release tablet TAKE 1 TABLET BY MOUTH DAILY 30 tablet 11    Handicap Placard MISC by Does not apply route Good through 7/1/2026 1 each 0    diphenhydrAMINE HCl (BENADRYL ALLERGY PO) Take by mouth      potassium gluconate 550 mg tablet Take 1 tablet by mouth daily      furosemide (LASIX) 40 MG tablet       Continuous Blood Gluc  (FREESTYLE SIXTO 14 DAY READER) KATELYNN As directed 1 Device 0    FREESTYLE LITE strip test 3 times daily 100 strip 6    calcium carbonate (OSCAL) 500 MG TABS tablet Take 500 mg by mouth daily      metoprolol tartrate (LOPRESSOR) 25 MG tablet Take 25 mg by mouth 2 times daily      ranolazine (RANEXA) 500 MG extended release tablet Take 500 mg by mouth daily       Acetaminophen (TYLENOL ARTHRITIS EXT RELIEF PO) Take by mouth      clopidogrel (PLAVIX) 75 MG tablet TAKE 1 TABLET BY MOUTH DAILY      FreeStyle Lancets MISC USE AS DIRECTED to test blood sugar THREE TIMES DAILY 100 each 2    Compression Stockings MISC by Does not apply route B/l knee high compression stockings; 20-30mmhg 1 each 0    Handicap Placard MISC by Does not apply route Good through 9/30/2024 1 each 0    sodium chloride (LUIS FERNANDO 128) 5 % ophthalmic solution Use 1 Drop in both eyes four times daily.       lisinopril (PRINIVIL;ZESTRIL) 5 MG tablet bid  3    amLODIPine (NORVASC) 5 MG tablet Take 1 tablet by mouth 2 times daily 60 tablet 3    Fexofenadine HCl (ALLEGRA ALLERGY PO) Take by mouth       Potassium 99 MG TABS Take 1 tablet by mouth 2 times daily Return if symptoms worsen or fail to improve. Deedee Lundborg, MD On the basis of positive falls risk screening, assessment and plan is as follows: home safety tips provided.

## 2022-06-10 ENCOUNTER — TELEPHONE (OUTPATIENT)
Dept: FAMILY MEDICINE CLINIC | Age: 87
End: 2022-06-10

## 2022-06-10 PROBLEM — Z74.09 IMPAIRED MOBILITY AND ADLS: Status: ACTIVE | Noted: 2022-01-01

## 2022-06-10 PROBLEM — Z78.9 IMPAIRED MOBILITY AND ADLS: Status: ACTIVE | Noted: 2022-01-01

## 2022-06-10 PROBLEM — E11.65 TYPE 2 DIABETES MELLITUS WITH HYPERGLYCEMIA (HCC): Status: RESOLVED | Noted: 2021-10-06 | Resolved: 2022-01-01

## 2022-06-10 PROBLEM — E11.40 TYPE 2 DIABETES MELLITUS WITH DIABETIC NEUROPATHY (HCC): Status: RESOLVED | Noted: 2021-10-06 | Resolved: 2022-01-01

## 2022-06-10 NOTE — TELEPHONE ENCOUNTER
I placed an order for palliative and home care. Can we let the family know to call us if no one contacts them about this next week.

## 2022-06-16 ENCOUNTER — TELEPHONE (OUTPATIENT)
Dept: FAMILY MEDICINE CLINIC | Age: 87
End: 2022-06-16

## 2022-06-16 DIAGNOSIS — Z74.09 IMPAIRED MOBILITY AND ADLS: Primary | ICD-10-CM

## 2022-06-16 DIAGNOSIS — Z78.9 IMPAIRED MOBILITY AND ADLS: Primary | ICD-10-CM

## 2022-08-15 RX ORDER — ISOSORBIDE MONONITRATE 30 MG/1
TABLET, EXTENDED RELEASE ORAL
Qty: 30 TABLET | Refills: 11 | Status: SHIPPED | OUTPATIENT
Start: 2022-08-15

## 2022-08-15 NOTE — TELEPHONE ENCOUNTER
Comments:   Last Office Visit (last PCP visit):   6/9/2022    Next Visit Date:  No future appointments. **If hasn't been seen in over a year OR hasn't followed up according to last diabetes/ADHD visit, make appointment for patient before sending refill to provider.     Rx requested:  Requested Prescriptions     Pending Prescriptions Disp Refills    isosorbide mononitrate (IMDUR) 30 MG extended release tablet [Pharmacy Med Name: isosorbide mononitrate ER 30 mg tablet,extended release 24 hr] 30 tablet 11     Sig: TAKE 1 TABLET BY MOUTH DAILY

## 2022-10-15 DIAGNOSIS — E11.9 TYPE 2 DIABETES MELLITUS WITHOUT COMPLICATIONS (HCC): ICD-10-CM

## 2022-10-17 RX ORDER — FLASH GLUCOSE SENSOR
KIT MISCELLANEOUS
Qty: 2 EACH | Refills: 0 | Status: SHIPPED | OUTPATIENT
Start: 2022-10-17

## 2022-10-28 PROBLEM — Z51.5 HOSPICE CARE: Status: ACTIVE | Noted: 2022-01-01

## 2022-11-19 PROBLEM — I25.10 ATHEROSCLEROTIC HEART DISEASE OF NATIVE CORONARY ARTERY WITHOUT ANGINA PECTORIS: Status: ACTIVE | Noted: 2022-01-01
